# Patient Record
Sex: FEMALE | Race: WHITE | Employment: FULL TIME | URBAN - METROPOLITAN AREA
[De-identification: names, ages, dates, MRNs, and addresses within clinical notes are randomized per-mention and may not be internally consistent; named-entity substitution may affect disease eponyms.]

---

## 2017-02-08 ENCOUNTER — TRANSFERRED RECORDS (OUTPATIENT)
Dept: HEALTH INFORMATION MANAGEMENT | Facility: CLINIC | Age: 57
End: 2017-02-08

## 2017-04-04 ENCOUNTER — OFFICE VISIT (OUTPATIENT)
Dept: INTERNAL MEDICINE | Facility: CLINIC | Age: 57
End: 2017-04-04

## 2017-04-04 ENCOUNTER — TELEPHONE (OUTPATIENT)
Dept: GASTROENTEROLOGY | Facility: CLINIC | Age: 57
End: 2017-04-04

## 2017-04-04 VITALS
HEART RATE: 74 BPM | SYSTOLIC BLOOD PRESSURE: 101 MMHG | DIASTOLIC BLOOD PRESSURE: 68 MMHG | WEIGHT: 129.1 LBS | BODY MASS INDEX: 22.87 KG/M2

## 2017-04-04 DIAGNOSIS — E08.9 DIABETES MELLITUS DUE TO UNDERLYING CONDITION WITHOUT COMPLICATION, WITH LONG-TERM CURRENT USE OF INSULIN (H): ICD-10-CM

## 2017-04-04 DIAGNOSIS — Z80.0 FAMILY HISTORY OF COLON CANCER: ICD-10-CM

## 2017-04-04 DIAGNOSIS — Z79.4 DIABETES MELLITUS DUE TO UNDERLYING CONDITION WITHOUT COMPLICATION, WITH LONG-TERM CURRENT USE OF INSULIN (H): ICD-10-CM

## 2017-04-04 DIAGNOSIS — Z23 NEED FOR PROPHYLACTIC VACCINATION WITH TETANUS-DIPHTHERIA (TD): Primary | ICD-10-CM

## 2017-04-04 LAB
CREAT UR-MCNC: 86 MG/DL
HBA1C MFR BLD: 6.4 % (ref 4.3–6)
MICROALBUMIN UR-MCNC: 13 MG/L
MICROALBUMIN/CREAT UR: 15.15 MG/G CR (ref 0–25)

## 2017-04-04 ASSESSMENT — PAIN SCALES - GENERAL: PAINLEVEL: NO PAIN (0)

## 2017-04-04 NOTE — TELEPHONE ENCOUNTER
Message left for patient to call back to schedule colonoscopy as recommended by her PCP.    Arina Spears RN

## 2017-04-04 NOTE — NURSING NOTE
Chief Complaint   Patient presents with     Diabetes     pt here for diabetes check     Mary Anne Rosario CMA at 7:38 AM on 4/4/2017.

## 2017-04-04 NOTE — MR AVS SNAPSHOT
After Visit Summary   4/4/2017    Krish Renee    MRN: [id number]           Patient Information     Date Of Birth          1960        Visit Information        Provider Department      4/4/2017 7:40 AM Ariane Varela MD [facility]        Today's Diagnoses     Need for prophylactic vaccination with tetanus-diphtheria (TD)    -  1    Diabetes mellitus due to underlying condition without complication, with long-term current use of insulin (H)        Family history of colon cancer          Care Instructions    Primary Care Center Medication Refill Request Information:  * Please contact your pharmacy regarding ANY request for medication refills.  ** [facility] Prescription Fax = [phone]  * Please allow 3 business days for routine medication refills.  * Please allow 5 business days for controlled substance medication refills.     Primary Care Center Test Result notification information:  *You will be notified with in 7-10 days of your appointment day regarding the results of your test.  If you are on MyChart you will be notified as soon as the provider has reviewed the results and signed off on them.      Primary Care Center   [facility]  [address] [place] [address] (4th Floor )   [place], [place] [address] [phone]  [phone]  [facility] ([facility])  [address] [place] [address], [address]  [place] [address]  [phone].          Follow-ups after your visit        Additional Services     GI Procedure Referral       Last Lab Result: Creatinine (mg/dL)       Date                     Value                 09/13/2016               0.62             ----------  Body mass index is 22.87 kg/(m^2).     Needed:  No  Language:  English    Patient will be contacted to schedule procedure.     Please be aware that coverage of these services is subject to the terms and limitations of your health insurance plan.  Call member services at your health plan with any benefit or  coverage questions.  Any procedures must be performed at a [place] facility OR coordinated by your clinic's referral office.    Please bring the following with you to your appointment:    (1) Any X-Rays, CTs or MRIs which have been performed.  Contact the facility where they were done to arrange for  prior to your scheduled appointment.    (2) List of current medications   (3) This referral request   (4) Any documents/labs given to you for this referral                  Your next 10 appointments already scheduled     May 11, 2017  7:45 AM CDT   (Arrive by 7:30 AM)   MA SCREENING DIGITAL BILATERAL with [id number]   [facility] ([facility])    [address]  2nd Floor  [facility] [address] [phone]           Do not use any powder, lotion or deodorant under your arms or on your breast. If you do, we will ask you to remove it before your exam.  Wear comfortable, two-piece clothing.  If you have any allergies, tell your care team.  Bring any previous mammograms from other facilities or have them mailed to the breast center. This mammogram location, [facility], now offers 3D mammography. It doesn't replace a screening mammogram and can be done with a regular screening mammogram. It is optional and not all insurances will pay for it. 3D mammography is a special kind of mammogram that produces a three-dimensional image of the breast by using low dose-xrays. 3D allows the radiologist to see the breast tissue differently from 2D, which reduces the chance of repeat testing due to overlapping breast tissue. If you are interested in have a 3D mammogram, please check with your insurance before you arrive for your exam. 3D mammography is offered to all patients. On the day of your exam you will be asked to sign a form stating yes, you wish to have 3D imaging or, no, you decline.              Future tests that were ordered for you today      Open Future Orders        Priority Expected Expires Ordered    HEMOGLOBIN A1C -(Today) Routine 4/4/2017 4/4/2018 4/4/2017    Albumin Random Urine Quantitative - (Today) Routine 4/4/2017 4/4/2018 4/4/2017            Who to contact     Please call your clinic at [phone] to:    Ask questions about your health    Make or cancel appointments    Discuss your medicines    Learn about your test results    Speak to your doctor   If you have compliments or concerns about an experience at your clinic, or if you wish to file a complaint, please contact [facility] Patient Relations at [phone] or email us at [email or web address]         Additional Information About Your Visit        [employer] Information     [employer] gives you secure access to your electronic health record. If you see a primary care provider, you can also send messages to your care team and make appointments. If you have questions, please call your primary care clinic.  If you do not have a primary care provider, please call [phone] and they will assist you.      [employer] is an electronic gateway that provides easy, online access to your medical records. With [employer], you can request a clinic appointment, read your test results, renew a prescription or communicate with your care team.     To access your existing account, please contact your [facility] or call [phone] for assistance.        Care EveryWhere ID     This is your Care EveryWhere ID. This could be used by other organizations to access your [facility] medical records  [id number]        Your Vitals Were     Pulse Last Period BMI (Body Mass Index)             74 (Approximate) 22.87 kg/m2          Blood Pressure from Last 3 Encounters:   04/04/17 101/68   10/27/16 100/65   10/03/16 104/69    Weight from Last 3 Encounters:   04/04/17 58.6 kg (129 lb 1.6 oz)   10/27/16 58.1 kg (128 lb)   10/03/16 59 kg (130 lb)               We Performed the Following     GI Procedure Referral     TDAP ( [employer] AGES 10-64)        Primary Care Provider Office Phone # Fax #    Ariane Abdulaziz Varela [phone]        [facility] [address] [place] [address] [phone]  [facility] [address]        Thank you!     Thank you for choosing [facility]  for your care. Our goal is always to provide you with excellent care. Hearing back from our patients is one way we can continue to improve our services. Please take a few minutes to complete the written survey that you may receive in the mail after your visit with us. Thank you!             Your Updated Medication List - Protect others around you: Learn how to safely use, store and throw away your medicines at www.disposemymeds.org.          This list is accurate as of: 4/4/17  9:12 AM.  Always use your most recent med list.                   Brand Name Dispense Instructions for use    aspirin 81 MG EC tablet     90 tablet    Take 1 tablet (81 mg) by mouth daily       atorvastatin 40 MG tablet    LIPITOR    90 tablet    Take 1 tablet (40 mg) by mouth daily       blood glucose monitoring lancets     1 Box    Use to test blood sugars 4 times daily or as directed.       blood glucose monitoring test strip    FREESTYLE LITE    300 each    Use to test blood sugars 4 times daily or as directed.       FLUoxetine 20 MG capsule    PROzac    90 capsule    Take 60 mg by mouth daily       insulin glargine 100 UNIT/ML injection    LANTUS SOLOSTAR    9 mL    15 units at QAM or as directed       insulin pen needle 31G X 6 MM     100 each    Use 1 X daily or as directed.       metFORMIN 500 MG 24 hr tablet    GLUCOPHAGE-XR    90 tablet    500mg once a day for 7 days, the 1000mg once a day for 7 days, then 2000mg once a day

## 2017-04-04 NOTE — PROGRESS NOTES
SUBJECTIVE:    Ms. Renee is a 56 year old female with pmh of pancreatic neoplasm s/p distal pancreatectomy, HLD, and DM II. She presents today for DM follow up and medication check. Her blood sugars have been stable at home with fasting glucoses in the low-mid 80's. She did do a trial off her lantus last year which caused her fasting BS to be in the 110s. She has rare episodes of hypoglycemia when she is taking long walks (around the 2 hour maeve of the walk).  She denies any numbness or tingling in her hands or feet. She has an eye exam scheduled today. She would like to explore backing off of the metformin as she continues to have some GI symptoms - diarrhea about 1x/week or every other week. She states it is tolerable though. She also dropped her lantus from 15 units to 10 units and feels she has better control of her fasting blood sugars.     She states her depression is under control and has been having no issues with that.     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 Maternal Grandfather      DIABETES Father      Hypertension Father      CEREBROVASCULAR DISEASE Father      DIABETES Paternal Grandmother      Cancer - colorectal Maternal Grandmother      Allergies Sister      Depression Mother      Depression Father      Depression Sister      Depression Brother      Depression Brother      Depression Son      Alcohol/Drug Father       Alcohol/Drug Brother      CEREBROVASCULAR DISEASE Paternal Grandmother      Lipids Father      Circulatory Mother      blood clots     Arthritis Mother      CANCER Maternal Aunt      uterine     CANCER Brother      non hodgkins lymphoma, and hemolitic anemia     Social History   Substance Use Topics     Smoking status: Never Smoker     Smokeless tobacco: Never Used     Alcohol use No       Review Of Systems  Skin: negative  Eyes: negative  Ears/Nose/Throat: negative  Respiratory: No shortness of breath, dyspnea on exertion, cough, or hemoptysis  Cardiovascular: negative  Gastrointestinal: positive for diarrhea  Genitourinary: negative  Musculoskeletal: negative  Neurologic: negative  Psychiatric: positive for depression stable  Hematologic/Lymphatic/Immunologic: negative  Endocrine: positive for diabetes    OBJECTIVE:  /68 (BP Location: Right arm, Patient Position: Chair, Cuff Size: Adult Regular)  Pulse 74  Wt 58.6 kg (129 lb 1.6 oz)  LMP  (Approximate)  BMI 22.87 kg/m2  GENERAL APPEARANCE: Alert, no acute distress  HENT: EOMI, face symmetrical, no scleral icterus  RESP: lungs clear to auscultation   CV: normal rate, regular rhythm, no murmur or gallop  ABDOMEN: normoactive bowel sounds, non-tender, non-distended  MS: extremities normal, no peripheral edema  NEURO: Alert, oriented, speech and mentation normal  PSYCHE: mentation appears normal, affect and mood normal    ASSESSMENT/PLAN:    1- Diabetes: Very well controlled. Discussed why we use metformin with insulin and decreasing metformin could cause an increased need of insulin and potential weight gain with that. Her GI symptoms don't bother her enough to aggressively pursue other options, but discussed her ability to decrease Metformin to 1500mg/day if she feels like the GI symptoms are bothersome enough. Diarrhea could also be due to pancreatic insuffiencey - discussed pancreatic enzyme stool test, which patient is declining at this time.    - A1C    - Albumin Urine Quantitative     2- Health Maintenance: Due for TDAP and colonoscopy. Up to date on other immunizations/pap/mammo   - Colonoscopy referral to GI   - TDAP today      Scribe Disclosure:   I,  Elizabeth Chun, am serving as a scribe; to document services personally performed by Ariane Varela MD- -based on data collection and the provider's statements to me.     Provider Disclosure:  I agree with above History, Review of Systems, Physical exam and Plan.  I have reviewed the content of the documentation and have edited it as needed. I have personally performed the services documented here and the documentation accurately represents those services and the decisions I have made.      Electronically signed by:  Ariane Varela MD

## 2017-04-04 NOTE — PATIENT INSTRUCTIONS
Primary Care Center Medication Refill Request Information:  * Please contact your pharmacy regarding ANY request for medication refills.  ** [facility] Prescription Fax = [phone]  * Please allow 3 business days for routine medication refills.  * Please allow 5 business days for controlled substance medication refills.     Primary Care Center Test Result notification information:  *You will be notified with in 7-10 days of your appointment day regarding the results of your test.  If you are on MyChart you will be notified as soon as the provider has reviewed the results and signed off on them.      Primary Care Center   [facility]  [address] (4th Floor )   [place], [place] [address] [phone]  [phone]  [facility] ([facility])  [address] [place] [address], [address]  [place] [address] [phone].

## 2017-04-10 DIAGNOSIS — E11.9 DIABETES MELLITUS, TYPE 2 (H): ICD-10-CM

## 2017-04-10 RX ORDER — LANCETS 28 GAUGE
EACH MISCELLANEOUS
Qty: 4 BOX | Refills: 3 | Status: SHIPPED | OUTPATIENT
Start: 2017-04-10 | End: 2019-05-30

## 2017-05-11 ENCOUNTER — RADIANT APPOINTMENT (OUTPATIENT)
Dept: MAMMOGRAPHY | Facility: CLINIC | Age: 57
End: 2017-05-11

## 2017-05-11 DIAGNOSIS — Z12.31 VISIT FOR SCREENING MAMMOGRAM: ICD-10-CM

## 2017-06-22 DIAGNOSIS — E11.9 DIABETES MELLITUS (H): ICD-10-CM

## 2017-08-28 ENCOUNTER — MYC MEDICAL ADVICE (OUTPATIENT)
Dept: INTERNAL MEDICINE | Facility: CLINIC | Age: 57
End: 2017-08-28

## 2017-08-29 NOTE — TELEPHONE ENCOUNTER
Krish requesting her form for work be filled out and faxed. Nurse found form in Dr. Varela's box, signed. Nurse faxed to requested number. Placed in RN outbasket for scanning.

## 2017-09-07 ENCOUNTER — TELEPHONE (OUTPATIENT)
Dept: GASTROENTEROLOGY | Facility: OUTPATIENT CENTER | Age: 57
End: 2017-09-07

## 2017-09-07 DIAGNOSIS — Z80.0 FAMILY HX OF COLON CANCER REQUIRING SCREENING COLONOSCOPY: Primary | ICD-10-CM

## 2017-09-07 DIAGNOSIS — E11.9 DIABETES MELLITUS, TYPE 2 (H): Primary | ICD-10-CM

## 2017-09-07 RX ORDER — METFORMIN HCL 500 MG
TABLET, EXTENDED RELEASE 24 HR ORAL
Qty: 360 TABLET | Refills: 1 | Status: SHIPPED | OUTPATIENT
Start: 2017-09-07 | End: 2018-04-17

## 2017-09-07 NOTE — TELEPHONE ENCOUNTER
metformin  Last Written Prescription Date:  7/21/16  Last Fill Quantity: 90,   # refills: 3  Last Office Visit : 4/4/17  Future Office visit:  No future appt   A1C 6.4 (H) 04/04/2017     Lab Results   Component Value Date    MICROL 13 04/04/2017       Routing refill request to provider for review/approval because:  Directions updated to current dose

## 2017-09-07 NOTE — TELEPHONE ENCOUNTER
Patient taking any blood thinners ? Advised patient to stop aspirin 2 days prior to exam    Heart disease ? denies    Lung disease ? denies      Sleep apnea ? denies    Diabetic ? Type 2 Advised patient to consult her provider about insulin management    Kidney disease ? denies    Dialysis ? n/a    Electronic implanted medical devices ? denies    Are you taking any narcotic pain medication ? no  What is your daily dosage ?    PTSD ? n/a    Prep instructions reviewed with patient ? Patient declined review.  policy, conscious sedation plan reviewed. Advised patient to have someone stay with her post exam    Pharmacy : [facility]    Indication for procedure :  Associated Diagnoses      Family history of colon cancer [Z80.0            Referring provider :  Providers      Authorizing Provider Encounter Provider     Ariane Varela MD Melnik, Tanya Eugena, MD

## 2017-09-14 ENCOUNTER — TRANSFERRED RECORDS (OUTPATIENT)
Dept: HEALTH INFORMATION MANAGEMENT | Facility: CLINIC | Age: 57
End: 2017-09-14

## 2017-09-14 ENCOUNTER — DOCUMENTATION ONLY (OUTPATIENT)
Dept: GASTROENTEROLOGY | Facility: OUTPATIENT CENTER | Age: 57
End: 2017-09-14

## 2017-09-14 DIAGNOSIS — Z80.0 FAMILY HISTORY OF COLON CANCER: Primary | ICD-10-CM

## 2017-09-25 DIAGNOSIS — E11.9 TYPE 2 DIABETES MELLITUS WITHOUT COMPLICATION, UNSPECIFIED LONG TERM INSULIN USE STATUS: ICD-10-CM

## 2017-09-25 LAB — HBA1C MFR BLD: 6.1 % (ref 4.3–6)

## 2017-09-25 PROCEDURE — 83036 HEMOGLOBIN GLYCOSYLATED A1C: CPT | Performed by: INTERNAL MEDICINE

## 2017-09-25 PROCEDURE — 36415 COLL VENOUS BLD VENIPUNCTURE: CPT | Performed by: INTERNAL MEDICINE

## 2017-11-01 DIAGNOSIS — E11.9 DIABETES MELLITUS (H): ICD-10-CM

## 2017-11-01 RX ORDER — FLURBIPROFEN SODIUM 0.3 MG/ML
SOLUTION/ DROPS OPHTHALMIC
Refills: 1 | OUTPATIENT
Start: 2017-11-01

## 2017-12-05 ENCOUNTER — MYC MEDICAL ADVICE (OUTPATIENT)
Dept: INTERNAL MEDICINE | Facility: CLINIC | Age: 57
End: 2017-12-05

## 2017-12-05 DIAGNOSIS — Z80.0 FAMILY HISTORY OF COLON CANCER: Primary | ICD-10-CM

## 2017-12-13 DIAGNOSIS — E08.9 DIABETES MELLITUS DUE TO UNDERLYING CONDITION WITHOUT COMPLICATIONS (H): ICD-10-CM

## 2017-12-13 DIAGNOSIS — E11.9 TYPE 2 DIABETES MELLITUS WITH INSULIN DEFICIENCY (H): Primary | ICD-10-CM

## 2017-12-13 RX ORDER — INSULIN GLARGINE 100 [IU]/ML
INJECTION, SOLUTION SUBCUTANEOUS
Qty: 30 ML | Refills: 1 | Status: SHIPPED | OUTPATIENT
Start: 2017-12-13 | End: 2017-12-27

## 2017-12-13 NOTE — TELEPHONE ENCOUNTER
Last seen in clinic October 2016 with you and Dr Croft. Left msg on vm to please call to schedule an office visit

## 2017-12-27 ENCOUNTER — OFFICE VISIT (OUTPATIENT)
Dept: ENDOCRINOLOGY | Facility: CLINIC | Age: 57
End: 2017-12-27
Payer: COMMERCIAL

## 2017-12-27 VITALS
DIASTOLIC BLOOD PRESSURE: 74 MMHG | WEIGHT: 126.6 LBS | SYSTOLIC BLOOD PRESSURE: 120 MMHG | HEART RATE: 83 BPM | HEIGHT: 63 IN | BODY MASS INDEX: 22.43 KG/M2

## 2017-12-27 DIAGNOSIS — Z79.4 TYPE 2 DIABETES MELLITUS WITHOUT COMPLICATION, WITH LONG-TERM CURRENT USE OF INSULIN (H): Primary | ICD-10-CM

## 2017-12-27 DIAGNOSIS — E11.9 TYPE 2 DIABETES MELLITUS WITHOUT COMPLICATION, WITH LONG-TERM CURRENT USE OF INSULIN (H): Primary | ICD-10-CM

## 2017-12-27 DIAGNOSIS — E11.9 TYPE 2 DIABETES MELLITUS WITH INSULIN DEFICIENCY (H): ICD-10-CM

## 2017-12-27 LAB — HBA1C MFR BLD: 5.8 % (ref 4.3–6)

## 2017-12-27 ASSESSMENT — PAIN SCALES - GENERAL: PAINLEVEL: NO PAIN (0)

## 2017-12-27 ASSESSMENT — ENCOUNTER SYMPTOMS
DECREASED APPETITE: 0
WEIGHT GAIN: 0
CHILLS: 0
NIGHT SWEATS: 0
ALTERED TEMPERATURE REGULATION: 0
POLYDIPSIA: 0
HALLUCINATIONS: 0
FEVER: 0
FATIGUE: 1
WEIGHT LOSS: 0
POLYPHAGIA: 0
INCREASED ENERGY: 1

## 2017-12-27 NOTE — PROGRESS NOTES
Endocrinology Clinic Visit 12/27/2017    NAME:  Krish Renee  PCP:  Ariane Varela Abdulaziz  MRN:  [id number]  Reason for Consult:  Type 2 Diabetes  Requesting Provider:  Referred Self    Chief Complaint     Chief Complaint   Patient presents with     RECHECK     return diabetes        History of Present Illness     Krish Renee is a 57 year old female who is seen in clinic for diabetes management.     She is a previous patient of Dr. Angelito Croft, who has since graduated Endocrinology fellowship and left the [facility]. She is here to re-establish endocrine care.   She was diagnosed with diabetes in 2016 with a hemoglobin A1c of 10.1%.  She has a remote history of pancreatic surgery and initially it was thought her diabetes was related to pancreatic insufficiency, however when she first saw endocrinology C-peptide was checked which was not low, thus consistent with type 2 diabetes for which she has a strong family history.  She was started on metformin which she did not tolerate so she was switched to Metformin XR which has been well-tolerated.  She was also in a small dose of Lantus.  Last visit with endocrinology was October 2016.    Since last visit, she has reduced her Lantus to 10 units.   She tried without Lantus twice in the past year for few days at a time, and did not like how high her BG went (>100 fasting).   With Lantus, she is not experiencing any AM hypoglycemia. Sometimes in the day is she goes on long hikes her BG will drift down. But that is very rare.     A1c today was 5.8.     Associated Signs/Symptoms  Hyperglycemia: none.Neuropathy: none. Vascular Symtpoms: none. Angina/CHF: no chest pain. Ulcers: No. Amputations: No    Current treatment strategy: Lantus 10 units s/c QAM, Metformin XR 1000 mg po BID    Blood Glucose Monitoring: Meter reviewed as such:  Checks at variable times of the day, but mostly mornings. Usually  in AM rare in the 70s. Later in day more variable.   Hypoglycemia:  none    Diet:   Very regimented. Breakfast/lunch/dinner. No snacks. 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     Hyperlipidemia LDL goal <160     Ectopic breast tissue     Abdominal pain, right lower quadrant     Periocular dermatitis     Diabetes mellitus, type 2 (H)        Medications     Current Outpatient Prescriptions   Medication     insulin glargine (LANTUS SOLOSTAR) 100 UNIT/ML injection     insulin pen needle 31G X 6 MM     metFORMIN (GLUCOPHAGE-XR) 500 MG 24 hr tablet     blood glucose monitoring (FREESTYLE LITE) test strip     blood glucose monitoring (FREESTYLE) lancets     aspirin 81 MG EC tablet     atorvastatin (LIPITOR) 40 MG tablet     FLUoxetine (PROZAC) 20 MG capsule     [DISCONTINUED] LANTUS SOLOSTAR 100 UNIT/ML soln     No current facility-administered medications for this visit.         Allergies     Allergies   Allergen Reactions     No Known Drug Allergy        Medical / Surgical History     Past Medical History:   Diagnosis Date     depression      Leiomyoma of uterus, unspecified      NONSPECIFIC MEDICAL HISTORY Aug 2006    neurologic evaluation for CVA-like symptoms, no etiology determined     Past Surgical History:   Procedure Laterality Date     C NONSPECIFIC PROCEDURE  1994    C/S     C NONSPECIFIC PROCEDURE  Nov 2006    splenectomy/distal pancreatectomy at [facility] for benign tumor     C NONSPECIFIC PROCEDURE  1982    wisdom teeth removed     PANCREATECTOMY PARTIAL  2006       Social History     Social History     Social History     Marital status:      Spouse name: Eldon Renee     Number of children: 2     Years of education: 18     Occupational  History     [employer]     Social History Main Topics     Smoking status: Never Smoker     Smokeless tobacco: Never Used     Alcohol use No     Drug use: No     Sexual activity: Yes     Partners: Male     Birth control/ protection: Surgical      Comment: vastectomy     Other Topics Concern     Not on file     Social History Narrative       Family History     Family History   Problem Relation Age of Onset     C.A.D. Maternal Grandfather      DIABETES Father      Hypertension Father      CEREBROVASCULAR DISEASE Father      DIABETES Paternal Grandmother      Cancer - colorectal Maternal Grandmother      Allergies Sister      Depression Mother      Depression Father      Depression Sister      Depression Brother      Depression Brother      Depression Son      Alcohol/Drug Father      Alcohol/Drug Brother      CEREBROVASCULAR DISEASE Paternal Grandmother      Lipids Father      Circulatory Mother      blood clots     Arthritis Mother      CANCER Maternal Aunt      uterine     CANCER Brother      non hodgkins lymphoma, and hemolitic anemia       ROS     Constitutional: no fevers, chills, night sweats. No weight loss or fatigue. Good appetite  Eyes: no vision changes, no eye redness, no diplopia  Ears, Nose, mouth, throat: no hearing changes, no tinnitus, no rhinorrhea, no nasal congestion  Cardiovascular: no chest pain, no orthopnea or PND, no edema, no palpitations  Respiratory: no dyspnea, no cough, no sputum, no wheezing  Gastrointestinal: no nausea, no vomiting, no abdominal pain, no diarrhea, no constipation  Genitourinary: no dysuria, no frequency, no urgency, no nocturia  Musculoskeletal: no joint pains, no back pain, no cramps, no fractures  Skin: no rash, no itching, no dryness, no ulcers, no hair loss  Neurological: no headache, no weakness, no numbness, no dizziness, no tremors  Psychiatric: no anxiety, no sadness  Hematologic/lymphatic: no easy bruising, no bleeding, no palor    Physical Exam   BP  "120/74  Pulse 83  Ht 1.6 m (5' 3\")  Wt 57.4 kg (126 lb 9.6 oz)  LMP  (Approximate)  BMI 22.43 kg/m2     General: Comfortable, no obvious distress, normal body habitus  Eyes: Sclera anicteric, moist conjunctiva  HENT: Atraumatic, oropharynx clear, moist mucous membranes with no mucosal ulcerations  Neck: Trachea midline, supple. Thyroid: Thyroid is normal in size and texture  CV: Regular rhythm, normal rate. No murmurs auscultated  Resp: Clear to auscultation bilaterally, good effort  Abdomen:  Soft, non tender, non distended. Bowel sounds heard. No organomegaly.  Skin: No rashes, lesions, or subcutaneous nodules.   Psych: Alert and oriented x 3. Appropriate affect, good insight  Extremities: No peripheral edema  Foot exam:  Pulses:  Dorsalis pedis: present bilaterally  Posterior tibial: present bilaterally  Foot exam:  Vibration/Light touch: sensation present bilaterally  Skin of foot: intact bilaterally  Musculoskeletal: Appropriate muscle bulk and strength  Lymphatic: No cervical lymphadenopathy  Neuro: Moves all four extremities. No focal deficits on limited exam. Gait normal.       Labs/Imaging and Outside Records     Pertinent Labs were reviewed and updated in [email or web address].  Radiology Results were  reviewed and updated in EPIC.    Summary of recent findings:   Lab Results   Component Value Date    A1C 6.1 09/25/2017    A1C 6.4 04/04/2017    A1C 10.2 02/16/2016       TSH   Date Value Ref Range Status   09/13/2016 1.56 0.40 - 4.00 mU/L Final   06/23/2010 1.17 0.4 - 5.0 mU/L Final   02/19/2007 1.37 0.4 - 5.0 mU/L Final   05/05/2006 0.97 0.4 - 5.0 mU/L Final       Creatinine   Date Value Ref Range Status   09/13/2016 0.62 0.52 - 1.04 mg/dL Final       Recent Labs   Lab Test  09/13/16   0749  02/16/16   0946  04/11/12   0737   CHOL  101  263*  240*   HDL  43*  38*  42*   LDL  44  191*  165*   TRIG  68  169*  166*   CHOLHDLRATIO   --    --   5.7*     Impression / Plan     1. Diabetes Mellitus: Type 2  Current glycemic " control can be considered excellent.  It has improved since last visit.     I discussed dietary concepts today with the patient, including: general nutrition guidelines and consistent meals.     I also discussed exercise recommendations with the patient, advising for a goal of moderate physical activity 30 minutes 5 days a week. Specific examples were discussed such as brisk walking.    She is following the above recommendations very well.     As for glycemic control it is very good. But she is resisant to stopping Lantus. It might be that with her history of pancreatectomy, that she needs a little bit of insulin. We can try lower dose of metformin, since the only pill she takes in evening is metformin, and see what happens.     Plan:  - Reduce Metformin XR to 1000 mg po Qam. Stop PM dose  - Keep Lantus the same  - A1c in 3 months.     2. Diabetes Complications: With none.   - check urine microalbumin in 3 months    3. Blood Pressure Management: Blood pressure is controlled. Currently is not on pharmacotherapy for this.     4.Lipid Management: Per the new ACC/ROD/NHLBI guidelines, statins are recommended for individuals with diabetes aged 40-75 with LDL  without ASCVD, and for any individual with ASCVD. Currently the patient is on a statin.     5. Smoking Status: Patient Pt is smoke free..     Test and/or medications prescribed today:  Orders Placed This Encounter   Procedures     Hemoglobin A1c     Albumin Random Urine Quantitative with Creat Ratio       Follow up: 1 year. Lab in 3 months.       Anu Valdez MD  Endocrinology, Diabetes and Metabolism  [facility]

## 2017-12-27 NOTE — LETTER
12/27/2017       RE: Krish Renee  [address]  [place] [id number]     Dear Colleague,    Thank you for referring your patient, Krish Renee, to the [facility] ENDOCRINOLOGY at [facility]. Please see a copy of my visit note below.    Endocrinology Clinic Visit 12/27/2017    NAME:  Krish Renee  PCP:  Ariane Varela  MRN:  [id number]  Reason for Consult:  Type 2 Diabetes  Requesting Provider:  Referred Self    Chief Complaint     Chief Complaint   Patient presents with     RECHECK     return diabetes      History of Present Illness   Krish Renee is a 57 year old female who is seen in clinic for diabetes management.     She is a previous patient of Dr. Angelito Croft, who has since graduated Endocrinology fellowship and left the [facility]. She is here to re-establish endocrine care.   She was diagnosed with diabetes in 2016 with a hemoglobin A1c of 10.1%.  She has a remote history of pancreatic surgery and initially it was thought her diabetes was related to pancreatic insufficiency, however when she first saw endocrinology C-peptide was checked which was not low, thus consistent with type 2 diabetes for which she has a strong family history.  She was started on metformin which she did not tolerate so she was switched to Metformin XR which has been well-tolerated.  She was also in a small dose of Lantus.  Last visit with endocrinology was October 2016.    Since last visit, she has reduced her Lantus to 10 units.   She tried without Lantus twice in the past year for few days at a time, and did not like how high her BG went (>100 fasting).   With Lantus, she is not experiencing any AM hypoglycemia. Sometimes in the day is she goes on long hikes her BG will drift down. But that is very rare.     A1c today was 5.8.     Associated Signs/Symptoms  Hyperglycemia: none.Neuropathy: none. Vascular Symtpoms: none. Angina/CHF: no chest pain. Ulcers: No. Amputations:  No    Current treatment strategy: Lantus 10 units s/c QAM, Metformin XR 1000 mg po BID    Blood Glucose Monitoring: Meter reviewed as such:  Checks at variable times of the day, but mostly mornings. Usually  in AM rare in the 70s. Later in day more variable.   Hypoglycemia: none    Diet:   Very regimented. Breakfast/lunch/dinner. No snacks. 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     Hyperlipidemia LDL goal <160     Ectopic breast tissue     Abdominal pain, right lower quadrant     Periocular dermatitis     Diabetes mellitus, type 2 (H)      Medications     Current Outpatient Prescriptions   Medication     insulin glargine (LANTUS SOLOSTAR) 100 UNIT/ML injection     insulin pen needle 31G X 6 MM     metFORMIN (GLUCOPHAGE-XR) 500 MG 24 hr tablet     blood glucose monitoring (FREESTYLE LITE) test strip     blood glucose monitoring (FREESTYLE) lancets     aspirin 81 MG EC tablet     atorvastatin (LIPITOR) 40 MG tablet     FLUoxetine (PROZAC) 20 MG capsule     [DISCONTINUED] LANTUS SOLOSTAR 100 UNIT/ML soln     No current facility-administered medications for this visit.         Allergies     Allergies   Allergen Reactions     No Known Drug Allergy        Medical / Surgical History     Past Medical History:   Diagnosis Date     depression      Leiomyoma of uterus, unspecified      NONSPECIFIC MEDICAL HISTORY Aug 2006    neurologic evaluation for CVA-like symptoms, no etiology determined     Past Surgical History:   Procedure Laterality Date     C NONSPECIFIC PROCEDURE  1994    C/S     C NONSPECIFIC PROCEDURE  Nov 2006    splenectomy/distal pancreatectomy at [facility] for benign tumor      C NONSPECIFIC PROCEDURE  1982    wisdom teeth removed     PANCREATECTOMY PARTIAL  2006     Social History     Social History     Social History     Marital status:      Spouse name: Eldon Renee     Number of children: 2     Years of education: 18     Occupational History     [employer]     Social History Main Topics     Smoking status: Never Smoker     Smokeless tobacco: Never Used     Alcohol use No     Drug use: No     Sexual activity: Yes     Partners: Male     Birth control/ protection: Surgical      Comment: vastectomy     Other Topics Concern     Not on file     Social History Narrative     Family History     Family History   Problem Relation Age of Onset     C.A.D. Maternal Grandfather      DIABETES Father      Hypertension Father      CEREBROVASCULAR DISEASE Father      DIABETES Paternal Grandmother      Cancer - colorectal Maternal Grandmother      Allergies Sister      Depression Mother      Depression Father      Depression Sister      Depression Brother      Depression Brother      Depression Son      Alcohol/Drug Father      Alcohol/Drug Brother      CEREBROVASCULAR DISEASE Paternal Grandmother      Lipids Father      Circulatory Mother      blood clots     Arthritis Mother      CANCER Maternal Aunt      uterine     CANCER Brother      non hodgkins lymphoma, and hemolitic anemia       ROS   Constitutional: no fevers, chills, night sweats. No weight loss or fatigue. Good appetite  Eyes: no vision changes, no eye redness, no diplopia  Ears, Nose, mouth, throat: no hearing changes, no tinnitus, no rhinorrhea, no nasal congestion  Cardiovascular: no chest pain, no orthopnea or PND, no edema, no palpitations  Respiratory: no dyspnea, no cough, no sputum, no wheezing  Gastrointestinal: no nausea, no vomiting, no abdominal pain, no diarrhea, no constipation  Genitourinary: no dysuria, no frequency, no urgency, no nocturia  Musculoskeletal: no joint pains, no back pain, no cramps, no  "fractures  Skin: no rash, no itching, no dryness, no ulcers, no hair loss  Neurological: no headache, no weakness, no numbness, no dizziness, no tremors  Psychiatric: no anxiety, no sadness  Hematologic/lymphatic: no easy bruising, no bleeding, no palor    Physical Exam   /74  Pulse 83  Ht 1.6 m (5' 3\")  Wt 57.4 kg (126 lb 9.6 oz)  LMP  (Approximate)  BMI 22.43 kg/m2     General: Comfortable, no obvious distress, normal body habitus  Eyes: Sclera anicteric, moist conjunctiva  HENT: Atraumatic, oropharynx clear, moist mucous membranes with no mucosal ulcerations  Neck: Trachea midline, supple. Thyroid: Thyroid is normal in size and texture  CV: Regular rhythm, normal rate. No murmurs auscultated  Resp: Clear to auscultation bilaterally, good effort  Abdomen:  Soft, non tender, non distended. Bowel sounds heard. No organomegaly.  Skin: No rashes, lesions, or subcutaneous nodules.   Psych: Alert and oriented x 3. Appropriate affect, good insight  Extremities: No peripheral edema  Foot exam:  Pulses:  Dorsalis pedis: present bilaterally  Posterior tibial: present bilaterally  Foot exam:  Vibration/Light touch: sensation present bilaterally  Skin of foot: intact bilaterally  Musculoskeletal: Appropriate muscle bulk and strength  Lymphatic: No cervical lymphadenopathy  Neuro: Moves all four extremities. No focal deficits on limited exam. Gait normal.     Labs/Imaging and Outside Records   Pertinent Labs were reviewed and updated in [facility].  Radiology Results were  reviewed and updated in EPIC.    Summary of recent findings:   Lab Results   Component Value Date    A1C 6.1 09/25/2017    A1C 6.4 04/04/2017    A1C 10.2 02/16/2016       TSH   Date Value Ref Range Status   09/13/2016 1.56 0.40 - 4.00 mU/L Final   06/23/2010 1.17 0.4 - 5.0 mU/L Final   02/19/2007 1.37 0.4 - 5.0 mU/L Final   05/05/2006 0.97 0.4 - 5.0 mU/L Final       Creatinine   Date Value Ref Range Status   09/13/2016 0.62 0.52 - 1.04 mg/dL Final "       Recent Labs   Lab Test  09/13/16   0749  02/16/16   0946  04/11/12   0737   CHOL  101  263*  240*   HDL  43*  38*  42*   LDL  44  191*  165*   TRIG  68  169*  166*   CHOLHDLRATIO   --    --   5.7*     Impression / Plan   1. Diabetes Mellitus: Type 2  Current glycemic control can be considered excellent.  It has improved since last visit.     I discussed dietary concepts today with the patient, including: general nutrition guidelines and consistent meals.     I also discussed exercise recommendations with the patient, advising for a goal of moderate physical activity 30 minutes 5 days a week. Specific examples were discussed such as brisk walking.    She is following the above recommendations very well.     As for glycemic control it is very good. But she is resisant to stopping Lantus. It might be that with her history of pancreatectomy, that she needs a little bit of insulin. We can try lower dose of metformin, since the only pill she takes in evening is metformin, and see what happens.     Plan:  - Reduce Metformin XR to 1000 mg po Qam. Stop PM dose  - Keep Lantus the same  - A1c in 3 months.     2. Diabetes Complications: With none.   - check urine microalbumin in 3 months    3. Blood Pressure Management: Blood pressure is controlled. Currently is not on pharmacotherapy for this.     4.Lipid Management: Per the new ACC/ROD/NHLBI guidelines, statins are recommended for individuals with diabetes aged 40-75 with LDL  without ASCVD, and for any individual with ASCVD. Currently the patient is on a statin.     5. Smoking Status: Patient Pt is smoke free..     Test and/or medications prescribed today:  Orders Placed This Encounter   Procedures     Hemoglobin A1c     Albumin Random Urine Quantitative with Creat Ratio     Follow up: 1 year. Lab in 3 months.     Anu Valdez MD  Endocrinology, Diabetes and Metabolism  [facility]

## 2017-12-27 NOTE — MR AVS SNAPSHOT
After Visit Summary   12/27/2017    Krish Renee    MRN: [id number]           Patient Information     Date Of Birth          1960        Visit Information        Provider Department      12/27/2017 1:30 PM Anu Valdez MD [facility] Endocrinology        Today's Diagnoses     Type 2 diabetes mellitus without complication, with long-term current use of insulin (H)    -  1    Type 2 diabetes mellitus with insulin deficiency (H)           Follow-ups after your visit        Follow-up notes from your care team     Return in about 1 year (around 12/27/2018).      Your next 10 appointments already scheduled     Feb 02, 2018  2:15 PM CST   (Arrive by 2:00 PM)   NEW WITH ROOM with Aracelis Peck GC,  2 114 CONSULT RM   [facility] ([facility])    [address] [place] [id number] [phone]            Feb 02, 2018  3:30 PM CST   [facility] with [facility] LAB DRAW   [facility] ([facility])    [address] [place] [address] [phone]              Future tests that were ordered for you today     Open Future Orders        Priority Expected Expires Ordered    Albumin Random Urine Quantitative with Creat Ratio Routine 3/27/2018 12/27/2018 12/27/2017    Hemoglobin A1c Routine 3/27/2018 6/27/2018 12/27/2017            Who to contact     Please call your clinic at [phone] to:    Ask questions about your health    Make or cancel appointments    Discuss your medicines    Learn about your test results    Speak to your doctor   If you have compliments or concerns about an experience at your clinic, or if you wish to file a complaint, please contact [facility] Patient Relations at [phone] or email us at [email or web address]         Additional Information About Your Visit        MyChart Information     [employer] gives  "you secure access to your electronic health record. If you see a primary care provider, you can also send messages to your care team and make appointments. If you have questions, please call your primary care clinic.  If you do not have a primary care provider, please call [phone] and they will assist you.      [email or web address] is an electronic gateway that provides easy, online access to your medical records. With [email or web address], you can request a clinic appointment, read your test results, renew a prescription or communicate with your care team.     To access your existing account, please contact your [facility] or call [phone] for assistance.        Care EveryWhere ID     This is your Care EveryWhere ID. This could be used by other organizations to access your [facility] medical records  [id number]        Your Vitals Were     Pulse Height Last Period BMI (Body Mass Index)          83 1.6 m (5' 3\") (Approximate) 22.43 kg/m2         Blood Pressure from Last 3 Encounters:   12/27/17 120/74   04/04/17 101/68   10/27/16 100/65    Weight from Last 3 Encounters:   12/27/17 57.4 kg (126 lb 9.6 oz)   04/04/17 58.6 kg (129 lb 1.6 oz)   10/27/16 58.1 kg (128 lb)                 Today's Medication Changes          These changes are accurate as of: 12/27/17  2:05 PM.  If you have any questions, ask your nurse or doctor.               These medicines have changed or have updated prescriptions.        Dose/Directions    insulin glargine 100 UNIT/ML injection   Commonly known as:  LANTUS SOLOSTAR   This may have changed:  See the new instructions.   Used for:  Type 2 diabetes mellitus with insulin deficiency (H)        Dose:  10 Units   Inject 10 Units Subcutaneous every morning   Quantity:  15 mL   Refills:  3            Where to get your medicines      These medications were sent to [facility] [id number] [place], [place] - [address] [place], [place] [id number]     Phone:  " [phone]     insulin glargine 100 UNIT/ML injection                Primary Care Provider Office Phone # Fax #    Ariane Abdulaziz Varela [phone]       [facility] [address]  [facility] [address]        Equal Access to Services     DARNELLEstelle [facility] HospitalRHONA : Hadii aad ku hadasho Soomaali, waaxda luqadaha, qaybta kaalmada adeegyada, waxay vandanain elsin bellalatoya chungxin macario. So [facility] [phone].    ATENCIÓN: Si habla español, tiene a gardner disposición servicios gratuitos de asistencia lingüística. Jamel al [phone].    We comply with applicable federal civil rights laws and [place] laws. We do not discriminate on the basis of race, color, national origin, age, disability, sex, sexual orientation, or gender identity.            Thank you!     Thank you for choosing [facility] ENDOCRINOLOGY  for your care. Our goal is always to provide you with excellent care. Hearing back from our patients is one way we can continue to improve our services. Please take a few minutes to complete the written survey that you may receive in the mail after your visit with us. Thank you!             Your Updated Medication List - Protect others around you: Learn how to safely use, store and throw away your medicines at www.disposemymeds.org.          This list is accurate as of: 12/27/17  2:05 PM.  Always use your most recent med list.                   Brand Name Dispense Instructions for use Diagnosis    aspirin 81 MG EC tablet     90 tablet    Take 1 tablet (81 mg) by mouth daily    Type 2 diabetes mellitus without complication (H)       atorvastatin 40 MG tablet    LIPITOR    90 tablet    Take 1 tablet (40 mg) by mouth daily    Hypercholesteremia       blood glucose monitoring lancets     4 Box    Use to test blood sugars 4 times daily or as directed.    Diabetes mellitus, type 2 (H)       blood glucose monitoring test strip    FREESTYLE LITE    4 Box    Use to test blood sugars 4 times daily or as  directed.    Diabetes mellitus, type 2 (H)       FLUoxetine 20 MG capsule    PROzac    90 capsule    Take 60 mg by mouth daily    Irregular uterine bleeding, Pregnancy examination or test, pregnancy unconfirmed       insulin glargine 100 UNIT/ML injection    LANTUS SOLOSTAR    15 mL    Inject 10 Units Subcutaneous every morning    Type 2 diabetes mellitus with insulin deficiency (H)       insulin pen needle 31G X 6 MM     100 each    Use 1 X daily or as directed.    Diabetes mellitus (H)       metFORMIN 500 MG 24 hr tablet    GLUCOPHAGE-XR    360 tablet    2000mg once a day    Diabetes mellitus, type 2 (H)

## 2018-01-02 DIAGNOSIS — E11.9 TYPE 2 DIABETES MELLITUS WITH INSULIN DEFICIENCY (H): ICD-10-CM

## 2018-02-02 ENCOUNTER — OFFICE VISIT (OUTPATIENT)
Dept: ONCOLOGY | Facility: CLINIC | Age: 58
End: 2018-02-02
Attending: GENETIC COUNSELOR, MS
Payer: COMMERCIAL

## 2018-02-02 DIAGNOSIS — Z80.49 FAMILY HISTORY OF UTERINE CANCER: ICD-10-CM

## 2018-02-02 DIAGNOSIS — Z80.0 FAMILY HISTORY OF COLON CANCER: ICD-10-CM

## 2018-02-02 DIAGNOSIS — Z79.4 TYPE 2 DIABETES MELLITUS WITHOUT COMPLICATION, WITH LONG-TERM CURRENT USE OF INSULIN (H): ICD-10-CM

## 2018-02-02 DIAGNOSIS — D49.0 MUCINOUS NEOPLASM OF PANCREAS: ICD-10-CM

## 2018-02-02 DIAGNOSIS — E11.9 TYPE 2 DIABETES MELLITUS WITHOUT COMPLICATION, WITH LONG-TERM CURRENT USE OF INSULIN (H): ICD-10-CM

## 2018-02-02 DIAGNOSIS — D49.0 MUCINOUS NEOPLASM OF PANCREAS: Primary | ICD-10-CM

## 2018-02-02 LAB — MISCELLANEOUS TEST: NORMAL

## 2018-02-02 PROCEDURE — 96040 ZZH GENETIC COUNSELING, EACH 30 MINUTES: CPT | Mod: ZF | Performed by: GENETIC COUNSELOR, MS

## 2018-02-02 NOTE — LETTER
2/2/2018       RE: Krish Renee  [address]  [facility] [address]     Dear Colleague,    Thank you for referring your patient, Krish Renee, to the [facility]. Please see a copy of my visit note below.    2/2/2018    Referring Provider: Ariane Varela MD and Billy Snider MD    Presenting Information:   I met with Krish Renee today for genetic counseling at the Cancer Risk Management Program at the [facility] to discuss her personal history of a benign pancreatic tumor and family history of colon, uterine, and ovarian cancer. She is here today to review this history, cancer screening recommendations, and available genetic testing options.    Personal History:  Krish is a 57 year old female. 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      She had her first menstrual period at age 14, her first child at age 33, and went through menopause at age 51. Krish has her ovaries, fallopian tubes and uterus in place. She had one transvaginal ultrasound in June 2015 to address bleeding that identified uterine fibroids. She reports using oral contraceptive pills for two years and that she has never used hormone replacement therapy.      Her most recent OB-GYN exam and Pap smear in October 2016 were normal.  She has annual clinical breast exams and mammograms; her most recent mammogram in May 2017 was normal. She had one biopsy of her right breast in 2011 that identified a benign fibroadenoma. Her most recent colonoscopy in September 2017 was normal and follow-up was recommended in five years. She does not regularly do any other cancer screening at this time.  Krish reported no tobacco or alcohol use.    Family History: (Please see scanned pedigree for detailed family history information)    One of her brothers is 51 and was diagnosed with non-Hodgkin's lymphoma at age 13; treatment included chemotherapy  and radiation.    Krish's mother was recently diagnosed with colon cancer at age 85; treatment has included surgery. Krish also believes her mother has had approximately 50 colon polyps removed in her lifetime.     One maternal aunt is 70 and was diagnosed with uterine cancer at age 35; treatment included surgery.    One maternal aunt is 81 and had her leg amputated at age 60 due to a melanoma.    Krish's maternal grandmother was diagnosed with colon cancer at age 60 and passed away at age 68. Her mother was diagnosed with and passed away from colon cancer in her 50's.    One paternal first cousin is 53 and was diagnosed with ovarian cancer at age 42; treatment has included surgery and chemotherapy.    Her maternal ethnicity is English, Eritrean, and Wallisian. Her paternal ethnicity is Polish and Wallisian. There is no known Ashkenazi Episcopal ancestry on either side of her family. There is no reported consanguinity.    Discussion:    Krish's personal history of a pancreatic tumor and family history of colon polyps, colon cancer, uterine cancer, and ovarian cancer is suggestive of a hereditary cancer syndrome.    We reviewed the features of sporadic, familial, and hereditary cancers. In looking at Krish's family history, it is possible that a cancer susceptibility gene is present as she has several maternal relatives diagnosed with related cancers in three generations, as well as one paternal extended relative with an early-onset ovarian cancer. Her mother has also had multiple colon polyps.    We discussed the natural history and genetics of several hereditary cancer syndromes, including Fontaine syndrome. A detailed handout regarding these syndromes and the information we discussed was provided to Krish at the end of our appointment today and can be found in the after visit summary.  Topics included: inheritance pattern, cancer risks, cancer screening recommendations, and also risks, benefits and limitations of testing.    We  reviewed that one potential explanation for the family history is Fontaine syndrome, which is caused by mutations in the MLH1, MSH2, MSH6, PMS2, and EPCAM genes. Individuals with Fontaine syndrome are at increased risk for several different cancers, including colon, uterine, ovarian, and pancreatic cancer (of note, this is typically different from the tumor identified in Krish).    Based on her personal and family history, Krish meets current National Comprehensive Cancer Network (NCCN) criteria for genetic testing of the Fontaine syndrome genes.      We discussed that there are additional genes that could cause increased risk for the cancers in Krish's family. For example, the most common causes of hereditary ovarian cancer are mutations in the BRCA1 and BRCA2 genes. Also, mutations in the APC gene cause Familial Adenomatous Polyposis (FAP) and increased risk for multiple colon polyps and cancer. As many of these genes present with overlapping features in a family and accurate cancer risk cannot always be established based upon the pedigree analysis alone, it would be reasonable for Krish to consider panel genetic testing to analyze multiple genes at once.    We reviewed genetic testing options for hereditary colon and gynecologic cancers: colon/gynecologic custom panel (CustomNext-Cancer, 28 genes) and expanded high and moderate risk panel testing (CancerNext, 34 genes). Krish expressed interest in a smaller gene panel. She opted for the CustomNext-Cancer panel.     Genetic testing is available for 28 genes associated with hereditary colon and gynecologic cancers: CustomNext-Cancer (APC, OFELIA, BARD1, BRCA1, BRCA2, BRIP1, CDH1, CHEK2, DICER1, EPCAM, MLH1, MRE11A, MSH2, MSH6, MUTYH, NBN, NF1, PALB2, PMS2, POLD1, POLE, PTEN, RAD50, RAD51C, RAD51D, SMARCA4, STK11, and TP53).    We discussed that some of the genes in the CustomNext-Cancer panel are associated with specific hereditary cancer syndromes and published management  guidelines: Hereditary Breast and Ovarian Cancer syndrome (BRCA1, BRCA2), Fontaine syndrome (MLH1, MSH2, MSH6, PMS2, EPCAM), FAP (APC), Hereditary Diffuse Gastric Cancer (CDH1), Cowden syndrome (PTEN), Li Fraumeni syndrome (TP53), Peutz-Jeghers syndrome (STK11), MUTYH Associated Polyposis (MUTYH), and Neurofibromatosis type 1 (NF1).      Published screening and/or surgery guidelines are available to help manage risks associated with mutations in the BRIP1, RAD51C, RAD51D, OFELIA, CHEK2, PALB2, NF1, POLD1, POLE, and NBN genes.    The remaining genes (BARD1, DICER1, MRE11A, RAD50, and SMARCA4) are associated with increased cancer risk and may allow us to make medical recommendations when mutations are identified.      Krish was provided with a detailed brochure from [employer] explaining the CustomNext-Cancer testing.    Consent was obtained and genetic testing for this CustomNext-Cancer panel was sent to [facility]. Turn around time: approximately 4 weeks.    Medical Management: For Krish, we reviewed that the information from genetic testing may determine:    additional cancer screening for which Krish may qualify (i.e. annual mammogram and breast MRI, colonoscopies every 1-5 years, more frequent dermatologic exams, etc.),    options for risk reducing surgeries Krish could consider (i.e. bilateral mastectomy, surgery to remove her ovaries and/or uterus, etc.),      and targeted chemotherapies if she were to develop certain cancers in the future (i.e. immunotherapy for individuals with Fontaine syndrome, PARP inhibitors, etc.).     These recommendations and possible targeted chemotherapies will be discussed in detail once genetic testing is completed.     Plan:  1) Today Krish elected to proceed with genetic testing using the 28 gene CustomNext-Cancer panel offered by [facility].  2) This information should be available in 4 weeks.  3) Krish will return to clinic to discuss the results.    Face to face time:  45 minutes    Aracelis Peck MS, [facility]  Licensed Genetic Counselor  Office: [phone]  Pager: [phone]

## 2018-02-02 NOTE — MR AVS SNAPSHOT
After Visit Summary   2/2/2018    Krish Renee    MRN: [id number]           Patient Information     Date Of Birth          1960        Visit Information        Provider Department      2/2/2018 2:15 PM Aracelis Peck GC;  2 [address] CONSULT [facility]        Today's Diagnoses     Mucinous neoplasm of pancreas    -  1    Family history of colon cancer        Family history of uterine cancer          Care Instructions          Assessing Cancer Risk  Only about 5-10% of cancers are thought to be due to an inherited cancer susceptibility gene.    These families often have:    Several people with the same or related types of cancer    Cancers diagnosed at a young age (before age 50)    Individuals with more than one primary cancer    Multiple generations of the family affected with cancer    Some people may be candidates for genetic testing of more than one gene.  For these families, genetic testing using a cancer panel may be offered.  These panels can test many genes at once known to increase the risk for gynecologic (and other) cancers:  BRCA1, BRCA2, BRIP1 MLH1, MSH2, MSH6, PMS2, EPCAM, PTEN, PALB2, RAD51C, RAD51D, and TP53. The purpose of this handout is to serve as a brief summary of the gynecologic cancer risk genes that have published clinical management guidelines for individuals who are found to carry a mutation.    ______________________________________________________________________________  Hereditary Breast and Ovarian Cancer Syndrome   (BRCA1 and BRCA2)  A single mutation in one of the copies of BRCA1 or BRCA2 increases the risk for breast and ovarian cancer, among others.  The risk for pancreatic cancer and melanoma may also be slightly increased in some families.  The chart below shows the chance that someone with a BRCA mutation would develop cancer in his or her lifetime1,2,3,4.        A person s ethnic background is also important to consider, as  individuals of Ashkenazi Catholic ancestry have a higher chance of having a BRCA gene mutation.  There are three BRCA mutations that occur more frequently in this population.    Fontaine Syndrome   (MLH1, MSH2, MSH6, PMS2, and EPCAM)  Currently five genes are known to cause Fontaine Syndrome: MLH1, MSH2, MSH6, PMS2, and EPCAM.  A single mutation in one of the Fontaine Syndrome genes increases the risk for colon, endometrial, ovarian, and stomach cancers.  Other cancers that occur less commonly in Fontaine Syndrome include urinary tract, skin, and brain cancers.  The chart below shows the chance that a person with Fontaine syndrome would develop cancer in his or her lifetime7.      *Cancer risk varies depending on Fontaine syndrome gene found    Cowden Syndrome   (PTEN)  Cowden syndrome is a hereditary condition that increases the risk for breast, thyroid, endometrial, and kidney cancer.  Cowden syndrome is caused by a mutation in the PTEN gene.  A single mutation in one of the copies of PTEN causes Cowden syndrome and increases cancer risk.  The chart below shows the chance that someone with a PTEN mutation would develop cancer in their lifetime5,6.  Other benign features seen in some individuals with Cowden syndrome include benign skin lesions (facial papules, keratoses, lipomas), learning disability, autism, thyroid nodules, colon polyps, and larger head size.      *One recent study found breast cancer risk to be increased to 85%  Li-Fraumeni Syndrome   (TP53)  Li-Fraumeni Syndrome (LFS) is a cancer predisposition syndrome caused by a mutation in the TP53 gene. A single mutation in one of the copies of TP53 increases the risk for multiple cancers. Individuals with LFS are at an increased risk for developing cancer at a young age. The general lifetime risk for development of cancer is 50% by age 30 and 90% by age 60.     Core Cancers: Sarcomas, Breast, Brain, Lung, Leukemias/Lymphomas, Adrenocortical carcinomas  Other Cancers:  Gastrointestinal, Thyroid, Skin, Genitourinary    Additional Genes  PALB2  Mutations in PALB2 have been shown to increase the risk of breast cancer up to 33-58% in some families; where individuals fall within this risk range is dependent upon family history. PALB2 mutations have also been associated with increased risk for pancreatic cancer, although this risk has not been quantified yet.  Individuals who inherit two PALB2 mutations--one from their mother and one from their father--have a condition called Fanconi Anemia.  This rare autosomal recessive condition is associated with short stature, developmental delay, bone marrow failure, and increased risk for childhood cancers.    BRIP1, RAD51C and RAD51D  Mutations in BRIP1, RAD51C, and RAD51D have been shown to increase the risk of ovarian cancer as well as female breast cancer.    ______________________________________________________________________________    Inheritance  All of the cancer syndromes reviewed above are inherited in an autosomal dominant pattern.  This means that if a parent has a mutation, each of his or her children will have a 50% chance of inheriting that same mutation.  Therefore, each child--male or female--would have a 50% chance of being at increased risk for developing cancer.      Image obtained from [facility], 2013     Mutations in some genes can occur de sanjeev, which means that a person s mutation occurred for the first time in them and was not inherited from a parent.  Now that they have the mutation, however, it can be passed on to future generations.    Genetic Testing  Genetic testing involves a blood test and will look for any harmful mutations that are associated with increased cancer risk.  If possible, it is recommended that the person(s) who has had cancer be tested before other family members.  That person will give us the most useful information about whether or not a specific gene is associated with the cancer  in the family.    Results  There are three possible results of genetic testing:    Positive--a harmful mutation was identified in one or more of the genes    Negative--no mutation was identified in any of the 9 genes on this panel    Variant of unknown significance--a variation in one of the genes was identified, but it is unclear how this impacts cancer risk in the family    Advantages and Disadvantages   There are advantages and disadvantages to genetic testing.  Advantages    May clarify your cancer risk    Can help you make medical decisions    May explain the cancers in your family    May give useful information to your family members (if you share your results)    Disadvantages    Possible negative emotional impact of learning about inherited cancer risk    Uncertainty in interpreting a negative test result in some situations    Possible genetic discrimination concerns (see below)    Genetic Information Nondiscrimination Act (LEONELA)  LEONELA is a federal law that protects individuals from health insurance or employment discrimination based on a genetic test result alone.  Although rare, there are currently no legal discrimination protections in terms of life insurance, long term care, or disability insurances.  Visit the National Human [employer] [place] website to learn more.    Reducing Cancer Risk  Each of the genes listed within this handout have nationally recognized cancer screening guidelines that would be recommended for individuals who test positive.  In addition to increased cancer screening, surgeries may be offered or recommended to reduce cancer risk.  Recommendations are based upon an individual s genetic test result as well as their personal and family history of cancer.    Questions to Think About Regarding Genetic Testing:    What effect will the test result have on me and my relationship with my family members if I have an inherited gene mutation?  If I don t have a gene  mutation?    Should I share my test results, and how will my family react to this news, which may also affect them?    Are my children ready to learn new information that may one day affect their own health?        Hereditary Cancer Resources    FORCE: Facing Our Risk of Cancer Empowered facingourrisk.org   Bright Pink bebrightpink.org   Li-Fraumeni Syndrome Association lfsassociation.org   PTEN World PTENworld.com   Collaborative Group of the Americas on Inherited Colorectal Cancer (CGA) cgaicc.com http://www.facingourrisk.org/   Cancer Care cancercare.org   American Cancer Society (ACS) cancer.org   National Cancer [place] (NCI) cancer.gov       Cancer Risk Management Program [phone] ([phone])  ? Arina Arabella, MS, [facility]  [phone]  ? Atiya Alf, MS, [facility]  [phone]  ? Aracelis Peck, MS, [facility]  [phone]  ? Calista Tomas, MS, [facility]  [phone]   ? [place], MS, [facility]  [phone]    References  1. Micah A, Mini PDP, Narrenea S, Amaris ALEJANDRO, Cristino JE, Junior JL, Myrandaman N, Kasia H, Reilly O, Mariaa A, Lazarus B, Fanny P, Jon S, Jo Ann DM, Ross N, Yovani E, Killian H, Roberts E, Lubinski J, Gronwald J, Gifty B, Franca H, Thorlacius S, Eerola H, Aman H, Celina K, Matthew OP. Average risks of breast and ovarian cancer associated with BRCA1 or BRCA2 mutations detected in case series unselected for family history: a combined analysis of 222 studies. Am J Hum Mckenna. 2003;72:1117-30.  2. Trista CABRERA, Martha ERWIN, Aquiles G.  BRCA1 and BRCA2 Hereditary Breast and Ovarian Cancer. Gene Reviews online. 2013.  3. Eliseo YC, France S, Ernestina G, Florian S. Breast cancer risk among male BRCA1 and BRCA2 mutation carriers. J Natl Cancer Inst. 2007;99:1811-4.  4. Andres ORTIZ, Nohemy I, Lalo J, Alfredo E, Lesia ER, Wilfredo F. Risk of breast cancer in male BRCA2 carriers. J Med Mckenna. 2010;47:710-1.  5. Dillan FRAZIER, Collins J, Gustabo J, Karsten ERICKSON, Jamal GHOTRA, Juany C. Lifetime cancer risks in  individuals with germline PTEN mutations. Clin Cancer Res. 2012;18:400-7.  6. Aj MELGOZA. Cowden Syndrome: A Critical Review of the Clinical Literature. J Mckenna . 2009:18:13-27.  7. National Comprehensive Cancer Network. Clinical practice guidelines in oncology, colorectal cancer screening. Available online (registration required). 2015.  8. Micah CEVALLOS et al. Breast-Cancer Risk in Families with Mutations in PALB2. NEJM. 2014; 371(6):497-506.          Follow-ups after your visit        Follow-up notes from your care team     Return in about 4 weeks (around 3/2/2018).      Your next 10 appointments already scheduled     Feb 02, 2018  3:30 PM [facility]   [employer] Lab Draw with  [employer] LAB DRAW   [facility] Lab Draw ([facility])    [address] [place] [address]  [address] [place] [id number] [phone]              Future tests that were ordered for you today     Open Future Orders        Priority Expected Expires Ordered    Custom[facility]-Cancer genetic testing, [facility] Test: Laboratory Miscellaneous Order Routine  2/2/2019 2/2/2018            Who to contact     If you have questions or need follow up information about today's clinic visit or your schedule please contact [facility] directly at [phone].  Normal or non-critical lab and imaging results will be communicated to you by MyChart, letter or phone within 4 business days after the clinic has received the results. If you do not hear from us within 7 days, please contact the clinic through [employer] or phone. If you have a critical or abnormal lab result, we will notify you by phone as soon as possible.  Submit refill requests through [employer] or call your pharmacy and they will forward the refill request to us. Please allow 3 business days for your refill to be completed.          Additional Information About Your Visit        [employer] Information     [employer] gives you secure access to your electronic health  record. If you see a primary care provider, you can also send messages to your care team and make appointments. If you have questions, please call your primary care clinic.  If you do not have a primary care provider, please call [phone] and they will assist you.        Care EveryWhere ID     This is your Care EveryWhere ID. This could be used by other organizations to access your [place] medical records  [id number]        Your Vitals Were     Last Period                   07/17/2013            Blood Pressure from Last 3 Encounters:   12/27/17 120/74   04/04/17 101/68   10/27/16 100/65    Weight from Last 3 Encounters:   12/27/17 57.4 kg (126 lb 9.6 oz)   04/04/17 58.6 kg (129 lb 1.6 oz)   10/27/16 58.1 kg (128 lb)               Primary Care Provider Office Phone # Fax #    Ariane Abdulaziz Varela [phone]       [facility] [address] [place], [place] [address]E [facility] [address]        Equal Access to Services     [facility]: Hadii aad ku hadasho Soomaali, waaxda luqadaha, qaybta kaalmada adeegyada, laly medrano . So [facility] [phone].    ATENCIÓN: Si habla español, tiene a gardner disposición servicios gratuitos de asistencia lingüística. [facility] [phone].    We comply with applicable federal civil rights laws and [place] laws. We do not discriminate on the basis of race, color, national origin, age, disability, sex, sexual orientation, or gender identity.            Thank you!     Thank you for choosing [facility]  for your care. Our goal is always to provide you with excellent care. Hearing back from our patients is one way we can continue to improve our services. Please take a few minutes to complete the written survey that you may receive in the mail after your visit with us. Thank you!             Your Updated Medication List - Protect others around you: Learn how to safely use, store and throw away your medicines at  www.disposemymeds.org.          This list is accurate as of 2/2/18  3:10 PM.  Always use your most recent med list.                   Brand Name Dispense Instructions for use Diagnosis    aspirin 81 MG EC tablet     90 tablet    Take 1 tablet (81 mg) by mouth daily    Type 2 diabetes mellitus without complication (H)       atorvastatin 40 MG tablet    LIPITOR    90 tablet    Take 1 tablet (40 mg) by mouth daily    Hypercholesteremia       blood glucose monitoring lancets     4 Box    Use to test blood sugars 4 times daily or as directed.    Diabetes mellitus, type 2 (H)       blood glucose monitoring test strip    FREESTYLE LITE    4 Box    Use to test blood sugars 4 times daily or as directed.    Diabetes mellitus, type 2 (H)       FLUoxetine 20 MG capsule    PROzac    90 capsule    Take 60 mg by mouth daily    Irregular uterine bleeding, Pregnancy examination or test, pregnancy unconfirmed       insulin glargine 100 UNIT/ML injection    LANTUS SOLOSTAR    15 mL    Inject 10 Units Subcutaneous every morning    Type 2 diabetes mellitus with insulin deficiency (H)       insulin pen needle 31G X 6 MM     100 each    Use 1 X daily or as directed.    Diabetes mellitus (H)       metFORMIN 500 MG 24 hr tablet    GLUCOPHAGE-XR    360 tablet    2000mg once a day    Diabetes mellitus, type 2 (H)

## 2018-02-02 NOTE — LETTER
Cancer Risk Management  Program Locations    [facility]  [facility]  Mailing Address  Cancer Risk Management Program  [facility]  [address]  [place] [address]    New patient appointments  [phone]  February 5, 2018    Krish Renee  [address]  [place] [facility] [address]      Dear Krish,    It was a pleasure meeting with you at the [facility] on February 2, 2018. Here is a copy of the progress note from your recent genetic counseling visit to the Cancer Risk Management Program. If you have any additional questions, please feel free to call.    2/2/2018    Referring Provider: Ariane Varela MD and Billy Snider MD    Presenting Information:   I met with Krish Renee today for genetic counseling at the Cancer Risk Management Program at the [facility] to discuss her personal history of a benign pancreatic tumor and family history of colon, uterine, and ovarian cancer. She is here today to review this history, cancer screening recommendations, and available genetic testing options.    Personal History:  Krish is a 57 year old female. 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      She had her first menstrual period at age 14, her first child at age 33, and went through menopause at age 51. Krish has her ovaries, fallopian tubes and uterus in place. She had one transvaginal ultrasound in June 2015 to address bleeding that identified uterine fibroids. She reports using oral contraceptive pills for two years and that she has never used hormone replacement therapy.      Her most recent OB-GYN exam and Pap smear in October 2016 were normal.  She has annual clinical breast exams and mammograms; her most recent  mammogram in May 2017 was normal. She had one biopsy of her right breast in 2011 that identified a benign fibroadenoma. Her most recent colonoscopy in September 2017 was normal and follow-up was recommended in five years. She does not regularly do any other cancer screening at this time.  Krish reported no tobacco or alcohol use.    Family History: (Please see scanned pedigree for detailed family history information)    One of her brothers is 51 and was diagnosed with non-Hodgkin's lymphoma at age 13; treatment included chemotherapy and radiation.    Krish's mother was recently diagnosed with colon cancer at age 85; treatment has included surgery. Krish also believes her mother has had approximately 50 colon polyps removed in her lifetime.     One maternal aunt is 70 and was diagnosed with uterine cancer at age 35; treatment included surgery.    One maternal aunt is 81 and had her leg amputated at age 60 due to a melanoma.    Krish's maternal grandmother was diagnosed with colon cancer at age 60 and passed away at age 68. Her mother was diagnosed with and passed away from colon cancer in her 50's.    One paternal first cousin is 53 and was diagnosed with ovarian cancer at age 42; treatment has included surgery and chemotherapy.    Her maternal ethnicity is English, [place], and [place]. Her paternal ethnicity is Polish and [place]. There is no known Ashkenazi Quaker ancestry on either side of her family. There is no reported consanguinity.    Discussion:    Krish's personal history of a pancreatic tumor and family history of colon polyps, colon cancer, uterine cancer, and ovarian cancer is suggestive of a hereditary cancer syndrome.    We reviewed the features of sporadic, familial, and hereditary cancers. In looking at Krish's family history, it is possible that a cancer susceptibility gene is present as she has several maternal relatives diagnosed with related cancers in three generations, as well as one paternal extended  relative with an early-onset ovarian cancer. Her mother has also had multiple colon polyps.    We discussed the natural history and genetics of several hereditary cancer syndromes, including Fontaine syndrome. A detailed handout regarding these syndromes and the information we discussed was provided to Krish at the end of our appointment today and can be found in the after visit summary.  Topics included: inheritance pattern, cancer risks, cancer screening recommendations, and also risks, benefits and limitations of testing.    We reviewed that one potential explanation for the family history is Fontaine syndrome, which is caused by mutations in the MLH1, MSH2, MSH6, PMS2, and EPCAM genes. Individuals with Fontaine syndrome are at increased risk for several different cancers, including colon, uterine, ovarian, and pancreatic cancer (of note, this is typically different from the tumor identified in Krish).    Based on her personal and family history, Krish meets current National Comprehensive Cancer Network (NCCN) criteria for genetic testing of the Fontaine syndrome genes.      We discussed that there are additional genes that could cause increased risk for the cancers in Krish's family. For example, the most common causes of hereditary ovarian cancer are mutations in the BRCA1 and BRCA2 genes. Also, mutations in the APC gene cause Familial Adenomatous Polyposis (FAP) and increased risk for multiple colon polyps and cancer. As many of these genes present with overlapping features in a family and accurate cancer risk cannot always be established based upon the pedigree analysis alone, it would be reasonable for Krish to consider panel genetic testing to analyze multiple genes at once.    We reviewed genetic testing options for hereditary colon and gynecologic cancers: colon/gynecologic custom panel (CustomNext-Cancer, 28 genes) and expanded high and moderate risk panel testing (CancerNext, 34 genes). Krish expressed interest in a  smaller gene panel. She opted for the CustomNext-Cancer panel.     Genetic testing is available for 28 genes associated with hereditary colon and gynecologic cancers: CustomNext-Cancer (APC, OFELIA, BARD1, BRCA1, BRCA2, BRIP1, CDH1, CHEK2, DICER1, EPCAM, MLH1, MRE11A, MSH2, MSH6, MUTYH, NBN, NF1, PALB2, PMS2, POLD1, POLE, PTEN, RAD50, RAD51C, RAD51D, SMARCA4, STK11, and TP53).    We discussed that some of the genes in the CustomNext-Cancer panel are associated with specific hereditary cancer syndromes and published management guidelines: Hereditary Breast and Ovarian Cancer syndrome (BRCA1, BRCA2), Fontaine syndrome (MLH1, MSH2, MSH6, PMS2, EPCAM), FAP (APC), Hereditary Diffuse Gastric Cancer (CDH1), Cowden syndrome (PTEN), Li Fraumeni syndrome (TP53), Peutz-Jeghers syndrome (STK11), MUTYH Associated Polyposis (MUTYH), and Neurofibromatosis type 1 (NF1).      Published screening and/or surgery guidelines are available to help manage risks associated with mutations in the BRIP1, RAD51C, RAD51D, OFELIA, CHEK2, PALB2, NF1, POLD1, POLE, and NBN genes.    The remaining genes (BARD1, DICER1, MRE11A, RAD50, and SMARCA4) are associated with increased cancer risk and may allow us to make medical recommendations when mutations are identified.      Krish was provided with a detailed brochure from [facility] explaining the CustomNext-Cancer testing.    Consent was obtained and genetic testing for this CustomNext-Cancer panel was sent to [facility]. Turn around time: approximately 4 weeks.    Medical Management: For Krish, we reviewed that the information from genetic testing may determine:    additional cancer screening for which Krish may qualify (i.e. annual mammogram and breast MRI, colonoscopies every 1-5 years, more frequent dermatologic exams, etc.),    options for risk reducing surgeries Krish could consider (i.e. bilateral mastectomy, surgery to remove her ovaries and/or uterus, etc.),      and targeted  chemotherapies if she were to develop certain cancers in the future (i.e. immunotherapy for individuals with Fontaine syndrome, PARP inhibitors, etc.).     These recommendations and possible targeted chemotherapies will be discussed in detail once genetic testing is completed.     Plan:  1) Today Krish elected to proceed with genetic testing using the 28 gene CustomNext-Cancer panel offered by [facility] [employer] [facility] [employer].  2) This information should be available in 4 weeks.  3) Krish will return to clinic to discuss the results.    Face to face time: 45 minutes    Aracelis Peck MS, [facility]  Licensed Genetic Counselor  Office: [phone]  Pager: [phone]

## 2018-02-02 NOTE — PROGRESS NOTES
2/2/2018    Referring Provider: Ariane Varela MD and Billy Snider MD    Presenting Information:   I met with Krish Renee today for genetic counseling at the Cancer Risk Management Program at the [facility] to discuss her personal history of a benign pancreatic tumor and family history of colon, uterine, and ovarian cancer. She is here today to review this history, cancer screening recommendations, and available genetic testing options.    Personal History:  Krish is a 57 year old female. 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      She had her first menstrual period at age 14, her first child at age 33, and went through menopause at age 51. Krish has her ovaries, fallopian tubes and uterus in place. She had one transvaginal ultrasound in June 2015 to address bleeding that identified uterine fibroids. She reports using oral contraceptive pills for two years and that she has never used hormone replacement therapy.      Her most recent OB-GYN exam and Pap smear in October 2016 were normal.  She has annual clinical breast exams and mammograms; her most recent mammogram in May 2017 was normal. She had one biopsy of her right breast in 2011 that identified a benign fibroadenoma. Her most recent colonoscopy in September 2017 was normal and follow-up was recommended in five years. She does not regularly do any other cancer screening at this time.  Krish reported no tobacco or alcohol use.    Family History: (Please see scanned pedigree for detailed family history information)    One of her brothers is 51 and was diagnosed with non-Hodgkin's lymphoma at age 13; treatment included chemotherapy and radiation.    Krish's mother was recently diagnosed with colon cancer at age 85; treatment has included surgery. Krish also believes her mother has had approximately 50 colon polyps removed in her lifetime.     One maternal aunt is 70  and was diagnosed with uterine cancer at age 35; treatment included surgery.    One maternal aunt is 81 and had her leg amputated at age 60 due to a melanoma.    Krish's maternal grandmother was diagnosed with colon cancer at age 60 and passed away at age 68. Her mother was diagnosed with and passed away from colon cancer in her 50's.    One paternal first cousin is 53 and was diagnosed with ovarian cancer at age 42; treatment has included surgery and chemotherapy.    Her maternal ethnicity is English, Pashto, and Hebrew. Her paternal ethnicity is Polish and Hebrew. There is no known Ashkenazi Holiness ancestry on either side of her family. There is no reported consanguinity.    Discussion:    Krish's personal history of a pancreatic tumor and family history of colon polyps, colon cancer, uterine cancer, and ovarian cancer is suggestive of a hereditary cancer syndrome.    We reviewed the features of sporadic, familial, and hereditary cancers. In looking at Krish's family history, it is possible that a cancer susceptibility gene is present as she has several maternal relatives diagnosed with related cancers in three generations, as well as one paternal extended relative with an early-onset ovarian cancer. Her mother has also had multiple colon polyps.    We discussed the natural history and genetics of several hereditary cancer syndromes, including Fontaine syndrome. A detailed handout regarding these syndromes and the information we discussed was provided to Krish at the end of our appointment today and can be found in the after visit summary.  Topics included: inheritance pattern, cancer risks, cancer screening recommendations, and also risks, benefits and limitations of testing.    We reviewed that one potential explanation for the family history is Fontaine syndrome, which is caused by mutations in the MLH1, MSH2, MSH6, PMS2, and EPCAM genes. Individuals with Fontaine syndrome are at increased risk for several different cancers,  including colon, uterine, ovarian, and pancreatic cancer (of note, this is typically different from the tumor identified in Krish).    Based on her personal and family history, Krish meets current National Comprehensive Cancer Network (NCCN) criteria for genetic testing of the Fontaine syndrome genes.      We discussed that there are additional genes that could cause increased risk for the cancers in Krish's family. For example, the most common causes of hereditary ovarian cancer are mutations in the BRCA1 and BRCA2 genes. Also, mutations in the APC gene cause Familial Adenomatous Polyposis (FAP) and increased risk for multiple colon polyps and cancer. As many of these genes present with overlapping features in a family and accurate cancer risk cannot always be established based upon the pedigree analysis alone, it would be reasonable for Krish to consider panel genetic testing to analyze multiple genes at once.    We reviewed genetic testing options for hereditary colon and gynecologic cancers: colon/gynecologic custom panel (CustomNext-Cancer, 28 genes) and expanded high and moderate risk panel testing (CancerNext, 34 genes). Krish expressed interest in a smaller gene panel. She opted for the CustomNext-Cancer panel.     Genetic testing is available for 28 genes associated with hereditary colon and gynecologic cancers: CustomNext-Cancer (APC, OFELIA, BARD1, BRCA1, BRCA2, BRIP1, CDH1, CHEK2, DICER1, EPCAM, MLH1, MRE11A, MSH2, MSH6, MUTYH, NBN, NF1, PALB2, PMS2, POLD1, POLE, PTEN, RAD50, RAD51C, RAD51D, SMARCA4, STK11, and TP53).    We discussed that some of the genes in the CustomNext-Cancer panel are associated with specific hereditary cancer syndromes and published management guidelines: Hereditary Breast and Ovarian Cancer syndrome (BRCA1, BRCA2), Fontaine syndrome (MLH1, MSH2, MSH6, PMS2, EPCAM), FAP (APC), Hereditary Diffuse Gastric Cancer (CDH1), Cowden syndrome (PTEN), Li Fraumeni syndrome (TP53), Peutz-Jeghers syndrome  (STK11), MUTYH Associated Polyposis (MUTYH), and Neurofibromatosis type 1 (NF1).      Published screening and/or surgery guidelines are available to help manage risks associated with mutations in the BRIP1, RAD51C, RAD51D, OFELIA, CHEK2, PALB2, NF1, POLD1, POLE, and NBN genes.    The remaining genes (BARD1, DICER1, MRE11A, RAD50, and SMARCA4) are associated with increased cancer risk and may allow us to make medical recommendations when mutations are identified.      Krish was provided with a detailed brochure from [facility] explaining the CustomNext-Cancer testing.    Consent was obtained and genetic testing for this CustomNext-Cancer panel was sent to [facility]. Turn around time: approximately 4 weeks.    Medical Management: For Krish, we reviewed that the information from genetic testing may determine:    additional cancer screening for which Krish may qualify (i.e. annual mammogram and breast MRI, colonoscopies every 1-5 years, more frequent dermatologic exams, etc.),    options for risk reducing surgeries Krish could consider (i.e. bilateral mastectomy, surgery to remove her ovaries and/or uterus, etc.),      and targeted chemotherapies if she were to develop certain cancers in the future (i.e. immunotherapy for individuals with Fontaine syndrome, PARP inhibitors, etc.).     These recommendations and possible targeted chemotherapies will be discussed in detail once genetic testing is completed.     Plan:  1) Today Krish elected to proceed with genetic testing using the 28 gene CustomNext-Cancer panel offered by [facility].  2) This information should be available in 4 weeks.  3) Krish will return to clinic to discuss the results.    Face to face time: 45 minutes    Aracelis Peck MS, [facility]  Licensed Genetic Counselor  Office: [phone]  Pager: [phone]

## 2018-02-02 NOTE — PATIENT INSTRUCTIONS
Assessing Cancer Risk  Only about 5-10% of cancers are thought to be due to an inherited cancer susceptibility gene.    These families often have:    Several people with the same or related types of cancer    Cancers diagnosed at a young age (before age 50)    Individuals with more than one primary cancer    Multiple generations of the family affected with cancer    Some people may be candidates for genetic testing of more than one gene.  For these families, genetic testing using a cancer panel may be offered.  These panels can test many genes at once known to increase the risk for gynecologic (and other) cancers:  BRCA1, BRCA2, BRIP1 MLH1, MSH2, MSH6, PMS2, EPCAM, PTEN, PALB2, RAD51C, RAD51D, and TP53. The purpose of this handout is to serve as a brief summary of the gynecologic cancer risk genes that have published clinical management guidelines for individuals who are found to carry a mutation.    ______________________________________________________________________________  Hereditary Breast and Ovarian Cancer Syndrome   (BRCA1 and BRCA2)  A single mutation in one of the copies of BRCA1 or BRCA2 increases the risk for breast and ovarian cancer, among others.  The risk for pancreatic cancer and melanoma may also be slightly increased in some families.  The chart below shows the chance that someone with a BRCA mutation would develop cancer in his or her lifetime1,2,3,4.        A person s ethnic background is also important to consider, as individuals of Ashkenazi Roman Catholic ancestry have a higher chance of having a BRCA gene mutation.  There are three BRCA mutations that occur more frequently in this population.    Fontaine Syndrome   (MLH1, MSH2, MSH6, PMS2, and EPCAM)  Currently five genes are known to cause Fontaine Syndrome: MLH1, MSH2, MSH6, PMS2, and EPCAM.  A single mutation in one of the Fontaine Syndrome genes increases the risk for colon, endometrial, ovarian, and stomach cancers.  Other cancers that occur  less commonly in Fontaine Syndrome include urinary tract, skin, and brain cancers.  The chart below shows the chance that a person with Fontaine syndrome would develop cancer in his or her lifetime7.      *Cancer risk varies depending on Fontaine syndrome gene found    Cowden Syndrome   (PTEN)  Cowden syndrome is a hereditary condition that increases the risk for breast, thyroid, endometrial, and kidney cancer.  Cowden syndrome is caused by a mutation in the PTEN gene.  A single mutation in one of the copies of PTEN causes Cowden syndrome and increases cancer risk.  The chart below shows the chance that someone with a PTEN mutation would develop cancer in their lifetime5,6.  Other benign features seen in some individuals with Cowden syndrome include benign skin lesions (facial papules, keratoses, lipomas), learning disability, autism, thyroid nodules, colon polyps, and larger head size.      *One recent study found breast cancer risk to be increased to 85%  Li-Fraumeni Syndrome   (TP53)  Li-Fraumeni Syndrome (LFS) is a cancer predisposition syndrome caused by a mutation in the TP53 gene. A single mutation in one of the copies of TP53 increases the risk for multiple cancers. Individuals with LFS are at an increased risk for developing cancer at a young age. The general lifetime risk for development of cancer is 50% by age 30 and 90% by age 60.     Core Cancers: Sarcomas, Breast, Brain, Lung, Leukemias/Lymphomas, Adrenocortical carcinomas  Other Cancers: Gastrointestinal, Thyroid, Skin, Genitourinary    Additional Genes  PALB2  Mutations in PALB2 have been shown to increase the risk of breast cancer up to 33-58% in some families; where individuals fall within this risk range is dependent upon family history. PALB2 mutations have also been associated with increased risk for pancreatic cancer, although this risk has not been quantified yet.  Individuals who inherit two PALB2 mutations--one from their mother and one from their  father--have a condition called Fanconi Anemia.  This rare autosomal recessive condition is associated with short stature, developmental delay, bone marrow failure, and increased risk for childhood cancers.    BRIP1, RAD51C and RAD51D  Mutations in BRIP1, RAD51C, and RAD51D have been shown to increase the risk of ovarian cancer as well as female breast cancer.    ______________________________________________________________________________    Inheritance  All of the cancer syndromes reviewed above are inherited in an autosomal dominant pattern.  This means that if a parent has a mutation, each of his or her children will have a 50% chance of inheriting that same mutation.  Therefore, each child--male or female--would have a 50% chance of being at increased risk for developing cancer.      Image obtained from Genetics Home Reference, 2013     Mutations in some genes can occur de sanjeev, which means that a person s mutation occurred for the first time in them and was not inherited from a parent.  Now that they have the mutation, however, it can be passed on to future generations.    Genetic Testing  Genetic testing involves a blood test and will look for any harmful mutations that are associated with increased cancer risk.  If possible, it is recommended that the person(s) who has had cancer be tested before other family members.  That person will give us the most useful information about whether or not a specific gene is associated with the cancer in the family.    Results  There are three possible results of genetic testing:    Positive--a harmful mutation was identified in one or more of the genes    Negative--no mutation was identified in any of the 9 genes on this panel    Variant of unknown significance--a variation in one of the genes was identified, but it is unclear how this impacts cancer risk in the family    Advantages and Disadvantages   There are advantages and disadvantages to genetic  testing.  Advantages    May clarify your cancer risk    Can help you make medical decisions    May explain the cancers in your family    May give useful information to your family members (if you share your results)    Disadvantages    Possible negative emotional impact of learning about inherited cancer risk    Uncertainty in interpreting a negative test result in some situations    Possible genetic discrimination concerns (see below)    Genetic Information Nondiscrimination Act (LEONELA)  LEONELA is a federal law that protects individuals from health insurance or employment discrimination based on a genetic test result alone.  Although rare, there are currently no legal discrimination protections in terms of life insurance, long term care, or disability insurances.  Visit the [employer] [place] website to learn more.    Reducing Cancer Risk  Each of the genes listed within this handout have nationally recognized cancer screening guidelines that would be recommended for individuals who test positive.  In addition to increased cancer screening, surgeries may be offered or recommended to reduce cancer risk.  Recommendations are based upon an individual s genetic test result as well as their personal and family history of cancer.    Questions to Think About Regarding Genetic Testing:    What effect will the test result have on me and my relationship with my family members if I have an inherited gene mutation?  If I don t have a gene mutation?    Should I share my test results, and how will my family react to this news, which may also affect them?    Are my children ready to learn new information that may one day affect their own health?        Hereditary Cancer Resources    FORCE: Facing Our Risk of Cancer Empowered facingourrisk.org   Bright Pink bebrightpink.org   Li-Fraumeni Syndrome Association lfsassociation.org   PTEN World PTENworld.com   Collaborative Group of the Americas on Inherited  Colorectal Cancer (CGA) [facility]gaM[facility]ows [facility]iatric Center.com http://[email or web address] [email or web address].[email or web address]/   Cancer Care cancercare.org   American Cancer Society (ACS) cancer.org   National Cancer [place] (NCI) cancer.gov       Cancer Risk Management Program [phone]-CANCER ([phone])  ? Arina Bell, MS, [facility] [place]  [phone]  ? Atiya Milner, MS, [facility] [place]  [phone]  ? Aracelis Peck, MS, [facility] [place]  [phone]  ? Calista Tomas, MS, [facility] [place]  [phone]   ? Los David, MS, [facility] [place]  [phone]    References  1. Micah A, Mini PDP, Dayo S, Amaris ALEJANDRO, Cristino JE, Junior JL, Amy N, Kasia H, Reilly O, Mariaa A, Lazarus B, Fanny P, Manshyla S, Jo Ann DM, Ross N, Yovani E, Killian H, Armando E, Dara J, Raymon J, Gifty B, Tuldyllanus H, Thorlacius S, Eerola H, Nevpranavlinna H, Celina K, Matthew OP. Average risks of breast and ovarian cancer associated with BRCA1 or BRCA2 mutations detected in case series unselected for family history: a combined analysis of 222 studies. Am J Hum Mckenna. 2003;72:1117-30.  2. Trista N, Martha M, Kwan G.  BRCA1 and BRCA2 Hereditary Breast and Ovarian Cancer. Gene Reviews online. 2013.  3. Eliseo YC, France S, Ernestina G, Florian S. Breast cancer risk among male BRCA1 and BRCA2 mutation carriers. J Natl Cancer Inst. 2007;99:1811-4.  4. Andres DG, Nohemy I, Lalo J, Alfredo E, Lesia ER, Wilfredo F. Risk of breast cancer in male BRCA2 carriers. J Med Mckenna. 2010;47:710-1.  5. Dillan MH, Collins J, Gustabo J, Karsten LA, Jamal MS, Eng C. Lifetime cancer risks in individuals with germline PTEN mutations. Clin Cancer Res. 2012;18:400-7.  6. Aj MELGOZA. Cowden Syndrome: A Critical Review of the Clinical Literature. J Mckenna . 2009:18:13-27.  7. National Comprehensive Cancer Network. Clinical practice guidelines in oncology, colorectal cancer screening. Available online (registration required). 2015.  8. Micah CEVALLOS et al. Breast-Cancer Risk in Families with Mutations in PALB2. NEJM. 2014; 371(6):497-506.

## 2018-03-08 LAB — LAB SCANNED RESULT: NORMAL

## 2018-03-23 ENCOUNTER — OFFICE VISIT (OUTPATIENT)
Dept: ONCOLOGY | Facility: CLINIC | Age: 58
End: 2018-03-23
Attending: GENETIC COUNSELOR, MS
Payer: COMMERCIAL

## 2018-03-23 DIAGNOSIS — Z80.0 FAMILY HISTORY OF COLON CANCER: ICD-10-CM

## 2018-03-23 DIAGNOSIS — Z80.49 FAMILY HISTORY OF UTERINE CANCER: ICD-10-CM

## 2018-03-23 DIAGNOSIS — D49.0 MUCINOUS NEOPLASM OF PANCREAS: Primary | ICD-10-CM

## 2018-03-23 PROCEDURE — 40000072 ZZH STATISTIC GENETIC COUNSELING, < 16 MIN: Mod: ZF | Performed by: GENETIC COUNSELOR, MS

## 2018-03-23 NOTE — MR AVS SNAPSHOT
After Visit Summary   3/23/2018    Krish Renee    MRN: [id number]           Patient Information     Date Of Birth          1960        Visit Information        Provider Department      3/23/2018 3:30 PM Aracelis Peck GC;  2 116 CONSULT [facility]        Care Instructions            Negative Genetic Test Result    Genetic Testing  You had a blood test that looked at the genetic information in one or more genes associated with increased cancer risk.  The testing looked for any harmful changes that would stop this particular gene from working like it should. If an individual does not have any harmful changes or variants of unknown significance found from their blood test, their genetic test result is reported as negative.       Results  The genetic test did not identify any pathogenic (harmful) changes in the genes that were tested. There are several possible explanations for a negative test result. Without knowing the gene mutation in your family, the cause of the cancer in you or your relatives is still unknown. Your genetic counselor can help interpret the result for you and your relatives. In this case, there are several reasons that may explain the negative test result:    There may be a gene mutation in the family that you did not inherit.     You may have a gene mutation in a different gene that was not included in the test, or has not yet been discovered.     The cancers in you or your family may be due to a combination of genetic factors and environment (multifactorial/familial).    The cancers in you or your family may be sporadic/random cancers.    There is very small chance that a mutation was not found by current testing methods.  As testing technology evolves over time, it may still be possible to identify a mutation in a gene that was not found on this test.    It is important to note which genes were included in your test. A list of these genes  can be found on your test result.    Screening Recommendations  Due to this negative test result, cancer screening recommendations should be based on your personal and family history. This may include increased cancer screening for you and/or your family members. Your genetic counselor and health care provider can help make appropriate recommendations.      Please call us if you have any questions or concerns.     Cancer Risk Management Program  [phone] ([phone])  ? Arina Arabella, [place], [facility]  [phone]  ? Atiya Alf, MS, [facility]  [phone]  ? Aracelis Peck, MS, [facility]  [phone]  ? Calista Tomas, MS, [facility]  [phone]  ? Los David, MS, [facility]  [phone]          Follow-ups after your visit        Your next 10 appointments already scheduled     Mar 23, 2018  3:30 PM CDT   (Arrive by 3:15 PM)   RETURN WITH ROOM with Aracelis Peck GC,  2 116 CONSULT [facility])    [address] [place] [address]  [address] [place] [id number] [phone]              Who to contact     If you have questions or need follow up information about today's clinic visit or your schedule please contact [facility] directly at [phone].  Normal or non-critical lab and imaging results will be communicated to you by Zayantehart, letter or phone within 4 business days after the clinic has received the results. If you do not hear from us within 7 days, please contact the clinic through Zayantehart or phone. If you have a critical or abnormal lab result, we will notify you by phone as soon as possible.  Submit refill requests through [employer] or call your pharmacy and they will forward the refill request to us. Please allow 3 business days for your refill to be completed.          Additional Information About Your Visit        [employer] Information     [employer] gives you secure access to your electronic health record. If you see a primary care provider,  you can also send messages to your care team and make appointments. If you have questions, please call your primary care clinic.  If you do not have a primary care provider, please call [phone] and they will assist you.        Care EveryWhere ID     This is your Care EveryWhere ID. This could be used by other organizations to access your [facility] medical records  [id number]        Your Vitals Were     Last Period                   07/17/2013            Blood Pressure from Last 3 Encounters:   12/27/17 120/74   04/04/17 101/68   10/27/16 100/65    Weight from Last 3 Encounters:   12/27/17 57.4 kg (126 lb 9.6 oz)   04/04/17 58.6 kg (129 lb 1.6 oz)   10/27/16 58.1 kg (128 lb)              Today, you had the following     No orders found for display       Primary Care Provider Office Phone # Fax #    Ariane Abdulaziz Varela [phone]       [facility] [address] [facility] [address]        Equal Access to Services     DARYL [facility] UnitRHONA : Hadii aad ku hadasho Soomaali, waaxda luqadaha, qaybta kaalmada adeeglida, laly medrano . So [facility] [phone].    ATENCIÓN: Si habla español, tiene a gardner disposición servicios gratuitos de asistencia lingüística. [facility] [phone].    We comply with applicable federal civil rights laws and [place] laws. We do not discriminate on the basis of race, color, national origin, age, disability, sex, sexual orientation, or gender identity.            Thank you!     Thank you for choosing [facility]  for your care. Our goal is always to provide you with excellent care. Hearing back from our patients is one way we can continue to improve our services. Please take a few minutes to complete the written survey that you may receive in the mail after your visit with us. Thank you!             Your Updated Medication List - Protect others around you: Learn how to safely use, store and throw away your medicines at  www.disposemymeds.org.          This list is accurate as of 3/23/18  3:27 PM.  Always use your most recent med list.                   Brand Name Dispense Instructions for use Diagnosis    aspirin 81 MG EC tablet     90 tablet    Take 1 tablet (81 mg) by mouth daily    Type 2 diabetes mellitus without complication (H)       atorvastatin 40 MG tablet    LIPITOR    90 tablet    Take 1 tablet (40 mg) by mouth daily    Hypercholesteremia       blood glucose monitoring lancets     4 Box    Use to test blood sugars 4 times daily or as directed.    Diabetes mellitus, type 2 (H)       blood glucose monitoring test strip    FREESTYLE LITE    4 Box    Use to test blood sugars 4 times daily or as directed.    Diabetes mellitus, type 2 (H)       FLUoxetine 20 MG capsule    PROzac    90 capsule    Take 60 mg by mouth daily    Irregular uterine bleeding, Pregnancy examination or test, pregnancy unconfirmed       insulin glargine 100 UNIT/ML injection    LANTUS SOLOSTAR    15 mL    Inject 10 Units Subcutaneous every morning    Type 2 diabetes mellitus with insulin deficiency (H)       insulin pen needle 31G X 6 MM     100 each    Use 1 X daily or as directed.    Diabetes mellitus (H)       metFORMIN 500 MG 24 hr tablet    GLUCOPHAGE-XR    360 tablet    2000mg once a day    Diabetes mellitus, type 2 (H)

## 2018-03-23 NOTE — PATIENT INSTRUCTIONS
Negative Genetic Test Result    Genetic Testing  You had a blood test that looked at the genetic information in one or more genes associated with increased cancer risk.  The testing looked for any harmful changes that would stop this particular gene from working like it should. If an individual does not have any harmful changes or variants of unknown significance found from their blood test, their genetic test result is reported as negative.       Results  The genetic test did not identify any pathogenic (harmful) changes in the genes that were tested. There are several possible explanations for a negative test result. Without knowing the gene mutation in your family, the cause of the cancer in you or your relatives is still unknown. Your genetic counselor can help interpret the result for you and your relatives. In this case, there are several reasons that may explain the negative test result:    There may be a gene mutation in the family that you did not inherit.     You may have a gene mutation in a different gene that was not included in the test, or has not yet been discovered.     The cancers in you or your family may be due to a combination of genetic factors and environment (multifactorial/familial).    The cancers in you or your family may be sporadic/random cancers.    There is very small chance that a mutation was not found by current testing methods.  As testing technology evolves over time, it may still be possible to identify a mutation in a gene that was not found on this test.    It is important to note which genes were included in your test. A list of these genes can be found on your test result.    Screening Recommendations  Due to this negative test result, cancer screening recommendations should be based on your personal and family history. This may include increased cancer screening for you and/or your family members. Your genetic counselor and health care provider can help make  appropriate recommendations.      Please call us if you have any questions or concerns.     Cancer Risk Management Program  [phone]-CANCER ([phone])  ? Arina Bell, MS, [facility]  [phone]  ? Atiya Milner, MS, [facility]  [phone]  ? Aracelis Peck, MS, [facility]  [phone]  ? Calista Tomas, MS, [facility]  [phone]  ? Los David, MS, [facility]  [phone]

## 2018-03-23 NOTE — LETTER
Cancer Risk Management  Program Locations    [facility]  [facility]  Mailing Address  Cancer Risk Management Program  [facility]  [address]  [place] [address]    New patient appointments  [phone]  March 29, 2018    Krish Renee  [address]  [place] [facility] [address]      Dear Krish,    It was a pleasure meeting with you again at the [facility] on March 23, 2018. Here is a copy of the progress note from your recent genetic counseling visit to the Cancer Risk Management Program. If you have any additional questions, please feel free to call.    3/23/2018    Referring Provider: Ariane Varela MD and Billy Snider MD    Presenting Information:  Krish Renee returned to the Cancer Risk Management Program at the [facility] to discuss her genetic testing results. Her blood was drawn on 2/2/2018. Fontaine Syndrome Analyses with the CustomNext-Cancer panel was ordered from [employer]. This testing was done because of her personal history of a pancreatic mucinous cystic neoplasm and family history of colon, ovarian, and uterine cancer.    Genetic Testing Result: NEGATIVE  Krish is negative for mutations in the APC, OFELIA, BARD1, BRCA1, BRCA2, BRIP1, CDH1, CHEK2, DICER1, EPCAM, MLH1, MRE11A, MSH2, MSH6, MUTYH, NBN, NF1, PALB2, PMS2, POLD1, POLE, PTEN, RAD50, RAD51C, RAD51D, SMARCA4, STK11, and TP53  genes. No mutations were found in any of the 28 genes analyzed. This test involved sequencing and deletion/duplication analysis of all genes with the exception of EPCAM (deletions/duplications only).    Testing did not detect an identifiable mutation associated with Hereditary Breast and Ovarian Cancer syndrome (BRCA1, BRCA2), Fontaine syndrome (MLH1, MSH2, MSH6, PMS2, EPCAM), Familial Adenomatous  "Polyposis (APC), Hereditary Diffuse Gastric Cancer (CDH1), Cowden syndrome (PTEN), Li Fraumeni syndrome (TP53), Peutz-Jeghers syndrome (STK11), MUTYH Associated Polyposis (MUTYH), or Neurofibromatosis type 1 (NF1).     A copy of the test report can be found in the Laboratory tab, dated 2/2/2018, and named \"[facility]". The report is scanned in as a linked document.     Interpretation:  We discussed several different interpretations of this negative test result.    1. One explanation may be that there is a different unidentifiable gene or combination of genes and environment that are associated with the cancers/tumors in Krish and her relatives. If that is the case, additional genetic testing may be available in the future that can identify a genetic cause for Krish's personal and family history. As such, she is encouraged to contact me annually to review any new genetic testing options that may be appropriate for her.   2. It is possible that another relative, such as her mother and/or paternal first cousin with ovarian cancer, does have a mutation in one of these 28 genes and Krish did not inherit it. If that is the case, Krish's mucinous cystic neoplasm may be a sporadic tumor caused by random cellular changes.  3. There is also a small possibility that there is a mutation in one of these genes and we could not find it with our current testing methods.       Screening:  Based on this negative test result, it is important for Krish and her relatives to refer back to the family history for appropriate cancer screening.      Krish should continue to follow all pancreatic tumor screening recommendations as made by her medical providers.    Per current National Comprehensive Cancer Network (NCCN) guidelines, individuals with a family history of  adenomatous colon polyps in a first degree relative should consider colonoscopy and polypectomy every 3-5 years, starting at the same age as the youngest diagnosis of " polyposis in the family (if uncomplicated by cancer) or by age 40, whichever comes first. Given her mother's report history of approximately 50 colon polyps, this recommendation would apply to Krish and her siblings.    Other population cancer screening options, such as those recommended by the American Cancer Society and NCCN, are also appropriate for Krish and her family. These screening recommendations may change if there are changes to Krish's personal and/or family history. Final screening recommendations should be made by each individual's managing physician.    Inheritance:  We reviewed the autosomal dominant inheritance of mutations in these 28 genes. We discussed that Krish did not pass on an identifiable mutation in these genes to her children based on this test result. Mutations in these genes do not skip generations.      Additional Testing Considerations:  Although Krish's genetic testing result was negative, other relatives may still carry a gene mutation associated with hereditary colon and/or gynecologic cancer. Genetic counseling is recommended for Krish's mother, maternal aunt with uterine cancer, and paternal first cousin with ovarian cancer to discuss their genetic testing options. If these relatives do pursue genetic testing, Krish is encouraged to contact me to discuss the impact of their test results on Krish.    Summary:  We do not have an explanation for Krish's personal history of a pancreatic mucinous cystic neoplasm and/or family history of cancer. Because of that, it is important that she continue with cancer screening based on her personal and family history as discussed above.    Genetic testing is rapidly advancing, and new cancer susceptibility genes will most likely be identified in the future. Therefore, I encouraged Krish to contact me annually or if there are changes in her personal or family history. This may change how we assess her cancer risk, screening, and the testing we would  offer.    Plan:  1.  I provided Krish with a copy of her test results today, and a handout summarizing negative genetic test results (see after visit summary).  2. She plans to follow-up with her medical providers, including Dr. Varela and Dr. Snider.  3. She should contact me annually, or sooner if her family history changes.    If Krish has any further questions, I encouraged her to contact me at [phone].    Time spent face to face: 15 minutes    Aracelis Peck MS, [facility]  Licensed Genetic Counselor  Office: [phone]  Pager: [phone]

## 2018-03-23 NOTE — LETTER
"3/23/2018       RE: Krish Renee  [address]  [facility] [address]     Dear Colleague,    Thank you for referring your patient, Krish Renee, to the [facility]. Please see a copy of my visit note below.    3/23/2018    Referring Provider: Ariane Varela MD and Billy Snider MD    Presenting Information:  Krish Renee returned to the Cancer Risk Management Program at the [facility] to discuss her genetic testing results. Her blood was drawn on 2/2/2018. Fontaine Syndrome Analyses with the CustomNext-Cancer panel was ordered from [employer]. This testing was done because of her personal history of a pancreatic mucinous cystic neoplasm and family history of colon, ovarian, and uterine cancer.    Genetic Testing Result: PEDRO Rutherford is negative for mutations in the APC, OFELIA, BARD1, BRCA1, BRCA2, BRIP1, CDH1, CHEK2, DICER1, EPCAM, MLH1, MRE11A, MSH2, MSH6, MUTYH, NBN, NF1, PALB2, PMS2, POLD1, POLE, PTEN, RAD50, RAD51C, RAD51D, SMARCA4, STK11, and TP53  genes. No mutations were found in any of the 28 genes analyzed. This test involved sequencing and deletion/duplication analysis of all genes with the exception of EPCAM (deletions/duplications only).    Testing did not detect an identifiable mutation associated with Hereditary Breast and Ovarian Cancer syndrome (BRCA1, BRCA2), Fontaine syndrome (MLH1, MSH2, MSH6, PMS2, EPCAM), Familial Adenomatous Polyposis (APC), Hereditary Diffuse Gastric Cancer (CDH1), Cowden syndrome (PTEN), Li Fraumeni syndrome (TP53), Peutz-Jeghers syndrome (STK11), MUTYH Associated Polyposis (MUTYH), or Neurofibromatosis type 1 (NF1).     A copy of the test report can be found in the Laboratory tab, dated 2/2/2018, and named \"[facility]\". The report is scanned in as a linked document.     Interpretation:  We discussed several different interpretations of this negative test result.    1. One explanation may be that there is a different unidentifiable " gene or combination of genes and environment that are associated with the cancers/tumors in Krish and her relatives. If that is the case, additional genetic testing may be available in the future that can identify a genetic cause for Krish's personal and family history. As such, she is encouraged to contact me annually to review any new genetic testing options that may be appropriate for her.   2. It is possible that another relative, such as her mother and/or paternal first cousin with ovarian cancer, does have a mutation in one of these 28 genes and Krish did not inherit it. If that is the case, Krish's mucinous cystic neoplasm may be a sporadic tumor caused by random cellular changes.  3. There is also a small possibility that there is a mutation in one of these genes and we could not find it with our current testing methods.       Screening:  Based on this negative test result, it is important for Krish and her relatives to refer back to the family history for appropriate cancer screening.      Krish should continue to follow all pancreatic tumor screening recommendations as made by her medical providers.    Per current National Comprehensive Cancer Network (NCCN) guidelines, individuals with a family history of  adenomatous colon polyps in a first degree relative should consider colonoscopy and polypectomy every 3-5 years, starting at the same age as the youngest diagnosis of polyposis in the family (if uncomplicated by cancer) or by age 40, whichever comes first. Given her mother's report history of approximately 50 colon polyps, this recommendation would apply to Krish and her siblings.    Other population cancer screening options, such as those recommended by the American Cancer Society and NCCN, are also appropriate for Krish and her family. These screening recommendations may change if there are changes to Krish's personal and/or family history. Final screening recommendations should be made by each  individual's managing physician.    Inheritance:  We reviewed the autosomal dominant inheritance of mutations in these 28 genes. We discussed that Krish did not pass on an identifiable mutation in these genes to her children based on this test result. Mutations in these genes do not skip generations.      Additional Testing Considerations:  Although Krish's genetic testing result was negative, other relatives may still carry a gene mutation associated with hereditary colon and/or gynecologic cancer. Genetic counseling is recommended for Krish's mother, maternal aunt with uterine cancer, and paternal first cousin with ovarian cancer to discuss their genetic testing options. If these relatives do pursue genetic testing, Krish is encouraged to contact me to discuss the impact of their test results on Krish.    Summary:  We do not have an explanation for Krish's personal history of a pancreatic mucinous cystic neoplasm and/or family history of cancer. Because of that, it is important that she continue with cancer screening based on her personal and family history as discussed above.    Genetic testing is rapidly advancing, and new cancer susceptibility genes will most likely be identified in the future. Therefore, I encouraged Krish to contact me annually or if there are changes in her personal or family history. This may change how we assess her cancer risk, screening, and the testing we would offer.    Plan:  1.  I provided Krish with a copy of her test results today, and a handout summarizing negative genetic test results (see after visit summary).  2. She plans to follow-up with her medical providers, including Dr. Varela and Dr. Snider.  3. She should contact me annually, or sooner if her family history changes.    If Krish has any further questions, I encouraged her to contact me at [phone].    Time spent face to face: 15 minutes    Aracelis Peck MS, [facility]  Licensed Genetic Counselor  Office: [phone]  Pager:  [phone]

## 2018-03-23 NOTE — PROGRESS NOTES
"3/23/2018    Referring Provider: Ariane Varela MD and Billy Snider MD    Presenting Information:  Krish Renee returned to the Cancer Risk Management Program at the [facility] to discuss her genetic testing results. Her blood was drawn on 2/2/2018. Fontaine Syndrome Analyses with the CustomNext-Cancer panel was ordered from [employer]. This testing was done because of her personal history of a pancreatic mucinous cystic neoplasm and family history of colon, ovarian, and uterine cancer.    Genetic Testing Result: NEGATIVE  Krish is negative for mutations in the APC, OFELIA, BARD1, BRCA1, BRCA2, BRIP1, CDH1, CHEK2, DICER1, EPCAM, MLH1, MRE11A, MSH2, MSH6, MUTYH, NBN, NF1, PALB2, PMS2, POLD1, POLE, PTEN, RAD50, RAD51C, RAD51D, SMARCA4, STK11, and TP53  genes. No mutations were found in any of the 28 genes analyzed. This test involved sequencing and deletion/duplication analysis of all genes with the exception of EPCAM (deletions/duplications only).    Testing did not detect an identifiable mutation associated with Hereditary Breast and Ovarian Cancer syndrome (BRCA1, BRCA2), Fontaine syndrome (MLH1, MSH2, MSH6, PMS2, EPCAM), Familial Adenomatous Polyposis (APC), Hereditary Diffuse Gastric Cancer (CDH1), Cowden syndrome (PTEN), Li Fraumeni syndrome (TP53), Peutz-Jeghers syndrome (STK11), MUTYH Associated Polyposis (MUTYH), or Neurofibromatosis type 1 (NF1).     A copy of the test report can be found in the Laboratory tab, dated 2/2/2018, and named \"SEND OUT[facility] TEST\". The report is scanned in as a linked document.     Interpretation:  We discussed several different interpretations of this negative test result.    1. One explanation may be that there is a different unidentifiable gene or combination of genes and environment that are associated with the cancers/tumors in Krish and her relatives. If that is the case, additional genetic testing may be available in the future that can identify a genetic cause for " Krish's personal and family history. As such, she is encouraged to contact me annually to review any new genetic testing options that may be appropriate for her.   2. It is possible that another relative, such as her mother and/or paternal first cousin with ovarian cancer, does have a mutation in one of these 28 genes and Krish did not inherit it. If that is the case, Krish's mucinous cystic neoplasm may be a sporadic tumor caused by random cellular changes.  3. There is also a small possibility that there is a mutation in one of these genes and we could not find it with our current testing methods.       Screening:  Based on this negative test result, it is important for Krish and her relatives to refer back to the family history for appropriate cancer screening.      Krish should continue to follow all pancreatic tumor screening recommendations as made by her medical providers.    Per current National Comprehensive Cancer Network (NCCN) guidelines, individuals with a family history of  adenomatous colon polyps in a first degree relative should consider colonoscopy and polypectomy every 3-5 years, starting at the same age as the youngest diagnosis of polyposis in the family (if uncomplicated by cancer) or by age 40, whichever comes first. Given her mother's report history of approximately 50 colon polyps, this recommendation would apply to Krish and her siblings.    Other population cancer screening options, such as those recommended by the American Cancer Society and NCCN, are also appropriate for Krish and her family. These screening recommendations may change if there are changes to Krish's personal and/or family history. Final screening recommendations should be made by each individual's managing physician.    Inheritance:  We reviewed the autosomal dominant inheritance of mutations in these 28 genes. We discussed that Krish did not pass on an identifiable mutation in these genes to her children based on this test  result. Mutations in these genes do not skip generations.      Additional Testing Considerations:  Although Krish's genetic testing result was negative, other relatives may still carry a gene mutation associated with hereditary colon and/or gynecologic cancer. Genetic counseling is recommended for Krish's mother, maternal aunt with uterine cancer, and paternal first cousin with ovarian cancer to discuss their genetic testing options. If these relatives do pursue genetic testing, Krish is encouraged to contact me to discuss the impact of their test results on Krish.    Summary:  We do not have an explanation for Krish's personal history of a pancreatic mucinous cystic neoplasm and/or family history of cancer. Because of that, it is important that she continue with cancer screening based on her personal and family history as discussed above.    Genetic testing is rapidly advancing, and new cancer susceptibility genes will most likely be identified in the future. Therefore, I encouraged Krish to contact me annually or if there are changes in her personal or family history. This may change how we assess her cancer risk, screening, and the testing we would offer.    Plan:  1.  I provided Krish with a copy of her test results today, and a handout summarizing negative genetic test results (see after visit summary).  2. She plans to follow-up with her medical providers, including Dr. Varela and Dr. Snider.  3. She should contact me annually, or sooner if her family history changes.    If Krish has any further questions, I encouraged her to contact me at [phone].    Time spent face to face: 15 minutes    Aracelis Peck MS, [facility]  Licensed Genetic Counselor  Office: [phone]  Pager: [phone]

## 2018-04-06 ENCOUNTER — MYC MEDICAL ADVICE (OUTPATIENT)
Dept: OTHER | Age: 58
End: 2018-04-06

## 2018-04-17 DIAGNOSIS — E11.9 DIABETES MELLITUS, TYPE 2 (H): ICD-10-CM

## 2018-04-18 ENCOUNTER — TELEPHONE (OUTPATIENT)
Dept: INTERNAL MEDICINE | Facility: CLINIC | Age: 58
End: 2018-04-18

## 2018-04-18 RX ORDER — METFORMIN HCL 500 MG
2000 TABLET, EXTENDED RELEASE 24 HR ORAL DAILY
Qty: 120 TABLET | Refills: 0 | Status: SHIPPED | OUTPATIENT
Start: 2018-04-18 | End: 2018-05-15

## 2018-04-18 NOTE — TELEPHONE ENCOUNTER
Last Clinic Visit: 4/4/17   NV: NONE  OVER DUE MD APPT.  30 DAY RF  Scheduling has been notified to contact the pt for MD APT. / NEW PROVIDER

## 2018-04-18 NOTE — TELEPHONE ENCOUNTER
----- Message from Caitlyn Red RN sent at 4/18/2018 12:27 PM CDT -----  Regarding: PCP APPT.  Please call to schedule WITH NEW PROVIDER   Patient is overdue for annual clinic visit - unless otherwise indicated patient needs to be scheduled with 1st available.  Thanks   MRT   I do not need any follow up on the scheduling of this appointment unless the patient will no longer be receiving care in our clinic.

## 2018-05-01 DIAGNOSIS — E78.00 HYPERCHOLESTEREMIA: ICD-10-CM

## 2018-05-01 NOTE — TELEPHONE ENCOUNTER
lipitor  Last Written Prescription Date:  5/11/16  Last Fill Quantity: 90,   # refills: 2  Last Office Visit : 4/4/17  Future Office visit:  5/15/18    Routing refill request to nurse for review/approval because:  Former pt of Dr. Varela  Failed lab protocol  Pt does have pending appt

## 2018-05-02 RX ORDER — ATORVASTATIN CALCIUM 40 MG/1
40 TABLET, FILM COATED ORAL DAILY
Qty: 30 TABLET | Refills: 0 | Status: SHIPPED | OUTPATIENT
Start: 2018-05-02 | End: 2018-05-15

## 2018-05-15 ENCOUNTER — OFFICE VISIT (OUTPATIENT)
Dept: INTERNAL MEDICINE | Facility: CLINIC | Age: 58
End: 2018-05-15
Payer: COMMERCIAL

## 2018-05-15 VITALS
OXYGEN SATURATION: 96 % | HEIGHT: 64 IN | HEART RATE: 77 BPM | TEMPERATURE: 97.7 F | WEIGHT: 129.9 LBS | BODY MASS INDEX: 22.18 KG/M2 | DIASTOLIC BLOOD PRESSURE: 70 MMHG | SYSTOLIC BLOOD PRESSURE: 102 MMHG

## 2018-05-15 DIAGNOSIS — R06.81 APNEA: ICD-10-CM

## 2018-05-15 DIAGNOSIS — E11.8 TYPE 2 DIABETES MELLITUS WITH COMPLICATION, UNSPECIFIED LONG TERM INSULIN USE STATUS: ICD-10-CM

## 2018-05-15 DIAGNOSIS — E11.9 TYPE 2 DIABETES MELLITUS WITH INSULIN DEFICIENCY (H): Primary | ICD-10-CM

## 2018-05-15 DIAGNOSIS — E11.9 TYPE 2 DIABETES MELLITUS WITH INSULIN DEFICIENCY (H): ICD-10-CM

## 2018-05-15 DIAGNOSIS — E78.00 HYPERCHOLESTEREMIA: ICD-10-CM

## 2018-05-15 DIAGNOSIS — F32.A DEPRESSION, UNSPECIFIED DEPRESSION TYPE: ICD-10-CM

## 2018-05-15 LAB
CHOLEST SERPL-MCNC: 165 MG/DL
CREAT SERPL-MCNC: 0.6 MG/DL (ref 0.52–1.04)
CREAT UR-MCNC: 55 MG/DL
GFR SERPL CREATININE-BSD FRML MDRD: >90 ML/MIN/1.7M2
HBA1C MFR BLD: 6.6 % (ref 0–5.6)
HDLC SERPL-MCNC: 43 MG/DL
LDLC SERPL CALC-MCNC: 93 MG/DL
MICROALBUMIN UR-MCNC: 8 MG/L
MICROALBUMIN/CREAT UR: 14.99 MG/G CR (ref 0–25)
NONHDLC SERPL-MCNC: 122 MG/DL
TRIGL SERPL-MCNC: 146 MG/DL

## 2018-05-15 RX ORDER — ATORVASTATIN CALCIUM 40 MG/1
40 TABLET, FILM COATED ORAL DAILY
Qty: 30 TABLET | Refills: 3 | Status: SHIPPED | OUTPATIENT
Start: 2018-05-15 | End: 2018-08-20

## 2018-05-15 RX ORDER — METFORMIN HCL 500 MG
2000 TABLET, EXTENDED RELEASE 24 HR ORAL DAILY
Qty: 120 TABLET | Refills: 0 | Status: SHIPPED | OUTPATIENT
Start: 2018-05-15 | End: 2018-08-16

## 2018-05-15 RX ORDER — METFORMIN HCL 500 MG
2000 TABLET, EXTENDED RELEASE 24 HR ORAL DAILY
Qty: 120 TABLET | Refills: 0 | Status: SHIPPED | OUTPATIENT
Start: 2018-05-15 | End: 2018-05-15

## 2018-05-15 RX ORDER — ATORVASTATIN CALCIUM 40 MG/1
40 TABLET, FILM COATED ORAL DAILY
Qty: 30 TABLET | Refills: 0 | Status: SHIPPED | OUTPATIENT
Start: 2018-05-15 | End: 2018-05-15

## 2018-05-15 ASSESSMENT — PAIN SCALES - GENERAL: PAINLEVEL: NO PAIN (0)

## 2018-05-15 ASSESSMENT — ENCOUNTER SYMPTOMS
INCREASED ENERGY: 0
FATIGUE: 0
WEIGHT LOSS: 0
NIGHT SWEATS: 0
CHILLS: 0
ALTERED TEMPERATURE REGULATION: 1
POLYPHAGIA: 0
POLYDIPSIA: 0
FEVER: 0
DECREASED APPETITE: 0
HALLUCINATIONS: 0
WEIGHT GAIN: 0

## 2018-05-15 NOTE — PATIENT INSTRUCTIONS
[facility]: [phone]     [facility] Medication Refill Request Information:  * Please contact your pharmacy regarding ANY request for medication refills.  ** [facility] Prescription Fax = [phone]  * Please allow 3 business days for routine medication refills.  * Please allow 5 business days for controlled substance medication refills.     [facility] Test Result notification information:  *You will be notified with in 7-10 days of your appointment day regarding the results of your test.  If you are on MyChart you will be notified as soon as the provider has reviewed the results and signed off on them.             [facility]         Internal Medicine Resident                   Continuity Clinic    Who We Are    Resident Continuity Clinic is a part of the [facility].  Resident physicians see patients independently and establish a relationship with them over the course of their three-year residency program.  As with the Primary Care Clinic, our Resident Continuity Clinic models a group practice.  If your doctor is not available, you will be able to see another resident physician.  At the end of a resident s training, patients will be transitioned to a new resident physician for ongoing care.     We treat patients with a wide array of medical needs from routine physicals, to acute illnesses, to diabetes and blood pressure management, to complex medical illness.  What is a Resident Physician?    Resident physicians hold medical degrees and are doctors. They are training to become specialists in Internal Medicine. They work under the supervision of board-certified faculty physicians.  Expectations for Your Care    We strive to provide accessible, quality care at all times.    In order to provide this care, it is best to see your primary care resident doctor consistently rather switching between providers.  In the event you do see another physician, you should  schedule a follow-up visit with your usual primary care doctor.    If you are transitioning your care from another clinic, it is helpful to have your records available for your doctor to review.    We do not prescribe controlled substances, such as ADD medications or narcotic pain medications, on your first visit.  We will review your health records and concerns prior to devising a treatment plan with you in order to provide the best care.      Clinic Services     Extended clinic hours; patient  to help navigate your visit;  parking; laboratory and imaging services with evening and weekend hours    Multiple medical and surgical specialties in one building    Complementary services, including Nutrition, Integrative Medicine, Pharmacy consultations, Mental and Behavioral Health, Sports Medicine and Physical Therapy    Thank You    We would like to thank you for choosing the [facility] Resident Continuity Clinic for your primary care. You are making a priceless contribution to the training of the next generation of health care practitioners.     Contact us at [phone] for appointments or questions.    Resident Clinic Hours are Tuesdays and Thursdays, 7:30am-5:00pm    Residents  Angelique Casas MD   (Female )   Angeles Bocanegra MD   (Female)   Nino Montes MD  (Male)   Brown Burton MD  (Male)   Komal Tracy MD   (Female)   Stephen Eddy MD  (Male)    Baron Burkett MD  (Male)   Ihsan Potter MD  (Male)   Brown Ruggiero MD (Male)   Aroldo Abraham MD  (Male)   Annemarie Mobley MD (Female)    Diamond Weiner MD (Female)   Noa Page MD  (Male)   Mame Beck MD(Female)   Krystal Crocker MD  (Female)    Supervising Physicians   MD Richelle Hoang MD Briar Duffy, MD James Langland, MD Mary Logeais, MD Tanya Melnik, MD Charles Moldow, MD Heather Thompson Buum, MD Kathleen Watson, MD

## 2018-05-15 NOTE — PROGRESS NOTES
"  PRIMARY CARE CENTER         HPI:       Krish Renee is a 57 year old female with PMH of distal pancreatectomy and total splenectomy in 2006 and DM II here to establish care and to discuss diabetes management.    Patient checks her every morning and will check again in the evening if she is concerned it is high. Her fasting is between   and post prandial runs typically < 180, sometimes over 200. If it is high in the evening will go for a walk.  She believes overall she manages her diabetes well however last month has not been doing as well and has been stress eating.  She follows with endocrinology and last saw them on 12/27/2017 at that time reduced metformin  dose to 1000 mg qday but now up to 1000 mg BID again because she felt her blood sugars were too high.  She also takes 10 U lantus every morning. She has tried to titrate down lantus in conjunction with her doctors in the past however did not like that her fasting level was higher than 110. She walks 2-3 times a day for physical activity and tries to get at least a total of 1 hour a day, sometimes she will get up to 3 hours a day. Also will climb stairs at work.  She thinks her weight has been stable.     She reports some recent stressors with her mother transitioning to living in a nursing home. She reports \"There are some days that I cannot manage my job and her together.\" She feels exhausted.  She takes fluoxetine but ran out of her prescription a few days ago and is awaiting a new prescription from [facility].  She also has intermittent anxiety.  She has no thoughts of harming herself, she more feels sometimes lack of energy but is still able to keep up with her work and her daily responsibilities.     She also reports history of poor sleep.  She has not slept 8 hours straight for a few years. She wakes herself up with her snoring. Her family tells her it is loud and  'crescendos'. She appears to choke and wake up, she thinks this occurs 2-3 " times a night, however her sister thinks it occurs every hour. Her  can't tell because it does not wake him up.  She does not have trouble falling asleep but usually at between 1:30-3 am she is up for the rest of the night.  She has tried laying in the dark and reading however she cannot go back to sleep.  She typically will lie down for a long time and then will get up and read or look at phone, reading the news. She endorses some daytime somnolence but does not think its severe. On the days that she is up at 1:30 am she feels pretty sleepy and has 1 cup of coffee in the morning to sta awake and sometimes an occasional diet coke in the afternoons.  She does try to avoid caffeine in afternoon and evening.  Sleep apnea runs in her family (her sister) and mother snored as well.         ROS:     Constitutional, neuro, ENT, endocrine, pulmonary, cardiac, gastrointestinal, genitourinary, musculoskeletal, integument and psychiatric systems are negative, except as otherwise noted.    Problem, Medication and Allergy Lists were reviewed and are current.  Patient is a new patient to this clinic and so  I reviewed/updated the Past Medical History, the Family History and the Social History.     Past Medical History:   Diagnosis Date     depression      Leiomyoma of uterus, unspecified      NONSPECIFIC MEDICAL HISTORY Aug 2006    neurologic evaluation for CVA-like symptoms, no etiology determined     Type 2 diabetes mellitus (H)      Allergies   Allergen Reactions     No Known Drug Allergy      Past Surgical History:   Procedure Laterality Date     C NONSPECIFIC PROCEDURE  1994    C/S     C NONSPECIFIC PROCEDURE  Nov 2006    splenectomy/distal pancreatectomy at [facility] for benign tumor     C NONSPECIFIC PROCEDURE  1982    wisdom teeth removed     PANCREATECTOMY PARTIAL  2006     Family History   Problem Relation Age of Onset     Depression Mother      Circulatory Mother      blood clots     Arthritis Mother      Crohn Disease  "Mother      s/p ileostomy     Colon Cancer Mother      stage II     DIABETES Father      Hypertension Father      CEREBROVASCULAR DISEASE Father      Depression Father      Alcohol/Drug Father      Lipids Father      Cancer - colorectal Maternal Grandmother      C.A.D. Maternal Grandfather      DIABETES Paternal Grandmother      CEREBROVASCULAR DISEASE Paternal Grandmother      Depression Brother      Alcoholism Brother      Substance Abuse Brother      Allergies Sister      Depression Sister      Depression Son      Anxiety Disorder Son      CANCER Maternal Aunt      uterine     CANCER Brother      non hodgkins lymphoma, and hemolitic anemia     Anemia Brother      hemolytic anemia     No Known Problems Daughter        Social History     Social History     Marital status:      Spouse name: Eldon Renee     Number of children: 2     Years of education: 18     Occupational History      [employer]     Social History Main Topics     Smoking status: Never Smoker     Smokeless tobacco: Never Used     Alcohol use No     Drug use: No     Sexual activity: Yes     Partners: Male     Birth control/ protection: Male Surgical      Comment: vastectomy     Other Topics Concern     Not on file     Social History Narrative     Current Outpatient Prescriptions   Medication     aspirin 81 MG EC tablet     atorvastatin (LIPITOR) 40 MG tablet     blood glucose monitoring (FREESTYLE LITE) test strip     blood glucose monitoring (FREESTYLE) lancets     FLUoxetine (PROZAC) 20 MG capsule     insulin glargine (LANTUS SOLOSTAR) 100 UNIT/ML injection     insulin pen needle 31G X 6 MM     metFORMIN (GLUCOPHAGE-XR) 500 MG 24 hr tablet     No current facility-administered medications for this visit.           Physical Exam:   /70  Pulse 77  Temp 97.7  F (36.5  C)  Ht 1.628 m (5' 4.09\")  Wt 58.9 kg (129 lb 14.4 oz)  LMP 07/17/2013  SpO2 96%  BMI 22.23 kg/m2  Body mass index is 22.23 kg/(m^2).  Vitals were reviewed      "  GENERAL APPEARANCE: healthy, alert and no distress     EYES: EOMI,  PERRL     HENT: ear canals and TM's normal and nose and mouth without ulcers or lesions     NECK: no adenopathy, no asymmetry, masses, or scars and thyroid normal to palpation     RESP: lungs clear to auscultation - no rales, rhonchi or wheezes     CV: regular rates and rhythm, normal S1 S2, no S3 or S4 and no murmur, click or rub     ABDOMEN:  soft, nontender, no HSM or masses and bowel sounds normal     MS: extremities normal- no gross deformities noted, no evidence of inflammation in joints, FROM in all extremities.     SKIN: no suspicious lesions or rashes     NEURO: Normal strength and tone, sensory exam grossly normal, mentation intact and speech normal     PSYCH: mentation appears normal. and affect normal/bright     LYMPHATICS: No cervical adenopathy        Results:     12/12/17 Hgb A1c 5.8     Assessment and Plan     Krish was seen today for establish care.    Diagnoses and all orders for this visit:    # Type 2 diabetes mellitus with insulin deficiency (H)  Patient with well controlled diabetes with fasting and post-prandial blood sugars within goal and most recent Hgb A1c of 5.8.  Discussed with patient if hemoglobin A1c stable or lowers would consider trial of coming down on lantus in the future.    -     Creatinine; Future  -     Albumin Random Urine Quantitative with Creat Ratio  -     Lipid Profile NON-FASTING; Future  -     insulin glargine (LANTUS SOLOSTAR) 100 UNIT/ML pen; Inject 10 Units Subcutaneous every morning        -     metFORMIN (GLUCOPHAGE-XR) 500 MG 24 hr tablet; Take 4 tablets (2,000 mg) by mouth daily     # Hypercholesteremia  -     atorvastatin (LIPITOR) 40 MG tablet; Take 1 tablet (40 mg) by mouth daily    # Apnea  # Sleep disorder  Patient has 3 STOP-BANG risk factors (snoring, somnolence, and observed apnea) making her at intermediate risk for sleep apnea.  In addition reporting difficulty staying asleep. Patient  would refer benefit from further evaluation in sleep clinic and possible polysomnography.   -     SLEEP EVALUATION & MANAGEMENT REFERRAL - ADULT     # Depression, unspecified depression type  Patient reports history of depression has been on fluoxetine for many years. Currently has increased life stressors but has been able to engage in usual daily activities without issue and denies thoughts of self-harm. [facility] so will fill 7 day prescription so she has medication until her full prescription arrives.   -     FLUoxetine (PROZAC) 20 MG capsule; Take 3 capsules (60 mg) by mouth daily    Options for treatment and follow-up care were reviewed with the patient. Krish Renee engaged in the decision making process and verbalized understanding of the options discussed and agreed with the final plan.    Komal Tracy MD PhD  PGY-1, Internal Medicine  [phone]   May 15, 2018    Pt was seen and plan of care discussed with Dr. Palmer.   Pt was seen and examined with Dr. Tracy.  I agree with her documentation as noted above.    My additional comments: None    Chuck Palmer

## 2018-05-15 NOTE — MR AVS SNAPSHOT
After Visit Summary   5/15/2018    Krish Renee    MRN: [id number]           Patient Information     Date Of Birth          1960        Visit Information        Provider Department      5/15/2018 2:05 PM Carline Tracy MD [facility]        Today's Diagnoses     Apnea    -  1    Hypercholesteremia        Irregular uterine bleeding        Pregnancy examination or test, pregnancy unconfirmed        Type 2 diabetes mellitus with insulin deficiency (H)        Type 2 diabetes mellitus with complication, unspecified long term insulin use status (H)        Depression, unspecified depression type          Care Instructions    Primary Care Center: [phone]     Primary Care Center Medication Refill Request Information:  * Please contact your pharmacy regarding ANY request for medication refills.  ** [facility] Prescription Fax = [phone]  * Please allow 3 business days for routine medication refills.  * Please allow 5 business days for controlled substance medication refills.     Primary Care Center Test Result notification information:  *You will be notified with in 7-10 days of your appointment day regarding the results of your test.  If you are on MyChart you will be notified as soon as the provider has reviewed the results and signed off on them.             [facility]         Internal Medicine Resident                   Continuity Clinic    Who We Are    Resident Continuity Clinic is a part of the [facility].  Resident physicians see patients independently and establish a relationship with them over the course of their three-year residency program.  As with the Primary Care Clinic, our Resident Continuity Clinic models a group practice.  If your doctor is not available, you will be able to see another resident physician.  At the end of a resident s training, patients will be transitioned to a new resident physician for ongoing care.     We  treat patients with a wide array of medical needs from routine physicals, to acute illnesses, to diabetes and blood pressure management, to complex medical illness.  What is a Resident Physician?    Resident physicians hold medical degrees and are doctors. They are training to become specialists in Internal Medicine. They work under the supervision of board-certified faculty physicians.  Expectations for Your Care    We strive to provide accessible, quality care at all times.    In order to provide this care, it is best to see your primary care resident doctor consistently rather switching between providers.  In the event you do see another physician, you should schedule a follow-up visit with your usual primary care doctor.    If you are transitioning your care from another clinic, it is helpful to have your records available for your doctor to review.    We do not prescribe controlled substances, such as ADD medications or narcotic pain medications, on your first visit.  We will review your health records and concerns prior to devising a treatment plan with you in order to provide the best care.      Clinic Services     Extended clinic hours; patient  to help navigate your visit;  parking; laboratory and imaging services with evening and weekend hours    Multiple medical and surgical specialties in one building    Complementary services, including Nutrition, Integrative Medicine, Pharmacy consultations, Mental and Behavioral Health, Sports Medicine and Physical Therapy    Thank You    We would like to thank you for choosing the [facility] Internal Medicine Resident Continuity Clinic for your primary care. You are making a priceless contribution to the training of the next generation of health care practitioners.     Contact us at [phone] for appointments or questions.    Resident Clinic Hours are Tuesdays and Thursdays, 7:30am-5:00pm    Residents  Angelique Casas MD   (Female  )   Angeles Bocanegra MD   (Female)   Nino Montes MD  (Male)   Brown Burton MD  (Male)   Komal Tracy MD   (Female)   Stephen Eddy MD  (Male)    Baron Burkett MD  (Male)   Ihsan Potter MD  (Male)   Brown Ruggiero MD (Male)   Aroldo Abraham MD  (Male)   Annemarie Mobley MD (Female)    Diamond Weiner MD (Female)   Noa Page MD  (Male)   Mame Beck MD(Female)   Krystal Crocker MD  (Female)    Supervising Physicians   MD Richelle Hoang, MD John Paul Caicedo, MD Arina Monet, MD Ariane Varela, MD Gayla Barnes MD Kathleen Watson, MD                  Follow-ups after your visit        Additional Services     SLEEP EVALUATION & MANAGEMENT REFERRAL - [facility] / [facility]  [phone] (Age 2 and up)       Please be aware that coverage of these services is subject to the terms and limitations of your health insurance plan.  Call member services at your health plan with any benefit or coverage questions.      Please bring the following to your appointment:    >>   List of current medications   >>   This referral request   >>   Any documents/labs given to you for this referral                      Future tests that were ordered for you today     Open Future Orders        Priority Expected Expires Ordered    Hemoglobin A1c Routine 5/15/2018 5/29/2018 5/15/2018    Creatinine Routine 5/15/2018 5/15/2019 5/15/2018    Lipid Profile NON-FASTING Routine 5/15/2018 5/29/2018 5/15/2018            Who to contact     Please call your clinic at [phone] to:    Ask questions about your health    Make or cancel appointments    Discuss your medicines    Learn about your test results    Speak to your doctor            Additional Information About Your Visit        MyChart Information     [employer] gives you secure access to your electronic health record. If you see a primary care  "provider, you can also send messages to your care team and make appointments. If you have questions, please call your primary care clinic.  If you do not have a primary care provider, please call [phone] and they will assist you.      [employer] is an electronic gateway that provides easy, online access to your medical records. With [employer], you can request a clinic appointment, read your test results, renew a prescription or communicate with your care team.     To access your existing account, please contact your [facility] or call [phone] for assistance.        Care EveryWhere ID     This is your Care EveryWhere ID. This could be used by other organizations to access your [facility] medical records  [id number]        Your Vitals Were     Pulse Temperature Height Last Period Pulse Oximetry BMI (Body Mass Index)    77 97.7  F (36.5  C) 1.628 m (5' 4.09\") 07/17/2013 96% 22.23 kg/m2       Blood Pressure from Last 3 Encounters:   05/15/18 102/70   12/27/17 120/74   04/04/17 101/68    Weight from Last 3 Encounters:   05/15/18 58.9 kg (129 lb 14.4 oz)   12/27/17 57.4 kg (126 lb 9.6 oz)   04/04/17 58.6 kg (129 lb 1.6 oz)              We Performed the Following     Albumin Random Urine Quantitative with Creat Ratio     SLEEP EVALUATION & MANAGEMENT REFERRAL - [facility] / [facility]  [phone] (Age 2 and up)          Today's Medication Changes          These changes are accurate as of 5/15/18  3:29 PM.  If you have any questions, ask your nurse or doctor.               Start taking these medicines.        Dose/Directions    atorvastatin 40 MG tablet   Commonly known as:  LIPITOR   Used for:  Hypercholesteremia   Started by:  Carline Tracy MD        Dose:  40 mg   Take 1 tablet (40 mg) by mouth daily   Quantity:  30 tablet   Refills:  3       FLUoxetine 20 MG capsule   Commonly known as:  PROzac   Used for:  Depression, unspecified " depression type   Started by:  Carline Tracy MD        Dose:  60 mg   Take 3 capsules (60 mg) by mouth daily   Quantity:  21 capsule   Refills:  0       insulin glargine 100 UNIT/ML injection   Commonly known as:  LANTUS SOLOSTAR   Used for:  Type 2 diabetes mellitus with insulin deficiency (H)   Started by:  Carline Tracy MD        Dose:  10 Units   Inject 10 Units Subcutaneous every morning   Quantity:  15 mL   Refills:  3       metFORMIN 500 MG 24 hr tablet   Commonly known as:  GLUCOPHAGE-XR   Used for:  Type 2 diabetes mellitus with complication, unspecified long term insulin use status (H)   Started by:  Carline Tracy MD        Dose:  2000 mg   Take 4 tablets (2,000 mg) by mouth daily *MUST BE SEEN FOR FURTHER REFILLS   Quantity:  120 tablet   Refills:  0            Where to get your medicines      These medications were sent to [facility] [id number] [place] [facility] - [place], [place] - [address] [place] [address]     Phone:  [phone]     FLUoxetine 20 MG capsule         These medications were sent to [facility] - [place], [place] - [address] [place] [address]  [address] [place] [address]     Phone:  [phone]     atorvastatin 40 MG tablet    insulin glargine 100 UNIT/ML injection    metFORMIN 500 MG 24 hr tablet                Primary Care Provider Office Phone # Fax #    Ariane Abdulaziz Varela [phone]       [facility] [address] [facility] [address]        Equal Access to Services     MARYA ELKINS AH: Hadruss basilioo Sotanner, waaxda luqadaha, qaybta kaalmada tram, laly macario. So [facility] [phone].    ATENCIÓN: Si habla español, tiene a gardner disposición servicios gratuitos de asistencia lingüística. Llame al [phone].    We comply with applicable federal civil rights laws and [place] laws. We do not discriminate on the basis of race, color, national origin,  age, disability, sex, sexual orientation, or gender identity.            Thank you!     Thank you for choosing [facility]  for your care. Our goal is always to provide you with excellent care. Hearing back from our patients is one way we can continue to improve our services. Please take a few minutes to complete the written survey that you may receive in the mail after your visit with us. Thank you!             Your Updated Medication List - Protect others around you: Learn how to safely use, store and throw away your medicines at www.disposemymeds.org.          This list is accurate as of 5/15/18  3:29 PM.  Always use your most recent med list.                   Brand Name Dispense Instructions for use Diagnosis    aspirin 81 MG EC tablet     90 tablet    Take 1 tablet (81 mg) by mouth daily    Type 2 diabetes mellitus without complication (H)       atorvastatin 40 MG tablet    LIPITOR    30 tablet    Take 1 tablet (40 mg) by mouth daily    Hypercholesteremia       blood glucose monitoring lancets     4 Box    Use to test blood sugars 4 times daily or as directed.    Diabetes mellitus, type 2 (H)       blood glucose monitoring test strip    FREESTYLE LITE    4 Box    Use to test blood sugars 4 times daily or as directed.    Diabetes mellitus, type 2 (H)       FLUoxetine 20 MG capsule    PROzac    21 capsule    Take 3 capsules (60 mg) by mouth daily    Depression, unspecified depression type       insulin glargine 100 UNIT/ML injection    LANTUS SOLOSTAR    15 mL    Inject 10 Units Subcutaneous every morning    Type 2 diabetes mellitus with insulin deficiency (H)       insulin pen needle 31G X 6 MM     100 each    Use 1 X daily or as directed.    Diabetes mellitus (H)       metFORMIN 500 MG 24 hr tablet    GLUCOPHAGE-XR    120 tablet    Take 4 tablets (2,000 mg) by mouth daily *MUST BE SEEN FOR FURTHER REFILLS    Type 2 diabetes mellitus with complication, unspecified long term insulin use status  (H)

## 2018-05-15 NOTE — NURSING NOTE
Chief Complaint   Patient presents with     Establish Care     diabetes check; medication refill.       SMA Jr at 2:21 PM on 5/15/2018

## 2018-05-16 ENCOUNTER — TELEPHONE (OUTPATIENT)
Dept: INTERNAL MEDICINE | Facility: CLINIC | Age: 58
End: 2018-05-16

## 2018-05-16 DIAGNOSIS — F32.A DEPRESSION, UNSPECIFIED DEPRESSION TYPE: ICD-10-CM

## 2018-05-18 NOTE — TELEPHONE ENCOUNTER
Spoke with pt, states that she has been taking generic fluoxetine, not brand name Prozac. PA not needed.  I updated med list.

## 2018-05-18 NOTE — TELEPHONE ENCOUNTER
Prior Authorization Not Needed per Insurance    Medication: (PROZAC) 20 MG capsule-PA Not Needed  Insurance Company:    Expected CoPay:      Pharmacy Filling the [id number] [place], [place] - [address] [place] [address]  Pharmacy Notified: No  Patient Notified: No    Patient is taking generic. No PA needed for brand.

## 2018-05-21 NOTE — NURSING NOTE
"  1.  Reason for the visit:  Snoring  2.  Referring provider and clinic name:  PCP  3.  Previous Sleep Doctor or Pulmonlogist (clinic name)?  N/A  4.  Records, Procedures, Imaging, and Labs (see below)  N/A    All NOTES from previous office visits that pertain to why they are being seen in the Sleep Center    Previous Sleep Studies, Chest CT, Echos and reports that pertain to why they are seeing Sleep Center    All Sleep records that have been done in the last 2 years that pertain to why they are seeing Sleep Center        Are they being seen for continuation of care for Cpap/Bipap/Avap/Trilogy/Dental Device? N/A    If yes to above Who and Where was Device issued/currently getting supplies from\" N/A       Are you currently on \"Supplemental Oxygen\" during the day or night?                                                                                                                                                         Please remind pt to bring Cpap machine and ask to arrive 15 minutes early to appointment due traffic and congestion                                                     5. Pt Sleep Center Packet received Yes via email on 05/18/18  Yes: \"please make sure that you bring this to your appointment completed, either the doctor will not see you until this completed or you may be asked to reschedule your appointment.\"   No: mail or email to the pt and explain, \"please make sure that you bring this to your appointment completed, either the doctor will not see you until this completed or you may be asked to reschedule your appointment.\"     ~If pt coming early to fill packet out, ask that they come 30 minutes prior to their appointment~     6. Has the pt's medication list been updated and preferred pharmacy added? No changes ([facility])    7. Has the allergy list been reviewed?Yes no change    \"Thank you for choosing [facility] [place] and we look forward to seeing you at your upcoming appointment\" "   Yolanda [facility] [place] [facility]

## 2018-06-06 ENCOUNTER — OFFICE VISIT (OUTPATIENT)
Dept: SLEEP MEDICINE | Facility: CLINIC | Age: 58
End: 2018-06-06
Attending: INTERNAL MEDICINE
Payer: COMMERCIAL

## 2018-06-06 VITALS
BODY MASS INDEX: 22.02 KG/M2 | OXYGEN SATURATION: 93 % | HEIGHT: 64 IN | SYSTOLIC BLOOD PRESSURE: 97 MMHG | HEART RATE: 78 BPM | DIASTOLIC BLOOD PRESSURE: 67 MMHG | WEIGHT: 129 LBS | RESPIRATION RATE: 16 BRPM

## 2018-06-06 DIAGNOSIS — G47.00 INSOMNIA, UNSPECIFIED TYPE: ICD-10-CM

## 2018-06-06 DIAGNOSIS — G47.10 HYPERSOMNOLENCE: ICD-10-CM

## 2018-06-06 DIAGNOSIS — R11.0 NAUSEA: ICD-10-CM

## 2018-06-06 DIAGNOSIS — R23.2 FLUSHING: ICD-10-CM

## 2018-06-06 DIAGNOSIS — R06.83 SNORING: Primary | ICD-10-CM

## 2018-06-06 DIAGNOSIS — G47.30 OBSERVED SLEEP APNEA: ICD-10-CM

## 2018-06-06 PROCEDURE — 99244 OFF/OP CNSLTJ NEW/EST MOD 40: CPT | Performed by: INTERNAL MEDICINE

## 2018-06-06 NOTE — PATIENT INSTRUCTIONS
"MY TREATMENT INFORMATION FOR SLEEP APNEA-  Krish Renee    DOCTOR : Vibha Campos  SLEEP CENTER :      MY CONTACT NUMBER:     Am I having a sleep study at a sleep center?  Make sure you have an appointment for the study before you leave!    Am I having a home sleep study?  Watch this video:  [email or web address]?[email or web address]  Please verify your insurance coverage with your insurance carrier    Frequently asked questions:  1. What is Obstructive Sleep Apnea (ELEONORA)? ELEONORA is the most common type of sleep apnea. Apnea means, \"without breath.\"  Apnea is most often caused by narrowing or collapse of the upper airway as muscles relax during sleep.   Almost everyone has occasional apneas. Most people with sleep apnea have had brief interruptions at night frequently for many years.  The severity of sleep apnea is related to how frequent and severe the events are.   2. What are the consequences of ELEONORA? Symptoms include: feeling sleepy during the day, snoring loudly, gasping or stopping of breathing, trouble sleeping, and occasionally morning headaches or heartburn at night.  Sleepiness can be serious and even increase the risk of falling asleep while driving. Other health consequences may include development of high blood pressure and other cardiovascular disease in persons who are susceptible. Untreated ELEONORA  can contribute to heart disease, stroke and diabetes.   3. What are the treatment options? In most situations, sleep apnea is a lifelong disease that must be managed with daily therapy. Medications are not effective for sleep apnea and surgery is generally not considered until other therapies have been tried. Your treatment is your choice . Continuous Positive Airway (CPAP) works right away and is the therapy that is effective in nearly everyone. An oral device to hold your jaw forward is usually the next most reliable option. Other options include postioning " devices (to keep you off your back), weight loss, and surgery including a tongue pacing device. There is more detail about some of these options below.    Important tips for using CPAP and similar devices   Know your equipment:  CPAP is continuous positive airway pressure that prevents obstructive sleep apnea by keeping the throat from collapsing while you are sleeping. In most cases, the device is  smart  and can slowly self-adjusts if your throat collapses and keeps a record every day of how well you are treated-this information is available to you and your care team.  BPAP is bilevel positive airway pressure that keeps your throat open and also assists each breath with a pressure boost to maintain adequate breathing.  Special kinds of BPAP are used in patients who have inadequate breathing from lung or heart disease. In most cases, the device is  smart  and can slowly self-adjusts to assist breathing. Like CPAP, the device keeps a record of how well you are treated.  Your mask is your connection to the device. You get to choose what feels most comfortable and the staff will help to make sure if fits. Here: are some examples of the different masks that are available:       Key points to remember on your journey with sleep apnea:  1. Sleep study.  PAP devices often need to be adjusted during a sleep study to show that they are effective and adjusted right.  2. Good tips to remember: Try wearing just the mask during a quiet time during the day so your body adapts to wearing it. A humidifier is recommended for comfort in most cases to prevent drying of your nose and throat. Allergy medication from your provider may help you if you are having nasal congestion.  3. Getting settled-in. It takes more than one night for most of us to get used to wearing a mask. Try wearing just the mask during a quiet time during the day so your body adapts to wearing it. A humidifier is recommended for comfort in most cases. Our team  will work with you carefully on the first day and will be in contact within 4 days and again at 2 and 4 weeks for advice and remote device adjustments. Your therapy is evaluated by the device each day.   4. Use it every night. The more you are able to sleep naturally for 7-8 hours, the more likely you will have good sleep and to prevent health risks or symptoms from sleep apnea. Even if you use it 4 hours it helps. Occasionally all of us are unable to use a medical therapy, in sleep apnea, it is not dangerous to miss one night.   5. Communicate. Call our skilled team on the number provided on the first day if your visit for problems that make it difficult to wear the device. Over 2 out of 3 patients can learn to wear the device long-term with help from our team. Remember to call our team or your sleep providers if you are unable to wear the device as we may have other solutions for those who cannot adapt to mask CPAP therapy. It is recommended that you sleep your sleep provider within the first 3 months and yearly after that if you are not having problems.   Take care of your equipment. Make sure you clean your mask and tubing using directions every day and that your filter and mask are replaced as recommended or if they are not working.     BESIDES CPAP, WHAT OTHER THERAPIES ARE THERE?    Positioning Device  Positioning devices are generally used when sleep apnea is mild and only occurs on your back.This example shows a pillow that straps around the waist. It may be appropriate for those whose sleep study shows milder sleep apnea that occurs primarily when lying flat on one's back. Preliminary studies have shown benefit but effectiveness at home may need to be verified by a home sleep test. These devices are generally not covered by medical insurance.  Examples of devices that maintain sleeping on the back to prevent snoring and mild sleep apnea.    Belt type body positioner  Http://TimeLab/    Electronic  reminder  Http://nightshifttherapy.com/  Http://www.cliniq.ly.com.au/    Oral Appliance  What is oral appliance therapy?  An oral appliance device fits on your teeth at night like a retainer used after having braces. The device is made by a specialized dentist and requires several visits over 1-2 months before a manufactured device is made to fit your teeth and is adjusted to prevent your sleep apnea. Once an oral device is working properly, snoring should be improved. A home sleep test may be recommended at that time if to determine whether the sleep apnea is adequately treated.       Some things to remember:  -Oral devices are often, but not always, covered by your medical insurance. Be sure to check with your insurance provider.   -If you are referred for oral therapy, you will be given a list of specialized dentists to consider or you may choose to visit the Web site of the American Academy of Dental Sleep Medicine  -Oral devices are less likely to work if you have severe sleep apnea or are extremely overweight.     More detailed information  An oral appliance is a small acrylic device that fits over the upper and lower teeth  (similar to a retainer or a mouth guard). This device slightly moves jaw forward, which moves the base of the tongue forward, opens the airway, improves breathing for effective treat snoring and obstructive sleep apnea in perhaps 7 out of 10 people .  The best working devices are custom-made by a dental device  after a mold is made of the teeth 1, 2, 3.  When is an oral appliance indicated?  Oral appliance therapy is recommended as a first-line treatment for patients with primary snoring, mild sleep apnea, and for patients with moderate sleep apnea who prefer appliance therapy to use of CPAP4, 5. Severity of sleep apnea is determined by sleep testing and is based on the number of respiratory events per hour of sleep.   How successful is oral appliance therapy?  The success rate  of oral appliance therapy in patients with mild sleep apnea is 75-80% while in patients with moderate sleep apnea it is 50-70%. The chance of success in patients with severe sleep apnea is 40-50%. The research also shows that oral appliances have a beneficial effect on the cardiovascular health of ELEONORA patients at the same magnitude as CPAP therapy7.  Oral appliances should be a second-line treatment in cases of severe sleep apnea, but if not completely successful then a combination therapy utilizing CPAP plus oral appliance therapy may be effective. Oral appliances tend to be effective in a broad range of patients although studies show that the patients who have the highest success are females, younger patients, those with milder disease, and less severe obesity. 3, 6.   Finding a dentist that practices dental sleep medicine  Specific training is available through the American Academy of Dental Sleep Medicine for dentists interested in working in the field of sleep. To find a dentist who is educated in the field of sleep and the use of oral appliances, near you, visit the Web site of the American Academy of Dental Sleep Medicine.    References  1. Fredy et al. Objectively measured vs self-reported compliance during oral appliance therapy for sleep-disordered breathing. Chest 2013; 144(5): 3484-9962.  2. Keith, et al. Objective measurement of compliance during oral appliance therapy for sleep-disordered breathing. Thorax 2013; 68(1): 91-96.  3. Bobby et al. Mandibular advancement devices in 620 men and women with ELEONORA and snoring: tolerability and predictors of treatment success. Chest 2004; 125: 4742-5814.  4. Oliver, et al. Oral appliances for snoring and ELEONORA: a review. Sleep 2006; 29: 244-262.  5. Edwin et al. Oral appliance treatment for ELEONORA: an update. J Clin Sleep Med 2014; 10(2): 215-227.  6. Dave et al. Predictors of OSAH treatment outcome. J Dent Res 2007; 86: 3037-1660.       Weight Loss:    Weight loss is a long-term strategy that may improve sleep apnea in some patients.    Weight management is a personal decision and the decision should be based on your interest and the potential benefits.  If you are interested in exploring weight loss strategies, the following discussion covers the impact on weight loss on sleep apnea and the approaches that may be successful.    Being overweight does not necessarily mean you will have health consequences.  Those who have BMI over 35 or over 27 with existing medical conditions carries greater risk.   Weight loss decreases severity of sleep apnea in most people with obesity. For those with mild obesity who have developed snoring with weight gain, even 15-30 pound weight loss can improve and occasionally eliminate sleep apnea.  Structured and life-long dietary and health habits are necessary to lose weight and keep healthier weight levels.     Though there may be significant health benefits from weight loss, long-term weight loss is very difficult to achieve- studies show success with dietary management in less than 10% of people. In addition, substantial weight loss may require years of dietary control and may be difficult if patients have severe obesity. In these cases, surgical management may be considered.  Finally, older individuals who have tolerated obesity without health complications may be less likely to benefit from weight loss strategies.        Your BMI is Body mass index is 22.14 kg/(m^2).  Weight management is a personal decision.  If you are interested in exploring weight loss strategies, the following discussion covers the approaches that may be successful. Body mass index (BMI) is one way to tell whether you are at a healthy weight, overweight, or obese. It measures your weight in relation to your height.  A BMI of 18.5 to 24.9 is in the healthy range. A person with a BMI of 25 to 29.9 is considered overweight, and someone with a BMI  of 30 or greater is considered obese. More than two-thirds of American adults are considered overweight or obese.  Being overweight or obese increases the risk for further weight gain. Excess weight may lead to heart disease and diabetes.  Creating and following plans for healthy eating and physical activity may help you improve your health.  Weight control is part of healthy lifestyle and includes exercise, emotional health, and healthy eating habits. Careful eating habits lifelong are the mainstay of weight control. Though there are significant health benefits from weight loss, long-term weight loss with diet alone may be very difficult to achieve- studies show long-term success with dietary management in less than 10% of people. Attaining a healthy weight may be especially difficult to achieve in those with severe obesity. In some cases, medications, devices and surgical management might be considered.  What can you do?  If you are overweight or obese and are interested in methods for weight loss, you should discuss this with your provider.     Consider reducing daily calorie intake by 500 calories.     Keep a food journal.     Avoiding skipping meals, consider cutting portions instead.    Diet combined with exercise helps maintain muscle while optimizing fat loss. Strength training is particularly important for building and maintaining muscle mass. Exercise helps reduce stress, increase energy, and improves fitness. Increasing exercise without diet control, however, may not burn enough calories to loose weight.       Start walking three days a week 10-20 minutes at a time    Work towards walking thirty minutes five days a week     Eventually, increase the speed of your walking for 1-2 minutes at time    In addition, we recommend that you review healthy lifestyles and methods for weight loss available through the National Institutes of Health patient information sites:   http://win.niddk.nih.gov/publications/index.htm    And look into health and wellness programs that may be available through your health insurance provider, employer, local community center, or lana club.    Weight management plan: Keep walking   Dental appliance resources recommended by [facility]        [facility]   [facility]   Alden Colón DDS, MS     [facility]  [address] [place] [address] [address]  [place] [address]   Appointments: [phone] Ext: [phone]  Fax: [phone]   [email or web address]   Dental and Oral Surgery Clinic   Cory Wilson DDS   [address]   [place] [address]   Appointments: [phone]   [email or web address]   [email or web address]       Snoring and Sleep Apnea   [facility]   Jerry Albright DDS   [address]   [address]   [place] [address]   Appointments: [phone]   Fax: [phone]   [employer]       [facility]  Steven Trent DDS- takes medicare  [address], [place] [address]  [address], [address], [place] [address]  [phone]; [phone] (fax)  [employer]    [facility]  Jose A Joiner DDS  [facility]  [address]  [phone]  [place] [address]  [facility]   [facility] [email or web address]].[email or web address]   Cory Wilson DDS   [employer]   [address],   [address]   [place] [address]   Appointments: [phone]   Fax: [phone]     [place] [facility]   Albaro Spears DDS, MS   [DME Medicare]  [facility]   [address]   [address]   [place] [address]   Appointments: [phone]   Fax: [phone]   Dr. Spears accepts Medicaid patients.     [place] [facility]  Ene Hernández BDS, MS  [address]  [address]   [place] [address]  Appointments: [phone]  Fax: [phone]    [facility]  Keon Adams DEBRA  [DME Medicare]  [address]  [address]  [place] [address]  Appointments [phone]  Fax: [phone]    +[facility]   [facility].com     Hawa Woods DDS, PhD, [facility]   [facility]   [address],   [address]   [place] [address]   Appointments: [phone]   Fax: [phone]     [facility]   [address]   [address]   [place] [address]   Appointments: [phone]   Fax: [phone]     [facility]  Stephanie Wiley DDS  [facility]  [address] [place] [address] [phone] (Office)   [phone] (Fax    If you wish to choose your own dental sleep dentist, you may identify a provider close to you: [email or web address]      Surgery:    Surgery for obstructive sleep apnea is considered generally only when other therapies fail to work. Surgery may be discussed with you if you are having a difficult time tolerating CPAP and or when there is an abnormal structure that requires surgical correction.  Nose and throat surgeries often enlarge the airway to prevent collapse.  Most of these surgeries create pain for 1-2 weeks and up to half of the most common surgeries are not effective throughout life.  You should carefully discuss the benefits and drawbacks to surgery with your sleep provider and surgeon to determine if it is the best solution for you.   More information  Surgery for ELEONORA is directed at areas that are responsible for narrowing or complete obstruction of the airway during sleep.  There are a wide range of procedures available to enlarge and/or stabilize the airway to prevent blockage of breathing in the three major areas where it can occur: the palate, tongue, and nasal regions.  Successful surgical treatment  depends on the accurate identification of the factors responsible for obstructive sleep apnea in each person.  A personalized approach is required because there is no single treatment that works well for everyone.  Because of anatomic variation, consultation with an examination by a sleep surgeon is a critical first step in determining what surgical options are best for each patient.  In some cases, examination during sedation may be recommended in order to guide the selection of procedures.  Patients will be counseled about risks and benefits as well as the typical recovery course after surgery. Surgery is typically not a cure for a person s ELEONORA.  However, surgery will often significantly improve one s ELEONORA severity (termed  success rate ).  Even in the absence of a cure, surgery will decrease the cardiovascular risk associated with OSA7; improve overall quality of life8 (sleepiness, functionality, sleep quality, etc).      Palate Procedures:  Patients with ELEONORA often have narrowing of their airway in the region of their tonsils and uvula.  The goals of palate procedures are to widen the airway in this region as well as to help the tissues resist collapse.  Modern palate procedure techniques focus on tissue conservation and soft tissue rearrangement, rather than tissue removal.  Often the uvula is preserved in this procedure. Residual sleep apnea is common in patient after pharyngoplasty with an average reduction in sleep apnea events of 33%2.      Tongue Procedures:  ExamWhile patients are awake, the muscles that surround the throat are active and keep this region open for breathing. These muscles relax during sleep, allowing the tongue and other structures to collapse and block breathing.  There are several different tongue procedures available.  Selection of a tongue base procedure depends on characteristics seen on physical exam.  Generally, procedures are aimed at removing bulky tissues in this area or  preventing the back of the tongue from falling back during sleep.  Success rates for tongue surgery range from 50-62%3.    Hypoglossal Nerve Stimulation:  Hypoglossal nerve stimulation has recently received approval from the [place] Food and Drug Administration for the treatment of obstructive sleep apnea.  This is based on research showing that the system was safe and effective in treating sleep apnea6.  Results showed that the median AHI score decreased 68%, from 29.3 to 9.0. This therapy uses an implant system that senses breathing patterns and delivers mild stimulation to airway muscles, which keeps the airway open during sleep.  The system consists of three fully implanted components: a small generator (similar in size to a pacemaker), a breathing sensor, and a stimulation lead.  Using a small handheld remote, a patient turns the therapy on before bed and off upon awakening.    Candidates for this device must be greater than 22 years of age, have moderate to severe ELEONORA (AHI between 20-65), BMI less than 32, have tried CPAP/oral appliance without success, and have appropriate upper airway anatomy (determined by a sleep endoscopy performed by Dr. Quintero).    Hypoglossal Nerve Stimulation Pathway:    The sleep surgeon s office will work with the patient through the insurance prior-authorization process (including communications and appeals).    Nasal Procedures:  Nasal obstruction can interfere with nasal breathing during the day and night.  Studies have shown that relief of nasal obstruction can improve the ability of some patients to tolerate positive airway pressure therapy for obstructive sleep apnea1.  Treatment options include medications such as nasal saline, topical corticosteroid and antihistamine sprays, and oral medications such as antihistamines or decongestants. Non-surgical treatments can include external nasal dilators for selected patients. If these are not successful by themselves, surgery can  improve the nasal airway either alone or in combination with these other options.      Combination Procedures:  Combination of surgical procedures and other treatments may be recommended, particularly if patients have more than one area of narrowing or persistent positional disease.  The success rate of combination surgery ranges from 66-80%2,3.    References  1. Wilson SHEA. The Role of the Nose in Snoring and Obstructive Sleep Apnoea: An Update.  Eur Arch Otorhinolaryngol. 2011; 268: 1365-73.  2.  Cindy SM; Mariaa JA; Zenia JR; Pallanch JF; Teddy MB; Charanjit SG; Bandar HERNÁNDEZ. Surgical modifications of the upper airway for obstructive sleep apnea in adults: a systematic review and meta-analysis. SLEEP 2010;33(10):9411-3662. Arjun DREW. Hypopharyngeal surgery in obstructive sleep apnea: an evidence-based medicine review.  Arch Otolaryngol Head Neck Surg. 2006 Feb;132(2):206-13.  3. Eric YH1, Omega Y, Frankie ANDREA. The efficacy of anatomically based multilevel surgery for obstructive sleep apnea. Otolaryngol Head Neck Surg. 2003 Oct;129(4):327-35.  4. Arjun DREW, Goldberg A. Hypopharyngeal Surgery in Obstructive Sleep Apnea: An Evidence-Based Medicine Review. Arch Otolaryngol Head Neck Surg. 2006 Feb;132(2):206-13.  5. Nito HUNTLEY et al. Upper-Airway Stimulation for Obstructive Sleep Apnea.  N Engl J Med. 2014 Jan 9;370(2):139-49.  6. Michelle Y et al. Increased Incidence of Cardiovascular Disease in Middle-aged Men with Obstructive Sleep Apnea. Am J Respir Crit Care Med; 2002 166: 159-165  7. Robbins EM et al. Studying Life Effects and Effectiveness of Palatopharyngoplasty (SLEEP) study: Subjective Outcomes of Isolated Uvulopalatopharyngoplasty. Otolaryngol Head Neck Surg. 2011; 144: 623-631.

## 2018-06-06 NOTE — PROGRESS NOTES
Chief complaint: Consultation requested by Chuck Palmer MD for evaluation of snoring and observed apneas    History of Present Illness: 57-year-old female with years of snoring and difficulty maintaining sleep.  She reports typically going to bed around 830-10  PM and having no difficulties falling asleep.  She usually falls asleep within 5 minutes.  She does wake up at night sometimes around 2 to 2:30 in the morning.  Often she will have difficulty falling back asleep.  She will rest in bed or go to the bathroom.  She may eventually fall back asleep and then is usually out of bed by 5:55 in the morning.  She rarely takes naps but on occasion she may take a very long nap.  She has woken herself up with sort of a choking sensation.  Mostly however she has been told about crescendo snoring that abruptly terminates with audible choking.  She is sleepy during the day.  She falls asleep often while reading.  She is able to do her work-related duties however she can be a little sleepy in meetings.  She drinks only 1 caffeinated beverage a day.  She denies symptoms of restlessness no sleepwalking or sleep talking.  She does wake up about once a month screaming from a nightmare but is able to fall back asleep very quickly.    Patient denies difficulty breathing during the day.  She has no problems breathing through her nose.  She does wake up with a dry mouth in the morning.  She usually sleeps on her back it is as it is uncomfortable to sleep on her sides due to shoulder pain.  She does have a mouthguard which she occasionally uses for bruxism. Patient is otherwise up-to-date on her own dental care.  She believes her dentist may be able to create a device for sleep apnea. There is a family history of sleep apnea, her sister.  Sister is not using her CPAP.      She does admit that some of her daytime fatigue may be associated with recurrent episodes of nausea followed by flushing.  This happens 10-20 times a day and but  can be quite distressing.  She has discussed this already with a couple of providers including gynecology, its thought may be related to menopause.  Pancreatic tumor had been benign and was not carcinoid.    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        Social History     Social History     Marital status:      Spouse name: Eldon Renee     Number of children: 2     Years of education: 18     Occupational History      [employer]     Social History Main Topics     Smoking status: Never Smoker     Smokeless tobacco: Never Used     Alcohol use No     Drug use: No     Sexual activity: Yes     Partners: Male     Birth control/ protection: Male Surgical      Comment: vastectomy     Other Topics Concern     Not on file     Social History Narrative       Family History   Problem Relation Age of Onset     Depression Mother      Circulatory Mother      blood clots     Arthritis Mother      Crohn Disease Mother      s/p ileostomy     Colon Cancer Mother      stage II     DIABETES Father      Hypertension Father      CEREBROVASCULAR DISEASE Father       "Depression Father      Alcohol/Drug Father      Lipids Father      Cancer - colorectal Maternal Grandmother      C.A.D. Maternal Grandfather      DIABETES Paternal Grandmother      CEREBROVASCULAR DISEASE Paternal Grandmother      Depression Brother      Alcoholism Brother      Substance Abuse Brother      Allergies Sister      Depression Sister      Sleep Apnea Sister      Depression Son      Anxiety Disorder Son      CANCER Maternal Aunt      uterine     CANCER Brother      non hodgkins lymphoma, and hemolitic anemia     Anemia Brother      hemolytic anemia     No Known Problems Daughter        Review of Systems: Positve for depression/anxiety on medication, new mole need appt to eval,  and per HPI, otherwise comprehensive review of systems is negative.    EXAM: Pleasant, alert, no distress  BP 97/67  Pulse 78  Resp 16  Ht 1.626 m (5' 4\")  Wt 58.5 kg (129 lb)  LMP 07/17/2013  SpO2 93%  BMI 22.14 kg/m2  HEENT: Normocephalic/atraumatic, pupils are equal, reactive to light, no scleral icterus  Oropharynx: Mallampati III, tonsillar stage 1  Neck supple no lymphadenopathy, neck circumference 32cm  Chest: Clear to auscultation bilaterally, no rales or wheezes  Cardiac: Regular rate and rhythm, S1-S2 normal  Abdomen: Soft and nontender, bowel sounds present  Extremities: Warm, well perfused, no cyanosis clubbing or edema  Psychiatric: Mood and affect appear normal  Neurologic: No gross focal deficits    STOP BANG 4 (3-4 intermediate risk ELEONORA)    ASSESSMENT: 57-year-old female with snoring witnessed apneas and daytime hypersomnolence despite adequate sleep opportunity.  Sleep maintenance insomnia may be psychophysiologic versus at least triggered by untreated apnea.  Low likelihood. but a possibility. that fluoxetine could be contributing to flushing.    PLAN: We discussed the pathophysiology of obstructive sleep apnea, however it may relate to insomnia, testing options including in lab testing and home sleep " testing, and treatment options including CPAP, oral appliances.  She is not a candidate for position restriction due to shoulder pain.  Also not a candidate for weight loss therapy as her body mass index is normal.  We did review hypoglossal nerve stimulator as well.  Please see patient instructions for further details of counseling provided.  Patient is interested in proceeding with home sleep apnea testing.  I will be contacting patient with results via [facility] when they become available.  Recommend she consider discussing going down by 1 capsule on the fluoxetine to see if it helps with her flushing symptoms.    Vibha Campos M.D.  Pulmonary/Critical Care/Sleep Medicine    The above note was dictated using voice recognition software and may include typographical errors. Please contact the author for any clarifications.

## 2018-06-06 NOTE — MR AVS SNAPSHOT
"              After Visit Summary   6/6/2018    Krish Renee    MRN: [id number]           Patient Information     Date Of Birth          1960        Visit Information        Provider Department      6/6/2018 8:00 AM Vibha Campos MD [facility], [place], Sleep Study        Today's Diagnoses     Snoring    -  1    Observed sleep apnea        Hypersomnolence          Care Instructions    MY TREATMENT INFORMATION FOR SLEEP APNEA-  Krish Fernandezvijay    DOCTOR : Vibha Campos  SLEEP CENTER :      MY CONTACT NUMBER:     Am I having a sleep study at a sleep center?  Make sure you have an appointment for the study before you leave!    Am I having a home sleep study?  Watch this video:  [email or web address]  Please verify your insurance coverage with your insurance carrier    Frequently asked questions:  1. What is Obstructive Sleep Apnea (ELEONORA)? ELEONORA is the most common type of sleep apnea. Apnea means, \"without breath.\"  Apnea is most often caused by narrowing or collapse of the upper airway as muscles relax during sleep.   Almost everyone has occasional apneas. Most people with sleep apnea have had brief interruptions at night frequently for many years.  The severity of sleep apnea is related to how frequent and severe the events are.   2. What are the consequences of ELEONORA? Symptoms include: feeling sleepy during the day, snoring loudly, gasping or stopping of breathing, trouble sleeping, and occasionally morning headaches or heartburn at night.  Sleepiness can be serious and even increase the risk of falling asleep while driving. Other health consequences may include development of high blood pressure and other cardiovascular disease in persons who are susceptible. Untreated ELEONORA  can contribute to heart disease, stroke and diabetes.   3. What are the treatment options? In most situations, sleep apnea is a lifelong disease that must be managed with " daily therapy. Medications are not effective for sleep apnea and surgery is generally not considered until other therapies have been tried. Your treatment is your choice . Continuous Positive Airway (CPAP) works right away and is the therapy that is effective in nearly everyone. An oral device to hold your jaw forward is usually the next most reliable option. Other options include postioning devices (to keep you off your back), weight loss, and surgery including a tongue pacing device. There is more detail about some of these options below.    Important tips for using CPAP and similar devices   Know your equipment:  CPAP is continuous positive airway pressure that prevents obstructive sleep apnea by keeping the throat from collapsing while you are sleeping. In most cases, the device is  smart  and can slowly self-adjusts if your throat collapses and keeps a record every day of how well you are treated-this information is available to you and your care team.  BPAP is bilevel positive airway pressure that keeps your throat open and also assists each breath with a pressure boost to maintain adequate breathing.  Special kinds of BPAP are used in patients who have inadequate breathing from lung or heart disease. In most cases, the device is  smart  and can slowly self-adjusts to assist breathing. Like CPAP, the device keeps a record of how well you are treated.  Your mask is your connection to the device. You get to choose what feels most comfortable and the staff will help to make sure if fits. Here: are some examples of the different masks that are available:       Key points to remember on your journey with sleep apnea:  1. Sleep study.  PAP devices often need to be adjusted during a sleep study to show that they are effective and adjusted right.  2. Good tips to remember: Try wearing just the mask during a quiet time during the day so your body adapts to wearing it. A humidifier is recommended for comfort in most  cases to prevent drying of your nose and throat. Allergy medication from your provider may help you if you are having nasal congestion.  3. Getting settled-in. It takes more than one night for most of us to get used to wearing a mask. Try wearing just the mask during a quiet time during the day so your body adapts to wearing it. A humidifier is recommended for comfort in most cases. Our team will work with you carefully on the first day and will be in contact within 4 days and again at 2 and 4 weeks for advice and remote device adjustments. Your therapy is evaluated by the device each day.   4. Use it every night. The more you are able to sleep naturally for 7-8 hours, the more likely you will have good sleep and to prevent health risks or symptoms from sleep apnea. Even if you use it 4 hours it helps. Occasionally all of us are unable to use a medical therapy, in sleep apnea, it is not dangerous to miss one night.   5. Communicate. Call our skilled team on the number provided on the first day if your visit for problems that make it difficult to wear the device. Over 2 out of 3 patients can learn to wear the device long-term with help from our team. Remember to call our team or your sleep providers if you are unable to wear the device as we may have other solutions for those who cannot adapt to mask CPAP therapy. It is recommended that you sleep your sleep provider within the first 3 months and yearly after that if you are not having problems.   Take care of your equipment. Make sure you clean your mask and tubing using directions every day and that your filter and mask are replaced as recommended or if they are not working.     BESIDES CPAP, WHAT OTHER THERAPIES ARE THERE?    Positioning Device  Positioning devices are generally used when sleep apnea is mild and only occurs on your back.This example shows a pillow that straps around the waist. It may be appropriate for those whose sleep study shows milder sleep  apnea that occurs primarily when lying flat on one's back. Preliminary studies have shown benefit but effectiveness at home may need to be verified by a home sleep test. These devices are generally not covered by medical insurance.  Examples of devices that maintain sleeping on the back to prevent snoring and mild sleep apnea.    Belt type body positioner  Http://[employer]/    Electronic reminder  Http://nightshifttherapy.com/  Http://[email or web address]/    Oral Appliance  What is oral appliance therapy?  An oral appliance device fits on your teeth at night like a retainer used after having braces. The device is made by a specialized dentist and requires several visits over 1-2 months before a manufactured device is made to fit your teeth and is adjusted to prevent your sleep apnea. Once an oral device is working properly, snoring should be improved. A home sleep test may be recommended at that time if to determine whether the sleep apnea is adequately treated.       Some things to remember:  -Oral devices are often, but not always, covered by your medical insurance. Be sure to check with your insurance provider.   -If you are referred for oral therapy, you will be given a list of specialized dentists to consider or you may choose to visit the Web site of the American Academy of Dental Sleep Medicine  -Oral devices are less likely to work if you have severe sleep apnea or are extremely overweight.     More detailed information  An oral appliance is a small acrylic device that fits over the upper and lower teeth  (similar to a retainer or a mouth guard). This device slightly moves jaw forward, which moves the base of the tongue forward, opens the airway, improves breathing for effective treat snoring and obstructive sleep apnea in perhaps 7 out of 10 people .  The best working devices are custom-made by a dental device  after a mold is made of the teeth 1, 2, 3.  When is an oral appliance  indicated?  Oral appliance therapy is recommended as a first-line treatment for patients with primary snoring, mild sleep apnea, and for patients with moderate sleep apnea who prefer appliance therapy to use of CPAP4, 5. Severity of sleep apnea is determined by sleep testing and is based on the number of respiratory events per hour of sleep.   How successful is oral appliance therapy?  The success rate of oral appliance therapy in patients with mild sleep apnea is 75-80% while in patients with moderate sleep apnea it is 50-70%. The chance of success in patients with severe sleep apnea is 40-50%. The research also shows that oral appliances have a beneficial effect on the cardiovascular health of ELEONORA patients at the same magnitude as CPAP therapy7.  Oral appliances should be a second-line treatment in cases of severe sleep apnea, but if not completely successful then a combination therapy utilizing CPAP plus oral appliance therapy may be effective. Oral appliances tend to be effective in a broad range of patients although studies show that the patients who have the highest success are females, younger patients, those with milder disease, and less severe obesity. 3, 6.   Finding a dentist that practices dental sleep medicine  Specific training is available through the American Academy of Dental Sleep Medicine for dentists interested in working in the field of sleep. To find a dentist who is educated in the field of sleep and the use of oral appliances, near you, visit the Web site of the American Academy of Dental Sleep Medicine.    References  1. Fredy et al. Objectively measured vs self-reported compliance during oral appliance therapy for sleep-disordered breathing. Chest 2013; 144(5): 6370-2634.  2. Keith, et al. Objective measurement of compliance during oral appliance therapy for sleep-disordered breathing. Thorax 2013; 68(1): 91-96.  3. Bobby et al. Mandibular advancement devices in 620 men and  women with ELEONORA and snoring: tolerability and predictors of treatment success. Chest 2004; 125: 1189-7701.  4. Olvier et al. Oral appliances for snoring and ELEONORA: a review. Sleep 2006; 29: 244-262.  5. Edwin et al. Oral appliance treatment for ELEONORA: an update. J Clin Sleep Med 2014; 10(2): 215-227.  6. Dave et al. Predictors of OSAH treatment outcome. J Dent Res 2007; 86: 3125-8433.      Weight Loss:    Weight loss is a long-term strategy that may improve sleep apnea in some patients.    Weight management is a personal decision and the decision should be based on your interest and the potential benefits.  If you are interested in exploring weight loss strategies, the following discussion covers the impact on weight loss on sleep apnea and the approaches that may be successful.    Being overweight does not necessarily mean you will have health consequences.  Those who have BMI over 35 or over 27 with existing medical conditions carries greater risk.   Weight loss decreases severity of sleep apnea in most people with obesity. For those with mild obesity who have developed snoring with weight gain, even 15-30 pound weight loss can improve and occasionally eliminate sleep apnea.  Structured and life-long dietary and health habits are necessary to lose weight and keep healthier weight levels.     Though there may be significant health benefits from weight loss, long-term weight loss is very difficult to achieve- studies show success with dietary management in less than 10% of people. In addition, substantial weight loss may require years of dietary control and may be difficult if patients have severe obesity. In these cases, surgical management may be considered.  Finally, older individuals who have tolerated obesity without health complications may be less likely to benefit from weight loss strategies.        Your BMI is Body mass index is 22.14 kg/(m^2).  Weight management is a personal decision.  If you are  interested in exploring weight loss strategies, the following discussion covers the approaches that may be successful. Body mass index (BMI) is one way to tell whether you are at a healthy weight, overweight, or obese. It measures your weight in relation to your height.  A BMI of 18.5 to 24.9 is in the healthy range. A person with a BMI of 25 to 29.9 is considered overweight, and someone with a BMI of 30 or greater is considered obese. More than two-thirds of American adults are considered overweight or obese.  Being overweight or obese increases the risk for further weight gain. Excess weight may lead to heart disease and diabetes.  Creating and following plans for healthy eating and physical activity may help you improve your health.  Weight control is part of healthy lifestyle and includes exercise, emotional health, and healthy eating habits. Careful eating habits lifelong are the mainstay of weight control. Though there are significant health benefits from weight loss, long-term weight loss with diet alone may be very difficult to achieve- studies show long-term success with dietary management in less than 10% of people. Attaining a healthy weight may be especially difficult to achieve in those with severe obesity. In some cases, medications, devices and surgical management might be considered.  What can you do?  If you are overweight or obese and are interested in methods for weight loss, you should discuss this with your provider.     Consider reducing daily calorie intake by 500 calories.     Keep a food journal.     Avoiding skipping meals, consider cutting portions instead.    Diet combined with exercise helps maintain muscle while optimizing fat loss. Strength training is particularly important for building and maintaining muscle mass. Exercise helps reduce stress, increase energy, and improves fitness. Increasing exercise without diet control, however, may not burn enough calories to loose weight.        Start walking three days a week 10-20 minutes at a time    Work towards walking thirty minutes five days a week     Eventually, increase the speed of your walking for 1-2 minutes at time    In addition, we recommend that you review healthy lifestyles and methods for weight loss available through the National Institutes of Health patient information sites:  http://win.niddk.nih.gov/publications/index.htm    And look into health and wellness programs that may be available through your health insurance provider, employer, local community center, or lana club.    Weight management plan: Keep walking   Dental appliance resources recommended by [facility]        [facility]   Sleep Medicine [facility]   Alden Colón DDS, MS     [address]  [place] [address]   Appointments: [phone] Ext: [phone]  Fax: [phone]   [email or web address]   Dental and Oral Surgery Clinic   Cory Wilson DDS   [address]   [place] [address]   Appointments: [phone]   [email or web address]   [email or web address]       Snoring and Sleep Apnea   Dental Treatment Center   Jerry Albright DDS   [address]   [address]   [place] [address]   Appointments: [phone]   Fax: [phone]   [facility] [employer] [facility]  Steven Trent DDS- takes medicare  [address], [place] [address]  [address], [place] [address] [phone]; [phone] (fax)  [employer]    [facility]  Jose A Joiner DDS  [facility]  [address]  [phone]  [place] [address]  [facility]   [facility]   Cory Wilson DDS   [employer]   [address],   [address]   [place] [address]   Appointments:  [phone]   Fax: [phone]     [place] Office   Albaro Spears DDS, MS   [DME Medicare]  [facility]   [address]   [address]   [place] [address]   Appointments: [phone]   Fax: [phone]   Dr. Spears accepts Medicaid patients.     [place] Office  Ene Hernández BDS, MS  [address].  [address]  [place] [address]  Appointments: [phone]  Fax: [phone]    [facility]  Keon Adams DEBRA  [DME Medicare]  [address]  [place] [address]  Appointments [phone]  Fax: [phone]    +[facility]   [facility]     Hawa Woods DDS, PhD, [facility]   [facility]   [address],   [address]   [place] [address]   Appointments: [phone]   Fax: [phone]     [place] Office   [facility]   [address]   [address]   [place] [address]   Appointments: [phone]   Fax: [phone]     [facility]  Stephanie Wiley DDS  [facility] [address] [address]  [phone] (Office)   [phone] (Fax    If you wish to choose your own dental sleep dentist, you may identify a provider close to you: [email or web address]      Surgery:    Surgery for obstructive sleep apnea is considered generally only when other therapies fail to work. Surgery may be discussed with you if you are having a difficult time tolerating CPAP and or when there is an abnormal structure that requires surgical correction.  Nose and throat surgeries often enlarge the airway to prevent collapse.  Most of these surgeries create pain for 1-2 weeks and up to half of the most common surgeries are not effective throughout life.  You should carefully discuss the benefits and drawbacks to surgery with your sleep provider and surgeon to determine if it is the best solution for you.   More  information  Surgery for ELEONORA is directed at areas that are responsible for narrowing or complete obstruction of the airway during sleep.  There are a wide range of procedures available to enlarge and/or stabilize the airway to prevent blockage of breathing in the three major areas where it can occur: the palate, tongue, and nasal regions.  Successful surgical treatment depends on the accurate identification of the factors responsible for obstructive sleep apnea in each person.  A personalized approach is required because there is no single treatment that works well for everyone.  Because of anatomic variation, consultation with an examination by a sleep surgeon is a critical first step in determining what surgical options are best for each patient.  In some cases, examination during sedation may be recommended in order to guide the selection of procedures.  Patients will be counseled about risks and benefits as well as the typical recovery course after surgery. Surgery is typically not a cure for a person s ELEONORA.  However, surgery will often significantly improve one s ELEONORA severity (termed  success rate ).  Even in the absence of a cure, surgery will decrease the cardiovascular risk associated with OSA7; improve overall quality of life8 (sleepiness, functionality, sleep quality, etc).      Palate Procedures:  Patients with ELEONORA often have narrowing of their airway in the region of their tonsils and uvula.  The goals of palate procedures are to widen the airway in this region as well as to help the tissues resist collapse.  Modern palate procedure techniques focus on tissue conservation and soft tissue rearrangement, rather than tissue removal.  Often the uvula is preserved in this procedure. Residual sleep apnea is common in patient after pharyngoplasty with an average reduction in sleep apnea events of 33%2.      Tongue Procedures:  ExamWhile patients are awake, the muscles that surround the throat are active and  keep this region open for breathing. These muscles relax during sleep, allowing the tongue and other structures to collapse and block breathing.  There are several different tongue procedures available.  Selection of a tongue base procedure depends on characteristics seen on physical exam.  Generally, procedures are aimed at removing bulky tissues in this area or preventing the back of the tongue from falling back during sleep.  Success rates for tongue surgery range from 50-62%3.    Hypoglossal Nerve Stimulation:  Hypoglossal nerve stimulation has recently received approval from the [place] Food and Drug Administration for the treatment of obstructive sleep apnea.  This is based on research showing that the system was safe and effective in treating sleep apnea6.  Results showed that the median AHI score decreased 68%, from 29.3 to 9.0. This therapy uses an implant system that senses breathing patterns and delivers mild stimulation to airway muscles, which keeps the airway open during sleep.  The system consists of three fully implanted components: a small generator (similar in size to a pacemaker), a breathing sensor, and a stimulation lead.  Using a small handheld remote, a patient turns the therapy on before bed and off upon awakening.    Candidates for this device must be greater than 22 years of age, have moderate to severe ELEONORA (AHI between 20-65), BMI less than 32, have tried CPAP/oral appliance without success, and have appropriate upper airway anatomy (determined by a sleep endoscopy performed by Dr. Quintero).    Hypoglossal Nerve Stimulation Pathway:    The sleep surgeon s office will work with the patient through the insurance prior-authorization process (including communications and appeals).    Nasal Procedures:  Nasal obstruction can interfere with nasal breathing during the day and night.  Studies have shown that relief of nasal obstruction can improve the ability of some patients to tolerate  positive airway pressure therapy for obstructive sleep apnea1.  Treatment options include medications such as nasal saline, topical corticosteroid and antihistamine sprays, and oral medications such as antihistamines or decongestants. Non-surgical treatments can include external nasal dilators for selected patients. If these are not successful by themselves, surgery can improve the nasal airway either alone or in combination with these other options.      Combination Procedures:  Combination of surgical procedures and other treatments may be recommended, particularly if patients have more than one area of narrowing or persistent positional disease.  The success rate of combination surgery ranges from 66-80%2,3.    References  1. Wilson SHEA. The Role of the Nose in Snoring and Obstructive Sleep Apnoea: An Update.  Eur Arch Otorhinolaryngol. 2011; 268: 1365-73.  2.  Cindy SM; Mariaa JA; Zenia JR; Pallanch JF; Teddy MB; Charanjit SG; Bandar HERNÁNDEZ. Surgical modifications of the upper airway for obstructive sleep apnea in adults: a systematic review and meta-analysis. SLEEP 2010;33(10):3671-0954. Arjun DREW. Hypopharyngeal surgery in obstructive sleep apnea: an evidence-based medicine review.  Arch Otolaryngol Head Neck Surg. 2006 Feb;132(2):206-13.  3. Eric YH1, Omega Y, Frankie ANDREA. The efficacy of anatomically based multilevel surgery for obstructive sleep apnea. Otolaryngol Head Neck Surg. 2003 Oct;129(4):327-35.  4. Arjun DREW, Goldberg A. Hypopharyngeal Surgery in Obstructive Sleep Apnea: An Evidence-Based Medicine Review. Arch Otolaryngol Head Neck Surg. 2006 Feb;132(2):206-13.  5. Nito PJ et al. Upper-Airway Stimulation for Obstructive Sleep Apnea.  N Engl J Med. 2014 Jan 9;370(2):139-49.  6. Michelle Y et al. Increased Incidence of Cardiovascular Disease in Middle-aged Men with Obstructive Sleep Apnea. Am J Respir Crit Care Med; 2002 166: 159-165  7. Rosendo BURT et al. Studying Life Effects and Effectiveness of  Palatopharyngoplasty (SLEEP) study: Subjective Outcomes of Isolated Uvulopalatopharyngoplasty. Otolaryngol Head Neck Surg. 2011; 144: 623-631.                    Follow-ups after your visit        Your next 10 appointments already scheduled     Jun 13, 2018  2:00 PM CDT   HST  with SLEEP STUDY RM 7   [facility] ([facility])    [address] [place] [id number] [phone]            Jun 14, 2018  8:00 AM CDT   HST Drop Off with DME SCHEDULE   [facility] [place], Sleep Study ([facility])    [address] [place] [id number] [phone]            Jun 19, 2018  8:00 AM CDT   Screening Mammogram with UCBCMA1   [facility] ([facility])    [address] [facility]  2nd Floor  [facility] [address] [phone]           Do NOT use body powder, lotions, perfume or deodorant the day of the exam.  If your last mammogram was not done at [place], please bring your mammogram films. We will need the name of your provider to send a copy of your report.  A mammogram may be covered on an annual or biannual basis, please check with your insurance company.              Future tests that were ordered for you today     Open Future Orders        Priority Expected Expires Ordered    HST-Home Sleep Apnea Test Routine  12/6/2018 6/6/2018            Who to contact     If you have questions or need follow up information about today's clinic visit or your schedule please contact [facility] directly at [phone].  Normal or non-critical lab and imaging results will be communicated to you by MyChart, letter or phone within 4 business days after the clinic has received the results. If you do not hear from us within 7 days, please contact the clinic through MyChart or phone. If you have a critical or abnormal lab result, we will notify you by phone  "as soon as possible.  Submit refill requests through [employer] or call your pharmacy and they will forward the refill request to us. Please allow 3 business days for your refill to be completed.          Additional Information About Your Visit        Soma Water[employer] Information     [employer] gives you secure access to your electronic health record. If you see a primary care provider, you can also send messages to your care team and make appointments. If you have questions, please call your primary care clinic.  If you do not have a primary care provider, please call [phone] and they will assist you.        Care EveryWhere ID     This is your Care EveryWhere ID. This could be used by other organizations to access your [place] medical records  [id number]        Your Vitals Were     Pulse Respirations Height Last Period Pulse Oximetry BMI (Body Mass Index)    78 16 1.626 m (5' 4\") 07/17/2013 93% 22.14 kg/m2       Blood Pressure from Last 3 Encounters:   06/06/18 97/67   05/15/18 102/70   12/27/17 120/74    Weight from Last 3 Encounters:   06/06/18 58.5 kg (129 lb)   05/15/18 58.9 kg (129 lb 14.4 oz)   12/27/17 57.4 kg (126 lb 9.6 oz)               Primary Care Provider Office Phone # Fax #    Ariane Abdulaziz Varela [phone]       [facility] [address]  [facility] [address]        Equal Access to Services     [facility] AH: Hadii aad ku hadasho Soomaali, waaxda luqadaha, qaybta kaalmada adeegyada, laly macario. So [facility] [phone].    ATENCIÓN: Si habla español, tiene a gardner disposición servicios gratuitos de asistencia lingüística. Llame al [phone].    We comply with applicable federal civil rights laws and [place] laws. We do not discriminate on the basis of race, color, national origin, age, disability, sex, sexual orientation, or gender identity.            Thank you!     Thank you for choosing [facility], SLEEP STUDY  for your care. Our goal is " always to provide you with excellent care. Hearing back from our patients is one way we can continue to improve our services. Please take a few minutes to complete the written survey that you may receive in the mail after your visit with us. Thank you!             Your Updated Medication List - Protect others around you: Learn how to safely use, store and throw away your medicines at www.disposemymeds.org.          This list is accurate as of 6/6/18  8:50 AM.  Always use your most recent med list.                   Brand Name Dispense Instructions for use Diagnosis    aspirin 81 MG EC tablet     90 tablet    Take 1 tablet (81 mg) by mouth daily    Type 2 diabetes mellitus without complication (H)       atorvastatin 40 MG tablet    LIPITOR    30 tablet    Take 1 tablet (40 mg) by mouth daily    Hypercholesteremia       blood glucose monitoring lancets     4 Box    Use to test blood sugars 4 times daily or as directed.    Diabetes mellitus, type 2 (H)       blood glucose monitoring test strip    FREESTYLE LITE    4 Box    Use to test blood sugars 4 times daily or as directed.    Diabetes mellitus, type 2 (H)       FLUoxetine 20 MG capsule    PROzac    21 capsule    Take 3 capsules (60 mg) by mouth daily    Depression, unspecified depression type       insulin glargine 100 UNIT/ML injection    LANTUS SOLOSTAR    15 mL    Inject 10 Units Subcutaneous every morning    Type 2 diabetes mellitus with insulin deficiency (H)       insulin pen needle 31G X 6 MM     100 each    Use 1 X daily or as directed.    Diabetes mellitus (H)       metFORMIN 500 MG 24 hr tablet    GLUCOPHAGE-XR    120 tablet    Take 4 tablets (2,000 mg) by mouth daily *MUST BE SEEN FOR FURTHER REFILLS    Type 2 diabetes mellitus with complication, unspecified long term insulin use status (H)

## 2018-06-13 ENCOUNTER — OFFICE VISIT (OUTPATIENT)
Dept: SLEEP MEDICINE | Facility: CLINIC | Age: 58
End: 2018-06-13
Payer: COMMERCIAL

## 2018-06-13 DIAGNOSIS — R06.83 SNORING: ICD-10-CM

## 2018-06-13 DIAGNOSIS — G47.30 OBSERVED SLEEP APNEA: ICD-10-CM

## 2018-06-13 DIAGNOSIS — G47.10 HYPERSOMNOLENCE: ICD-10-CM

## 2018-06-13 PROCEDURE — G0399 HOME SLEEP TEST/TYPE 3 PORTA: HCPCS | Performed by: INTERNAL MEDICINE

## 2018-06-13 NOTE — MR AVS SNAPSHOT
After Visit Summary   6/13/2018    Krish Renee    MRN: [id number]           Patient Information     Date Of Birth          1960        Visit Information        Provider Department      6/13/2018 2:00 PM SLEEP STUDY RM 7 [facility] [place], Sleep Study        Today's Diagnoses     Snoring        Observed sleep apnea        Hypersomnolence          Care Instructions    My home sleep study:    ______I will activate the device as shown on the video    __X____My device is programmed to start automatically at     ____10:00 pm__________ Time   on ____06/13/2018__________  Day/Date    My contact number if I have problems is _____[phone]______________________________      I will watch the video before I hook it up at night: [email or web address]    In case of an emergency call [phone]                    Follow-ups after your visit        Your next 10 appointments already scheduled     Jun 14, 2018  8:00 AM CDT   HST Drop Off with DME SCHEDULE   [facility])    [address] [place] [address] [phone]            Jun 19, 2018  8:00 AM CDT   Screening Mammogram with [id number] [facility] ([facility])    [address] [facility]  2nd [facility] [address] [phone]           Do NOT use body powder, lotions, perfume or deodorant the day of the exam.  If your last mammogram was not done at [place], please bring your mammogram films. We will need the name of your provider to send a copy of your report.  A mammogram may be covered on an annual or biannual basis, please check with your insurance company.              Future tests that were ordered for you today     Open Future Orders        Priority Expected Expires Ordered    MA Screening Digital Bilateral Routine  6/12/2019 6/12/2018            Who to contact     If you have questions or need follow up  information about today's clinic visit or your schedule please contact [facility] directly at [phone].  Normal or non-critical lab and imaging results will be communicated to you by MyChart, letter or phone within 4 business days after the clinic has received the results. If you do not hear from us within 7 days, please contact the clinic through [employer] or phone. If you have a critical or abnormal lab result, we will notify you by phone as soon as possible.  Submit refill requests through [employer] or call your pharmacy and they will forward the refill request to us. Please allow 3 business days for your refill to be completed.          Additional Information About Your Visit        [employer] Information     [employer] gives you secure access to your electronic health record. If you see a primary care provider, you can also send messages to your care team and make appointments. If you have questions, please call your primary care clinic.  If you do not have a primary care provider, please call [phone] and they will assist you.        Care EveryWhere ID     This is your Care EveryWhere ID. This could be used by other organizations to access your [place] medical records  [id number]        Your Vitals Were     Last Period                   07/17/2013            Blood Pressure from Last 3 Encounters:   06/06/18 97/67   05/15/18 102/70   12/27/17 120/74    Weight from Last 3 Encounters:   06/06/18 58.5 kg (129 lb)   05/15/18 58.9 kg (129 lb 14.4 oz)   12/27/17 57.4 kg (126 lb 9.6 oz)              We Performed the Following     HST-Home Sleep Apnea Test        Primary Care Provider Office Phone # Fax #    Ariane Abdulaziz Varela [phone]       [facility] [address] [facility] [address]        Equal Access to Services     MARYA ELKINS : Balaji Golden, ángel mahoney, laly hanson. So [facility]  [phone].    ATENCIÓN: Si clayton bell, tiene a gardner disposición servicios gratuitos de asistencia lingüística. Jamel cook [phone].    We comply with applicable federal civil rights laws and [place] laws. We do not discriminate on the basis of race, color, national origin, age, disability, sex, sexual orientation, or gender identity.            Thank you!     Thank you for choosing [facility] [place], SLEEP STUDY  for your care. Our goal is always to provide you with excellent care. Hearing back from our patients is one way we can continue to improve our services. Please take a few minutes to complete the written survey that you may receive in the mail after your visit with us. Thank you!             Your Updated Medication List - Protect others around you: Learn how to safely use, store and throw away your medicines at www.disposemymeds.org.          This list is accurate as of 6/13/18  2:03 PM.  Always use your most recent med list.                   Brand Name Dispense Instructions for use Diagnosis    aspirin 81 MG EC tablet     90 tablet    Take 1 tablet (81 mg) by mouth daily    Type 2 diabetes mellitus without complication (H)       atorvastatin 40 MG tablet    LIPITOR    30 tablet    Take 1 tablet (40 mg) by mouth daily    Hypercholesteremia       blood glucose monitoring lancets     4 Box    Use to test blood sugars 4 times daily or as directed.    Diabetes mellitus, type 2 (H)       blood glucose monitoring test strip    FREESTYLE LITE    4 Box    Use to test blood sugars 4 times daily or as directed.    Diabetes mellitus, type 2 (H)       FLUoxetine 20 MG capsule    PROzac    21 capsule    Take 3 capsules (60 mg) by mouth daily    Depression, unspecified depression type       insulin glargine 100 UNIT/ML injection    LANTUS SOLOSTAR    15 mL    Inject 10 Units Subcutaneous every morning    Type 2 diabetes mellitus with insulin deficiency (H)       insulin pen needle 31G X 6 MM     100 each    Use 1 X  daily or as directed.    Diabetes mellitus (H)       metFORMIN 500 MG 24 hr tablet    GLUCOPHAGE-XR    120 tablet    Take 4 tablets (2,000 mg) by mouth daily *MUST BE SEEN FOR FURTHER REFILLS    Type 2 diabetes mellitus with complication, unspecified long term insulin use status (H)

## 2018-06-13 NOTE — PATIENT INSTRUCTIONS
My home sleep study:    ______I will activate the device as shown on the video    __X____My device is programmed to start automatically at     ____10:00 pm__________ Time   on ____06/13/2018__________  Day/Date    My contact number if I have problems is _____[phone]______________________________      I will watch the video before I hook it up at night: [email or web address]    In case of an emergency call [phone]

## 2018-06-13 NOTE — PROGRESS NOTES
"Patient presented to clinic for  and demonstration of the \"HST Device\". Patient was set up and instructed use. Patient verbalized understanding and will be returning device after 10 am.       Patient was given HST sleep logs and written instructions for use.        LEN Swenson                      "

## 2018-06-14 ENCOUNTER — DOCUMENTATION ONLY (OUTPATIENT)
Dept: SLEEP MEDICINE | Facility: CLINIC | Age: 58
End: 2018-06-14
Payer: COMMERCIAL

## 2018-06-14 ENCOUNTER — MYC MEDICAL ADVICE (OUTPATIENT)
Dept: SLEEP MEDICINE | Facility: CLINIC | Age: 58
End: 2018-06-14

## 2018-06-14 DIAGNOSIS — G47.33 OSA (OBSTRUCTIVE SLEEP APNEA): ICD-10-CM

## 2018-06-14 DIAGNOSIS — G47.33 OSA (OBSTRUCTIVE SLEEP APNEA): Primary | ICD-10-CM

## 2018-06-14 NOTE — PROCEDURES
"HOME SLEEP STUDY INTERPRETATION    Patient: Krish Renee  MRN: [id number]  YOB: 1960  Study Date: 6/13/2018  Referring Provider: Ariane Varela MD  Ordering Provider: Vibha Campos MD     Indications for Home Study: Krish Renee is a 57 year old female with a history of depression and diabetes who presents with symptoms suggestive of obstructive sleep apnea.    Estimated body mass index is 22.14 kg/(m^2) as calculated from the following:    Height as of 6/6/18: 1.626 m (5' 4\").    Weight as of 6/6/18: 58.5 kg (129 lb).  Total score - [place]: 11 (6/6/2018  7:00 AM)  StopBang Total Score: 4 (6/14/2018 11:00 AM)    Data: A full night home sleep study was performed recording the standard physiologic parameters including body position, movement, sound, nasal pressure, thermal oral airflow, chest and abdominal movements with respiratory inductance plethysmography, and oxygen saturation by pulse oximetry. Pulse rate was estimated by oximetry recording. This study was considered adequate based on > 4 hours of quality oximetry and respiratory recording. As specified by the AASM Manual for the Scoring of Sleep and Associated events, version 2.3, Rule VIII.D 1B, 4% oxygen desaturation scoring for hypopneas is used as a standard of care on all home sleep apnea testing.    Analysis Time:  477 minutes    Respiration:   Sleep Associated Hypoxemia: sustained hypoxemia was not present. Baseline oxygen saturation was 93%.  Time with saturation less than or equal to 88% was 0 minutes. The lowest oxygen saturation was 86%.   Snoring: Snoring was present, intermittent.  Respiratory events: The home study revealed a presence of 4 obstructive apneas and 7 mixed and central apneas. There were 41 hypopneas resulting in a combined apnea/hypopnea index [AHI] of 6.5 events per hour.  AHI was 8.1 per hour supine, 2.7 per hour on left side.   Pattern: Excluding events noted above, respiratory rate and pattern " was Normal.    Position: Percent of time spent: supine - 71%, prone - 0%, on left - 29%, on right - 0%.    Heart Rate: By pulse oximetry normal rate was noted.     Assessment:   Mild obstructive sleep apnea.  Home sleep apnea testing may be less sensitive in low AHI range, underestimating severity of sleep disordered breathing.  Sleep associated hypoxemia was not present.    Recommendations:  Consider auto-CPAP at 5-15 cmH2O, oral appliance therapy or positional therapy.  Suggest optimizing sleep hygiene and avoiding sleep deprivation.  Weight management.    Diagnosis Code(s): Obstructive Sleep Apnea G47.33    Vibha Campos MD, June 14, 2018   Diplomate, American Board of Internal Medicine, Sleep Medicine

## 2018-06-19 DIAGNOSIS — Z12.31 VISIT FOR SCREENING MAMMOGRAM: ICD-10-CM

## 2018-06-20 PROBLEM — G47.33 OSA (OBSTRUCTIVE SLEEP APNEA): Status: ACTIVE | Noted: 2018-06-20

## 2018-06-20 NOTE — TELEPHONE ENCOUNTER
"Bharti Diane CMA 6/20/2018 11:58 AM CDT    Patient would like to proceed with Dental referral. Once order is entered Carla will send to patient to review dentist list and verify with her insurance.     LEN England  Sleep Clinic-Specialist,    Registered Medical Assistant   [facility]    ----- Message -----   From: Josiane Rizvi, RN   Sent: 6/20/2018 9:21 AM   To: Bharti Diane CMA  Subject: FW:Home Sleep Apnea Test Results     Hi SwatiFarshad told me you are back - welcome back!! Congrats!!      ----- Message -----   From: Krish Renee   Sent: 6/19/2018 2:04 PM   To: [facility]  Subject: RE:Home Sleep Apnea Test Results     I am interested in the oral appliance therapy. Is there a prescription that I receive to take to my dentist?  ----- Message -----  From: Vibha Campos MD  Sent: 6/14/2018 1:05 PM CDT  To: Krish Renee  Subject: Home Sleep Apnea Test Results    Hello,     Home sleep apnea testing shows overall mild sleep apnea -mostly on your back. (See below.)  As discussed in clinic, treatment options include using devices to keep you off you back when you sleep.  Oral appliance made my sleep dentist.  CPAP    Are you interested in a treatment trial? How would you like to proceed?    Vibha Campos M.D.  Pulmonary/Critical Care/Sleep Medicine      HOME SLEEP STUDY INTERPRETATION     Patient: Krish Renee  MRN: [id number]  YOB: 1960  Study Date: 6/13/2018  Referring Provider: Ariane Varela MD  Ordering Provider: Vibha Campos MD      Indications for Home Study: Krish Renee is a 57 year old female with a history of depression and diabetes who presents with symptoms suggestive of obstructive sleep apnea.     Estimated body mass index is 22.14 kg/(m^2) as calculated from the following:   Height as of 6/6/18: 1.626 m (5' 4\").   Weight as of 6/6/18: 58.5 kg (129 lb).  Total score - [place]: 11 (6/6/2018 7:00 " AM)  StopBang Total Score: 4 (6/14/2018 11:00 AM)     Data: A full night home sleep study was performed recording the standard physiologic parameters including body position, movement, sound, nasal pressure, thermal oral airflow, chest and abdominal movements with respiratory inductance plethysmography, and oxygen saturation by pulse oximetry. Pulse rate was estimated by oximetry recording. This study was considered adequate based on > 4 hours of quality oximetry and respiratory recording. As specified by the AASM Manual for the Scoring of Sleep and Associated events, version 2.3, Rule VIII.D 1B, 4% oxygen desaturation scoring for hypopneas is used as a standard of care on all home sleep apnea testing.     Analysis Time: 477 minutes     Respiration:   Sleep Associated Hypoxemia: sustained hypoxemia was not present. Baseline oxygen saturation was 93%. Time with saturation less than or equal to 88% was 0 minutes. The lowest oxygen saturation was 86%.   Snoring: Snoring was present, intermittent.  Respiratory events: The home study revealed a presence of 4 obstructive apneas and 7 mixed and central apneas. There were 41 hypopneas resulting in a combined apnea/hypopnea index [AHI] of 6.5 events per hour. AHI was 8.1 per hour supine, 2.7 per hour on left side.   Pattern: Excluding events noted above, respiratory rate and pattern was Normal.     Position: Percent of time spent: supine - 71%, prone - 0%, on left - 29%, on right - 0%.     Heart Rate: By pulse oximetry normal rate was noted.      Assessment:   Mild obstructive sleep apnea.  Home sleep apnea testing may be less sensitive in low AHI range, underestimating severity of sleep disordered breathing.  Sleep associated hypoxemia was not present.     Recommendations:  Consider auto-CPAP at 5-15 cmH2O, oral appliance therapy or positional therapy.  Suggest optimizing sleep hygiene and avoiding sleep deprivation.  Weight management.     Diagnosis Code(s): Obstructive  Sleep Apnea G47.33     Vibha Campos MD, June 14, 2018   Diplomate, American Board of Internal Medicine, Sleep Medicine       Electronically signed by Vibha Campos MD at 6/14/2018  1:01 PM

## 2018-06-21 ENCOUNTER — DOCUMENTATION ONLY (OUTPATIENT)
Dept: SLEEP MEDICINE | Facility: CLINIC | Age: 58
End: 2018-06-21

## 2018-06-21 NOTE — PROGRESS NOTES
Dental referral sent on Thursday, June 21  to [place] [facility] ([address]) to have Dental  to review and start process of referral then contact pt with scheduling.  Carla Qaun,

## 2018-07-18 ENCOUNTER — OFFICE VISIT (OUTPATIENT)
Dept: DENTISTRY | Facility: CLINIC | Age: 58
End: 2018-07-18

## 2018-07-18 DIAGNOSIS — G47.33 OSA (OBSTRUCTIVE SLEEP APNEA): Primary | ICD-10-CM

## 2018-07-18 NOTE — LETTER
7/18/2018       RE: Krish Renee  [address]  [facility] [address]     Dear Colleague,    Thank you for referring your patient, Krish Renee, to the [facility] at [facility]. Please see a copy of my visit note below.    S:   - Sleep physician LEONARDO Campos   - pt in no acute distress  - pt has mild sleep apnea  - some tiredness, fatigue, snoring  - no jaw pain, jaw discomfort, or jaw related headaches currently  - no functional problems (eating, chewing etc.)  - no bite problems  - no ear problems  - stress OK, pt works for [employer]   - pt has dentist, last appointment wo problem, no acute dental issues  - No family of arthritis or CA relevant for ELEONORA    O:  - Opening: not limited, no pain, passive stretch ok  - Protrusion: not limited, no pain or discomfort, pt. can stay in max protrusion without discomfort  - Teeth ok  - No M. Mass. + Temp palpation pain or discomfort  - No TMJ palpation pain  - No neck palpation pain  - No joint clicking  - V and VII are grossly intact  - lips ok, salivary glands ok, oral mucosa ok    A:  - Obstructive sleep apnea    P:  - Explained appliance therapy with models  - Side effect explained: TMD problems and bite changes and what can be done to prevent problems  - Pt understood risk and was comfortable with the risks  - Explained procedure (impressions, bite, splint delivery, and titration)  Bite registration and impressions, insert in 4 weeks     Again, thank you for allowing me to participate in the care of your patient.      Sincerely,    Alden Colón DDS

## 2018-08-16 DIAGNOSIS — E11.8 TYPE 2 DIABETES MELLITUS WITH COMPLICATION, UNSPECIFIED LONG TERM INSULIN USE STATUS: ICD-10-CM

## 2018-08-17 RX ORDER — METFORMIN HCL 500 MG
2000 TABLET, EXTENDED RELEASE 24 HR ORAL DAILY
Qty: 360 TABLET | Refills: 1 | Status: SHIPPED | OUTPATIENT
Start: 2018-08-17 | End: 2019-01-20

## 2018-08-20 DIAGNOSIS — E11.9 DIABETES MELLITUS (H): ICD-10-CM

## 2018-08-20 DIAGNOSIS — E78.00 HYPERCHOLESTEREMIA: ICD-10-CM

## 2018-08-20 RX ORDER — ATORVASTATIN CALCIUM 40 MG/1
40 TABLET, FILM COATED ORAL DAILY
Qty: 90 TABLET | Refills: 2 | Status: SHIPPED | OUTPATIENT
Start: 2018-08-20 | End: 2019-04-23

## 2018-08-22 RX ORDER — FLURBIPROFEN SODIUM 0.3 MG/ML
SOLUTION/ DROPS OPHTHALMIC
Qty: 90 EACH | Refills: 3 | Status: SHIPPED | OUTPATIENT
Start: 2018-08-22 | End: 2022-07-18

## 2018-08-29 ENCOUNTER — OFFICE VISIT (OUTPATIENT)
Dept: DENTISTRY | Facility: CLINIC | Age: 58
End: 2018-08-29

## 2018-08-29 DIAGNOSIS — G47.33 OSA (OBSTRUCTIVE SLEEP APNEA): Primary | ICD-10-CM

## 2018-08-29 NOTE — PROGRESS NOTES
S:   - Sleep physician LEONARDO Campos   - pt in no acute distress  - pt has mild sleep apnea  - some tiredness, fatigue, snoring  - no jaw pain, jaw discomfort, or jaw related headaches currently  - no functional problems (eating, chewing etc.)  - no bite problems  - no ear problems  - stress OK, pt works for [employer]   - pt has dentist, last appointment wo problem, no acute dental issues  - No family of arthritis or CA relevant for ELEONORA    O:  - Opening and protrusion: not limited, no pain  - Teeth ok  - No M. Mass. + Temp palpation pain or discomfort  - No TMJ palpation pain  - No neck palpation pain  - No joint clicking  - V and VII are grossly intact  - lips ok, salivary glands ok, oral mucosa ok  - Splint inserted  - Good fit, occlusion checked, pt can stay in protrusive position without discomfort  - Pt has no problems to insert appliance and take it out   - Splint care and maintenance explained  - Morning exercises explained and demonstrated  - Pt was advised to read instructions   - Pt is a diabetic and felt a glucose low  - Gave pt two glucose tablets (5 g each) and patient felt better  - Advised patient to buy and eat cereal bar in the pharmacy for carb intake    A:  - Obstructive sleep apnea    P:  - FU in 2 weeks for adjustment  - If problems occur, pt should stop wearing the splint and should call

## 2018-08-29 NOTE — LETTER
8/29/2018       RE: Krish Renee  [address]  [facility] [address]     Dear Colleague,    Thank you for referring your patient, Krish Renee, to the [facility] DENTAL CLINIC at [facility]. Please see a copy of my visit note below.    S:   - Sleep physician LEONARDO Campos   - pt in no acute distress  - pt has mild sleep apnea  - some tiredness, fatigue, snoring  - no jaw pain, jaw discomfort, or jaw related headaches currently  - no functional problems (eating, chewing etc.)  - no bite problems  - no ear problems  - stress OK, pt works for [employer]   - pt has dentist, last appointment wo problem, no acute dental issues  - No family of arthritis or CA relevant for ELEONORA    O:  - Opening and protrusion: not limited, no pain  - Teeth ok  - No M. Mass. + Temp palpation pain or discomfort  - No TMJ palpation pain  - No neck palpation pain  - No joint clicking  - V and VII are grossly intact  - lips ok, salivary glands ok, oral mucosa ok  - Splint inserted  - Good fit, occlusion checked, pt can stay in protrusive position without discomfort  - Pt has no problems to insert appliance and take it out   - Splint care and maintenance explained  - Morning exercises explained and demonstrated  - Pt was advised to read instructions   - Pt is a diabetic and felt a glucose low  - Gave pt two glucose tablets (5 g each) and patient felt better  - Advised patient to buy and eat cereal bar in the pharmacy for carb intake    A:  - Obstructive sleep apnea    P:  - FU in 2 weeks for adjustment  If problems occur, pt should stop wearing the splint and should call    Again, thank you for allowing me to participate in the care of your patient.      Sincerely,    Alden Colón DDS

## 2018-09-14 ENCOUNTER — OFFICE VISIT (OUTPATIENT)
Dept: DERMATOLOGY | Facility: CLINIC | Age: 58
End: 2018-09-14
Payer: COMMERCIAL

## 2018-09-14 DIAGNOSIS — D48.5 NEOPLASM OF UNCERTAIN BEHAVIOR OF SKIN: Primary | ICD-10-CM

## 2018-09-14 DIAGNOSIS — L72.0 MILIA: ICD-10-CM

## 2018-09-14 DIAGNOSIS — D18.01 CHERRY ANGIOMA: ICD-10-CM

## 2018-09-14 DIAGNOSIS — L81.4 LENTIGINES: ICD-10-CM

## 2018-09-14 DIAGNOSIS — L81.2 FRECKLED SKIN: ICD-10-CM

## 2018-09-14 ASSESSMENT — PAIN SCALES - GENERAL: PAINLEVEL: NO PAIN (0)

## 2018-09-14 NOTE — NURSING NOTE
Dermatology Rooming Note    Krish Renee's goals for this visit include:   Chief Complaint   Patient presents with     Skin Check     Krish is here today for a skin check- has some areas of concern on her face.      Giovanna Lassiter MA

## 2018-09-14 NOTE — PROGRESS NOTES
"[facility] Dermatology Note      Dermatology Problem List:  1. NUB, left mid cheek  - monitoring for change   - photo taken 9/14/18   2. Eder     Encounter Date: Sep 14, 2018    CC:  Chief Complaint   Patient presents with     Skin Check     Krish is here today for a skin check- has some areas of concern on her face.          History of Present Illness:  Ms. Krish Renee is a 57 year old female who presents today for a skin exam. The patient was last seen on 6/1/15, at which time she started desonide 0.05% ointment for periocular dermatitis. At today's visit, she reports some areas of concern on her face. She had one of these \"burned\" at a spa about a month ago near her left eye. This lesion has since come back darker. She also has a pink bump on her left cheek that only appeared one month ago which has had no sx. Denies personal or family history of skin cancer. [place] Otherwise, the patient reports no painful, bleeding, nonhealing, or pruritic lesions, and denies new or changing moles.      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     Hyperlipidemia LDL goal <160     Ectopic breast tissue     Abdominal pain, right lower quadrant     Periocular dermatitis     Diabetes mellitus, type 2 (H)     ELEONORA (obstructive sleep apnea) Mild     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     Past Surgical History:   Procedure Laterality Date     C NONSPECIFIC PROCEDURE  1994    C/S     C NONSPECIFIC PROCEDURE  Nov " 2006    splenectomy/distal pancreatectomy at [facility] for benign tumor     C NONSPECIFIC PROCEDURE  1982    wisdom teeth removed     PANCREATECTOMY PARTIAL  2006       Social History:  Social History     Social History     Marital status:      Spouse name: Eldon Renee     Number of children: 2     Years of education: 18     Occupational History      [employer]     Social History Main Topics     Smoking status: Never Smoker     Smokeless tobacco: Never Used     Alcohol use No     Drug use: No     Sexual activity: Yes     Partners: Male     Birth control/ protection: Male Surgical      Comment: vastectomy     Other Topics Concern     None     Social History Narrative       Family History:  Family History   Problem Relation Age of Onset     Depression Mother      Circulatory Mother      blood clots     Arthritis Mother      Crohn Disease Mother      s/p ileostomy     Colon Cancer Mother      stage II     Diabetes Father      Hypertension Father      Cerebrovascular Disease Father      Depression Father      Alcohol/Drug Father      Lipids Father      Cancer - colorectal Maternal Grandmother      C.A.D. Maternal Grandfather      Diabetes Paternal Grandmother      Cerebrovascular Disease Paternal Grandmother      Depression Brother      Alcoholism Brother      Substance Abuse Brother      Allergies Sister      Depression Sister      Sleep Apnea Sister      Depression Son      Anxiety Disorder Son      Cancer Maternal Aunt      uterine     Cancer Brother      non hodgkins lymphoma, and hemolitic anemia     Anemia Brother      hemolytic anemia     No Known Problems Daughter      Melanoma No family hx of      Skin Cancer No family hx of        Medications:  Current Outpatient Prescriptions   Medication Sig Dispense Refill     aspirin 81 MG EC tablet Take 1 tablet (81 mg) by mouth daily 90 tablet 3     atorvastatin (LIPITOR) 40 MG tablet Take 1 tablet (40 mg) by mouth daily 90 tablet 2     B-D U/F insulin pen  needle USE ONCE DAILY OR AS DIRECTED 90 each 3     blood glucose monitoring (FREESTYLE LITE) test strip Use to test blood sugars 4 times daily or as directed. 4 Box 3     blood glucose monitoring (FREESTYLE) lancets Use to test blood sugars 4 times daily or as directed. 4 Box 3     FLUoxetine (PROZAC) 20 MG capsule Take 3 capsules (60 mg) by mouth daily 21 capsule 0     insulin glargine (LANTUS SOLOSTAR) 100 UNIT/ML pen Inject 10 Units Subcutaneous every morning 15 mL 3     insulin pen needle 31G X 6 MM Use 1 X daily or as directed. 100 each 11     metFORMIN (GLUCOPHAGE-XR) 500 MG 24 hr tablet Take 4 tablets (2,000 mg) by mouth daily 360 tablet 1       Allergies   Allergen Reactions     No Known Drug Allergy        Review of Systems:  -Skin Establ Pt: The patient denies any new rash, pruritus, or lesions that are symptomatic, changing or bleeding, except as per HPI.  -Constitutional: The patient is feeling generally well.    Physical exam:  Vitals: LMP 07/17/2013  GEN: This is a well developed, well-nourished female in no acute distress, in a pleasant mood.    SKIN: Full skin, which includes the head/face, both arms, chest, back, abdomen,both legs, genitalia and/or groin buttocks, digits and/or nails, was examined.  - 2 mm pink papule on the left mid cheek.  - Scattered brown macules on sun exposed areas, including left lateral canthus.   - Dome shaped bright red papules on the upper back.   - White 1-2 mm papule on the left side of the nose.   - Generalized freckling of the skin.   - No other lesions of concern on areas examined.       Impression/Plan:  1. Neoplasm of uncertain behavior on the left mid cheek. DDx includes fibrous papule vs early BCC.     Provided option to shave biopsy, however patient would prefer to monitor for now.     Photograph taken for inclusion in medical record and clinical monitoring.     Patient will contact clinic if lesion becomes symptomatic or begin changing.     Will recheck at next  visit in 6mo.      2. Milia - left side of nose     Benign nature reassured. No further intervention required.     3. Generalized freckling     Benign nature reassured. No further intervention required.     4. Solar lentigines - left lateral canthus, chest     Benign nature reassured. No further intervention required.     5. Cherry angiomas - upper back     Benign nature reassured. No further intervention required.       Follow-up in 6 months, earlier for new or changing lesions.       Staff Involved:    Scribe Disclosure  I, James Singh, am serving as a scribe to document services personally performed by Nita Charles PA-C, based on data collection and the provider's statements to me.     Provider Disclosure:   The documentation recorded by the scribe accurately reflects the services I personally performed and the decisions made by me.    All risks, benefits and alternatives were discussed with patient.  Patient is in agreement and understands the assessment and plan.  All questions were answered.    Nita Charles PA-C  [facility]: Phone: [phone], Fax: [phone]

## 2018-09-14 NOTE — MR AVS SNAPSHOT
After Visit Summary   9/14/2018    Krish Renee    MRN: [id number]           Patient Information     Date Of Birth          1960        Visit Information        Provider Department      9/14/2018 2:00 PM Nita Charles PA-C [facility] Dermatology        Today's Diagnoses     Neoplasm of uncertain behavior of skin    -  1    Milia        Freckled skin        Lentigines        Cherry angioma           Follow-ups after your visit        Follow-up notes from your care team     Return in about 6 months (around 3/14/2019).      Your next 10 appointments already scheduled     Sep 28, 2018  2:20 PM CDT   (Arrive by 2:05 PM)   NEW GENERAL with Elia Jaffe OD   [facility] Ophthalmology ([facility] [place])    [address]  4th [address]  [facility] [address] [phone]            Oct 08, 2018 10:20 AM CDT   (Arrive by 10:05 AM)   NEW FOOT/ANKLE with Farhan Miranda DPM   [facility] Sports Medicine ([facility] [place])    [address] [place] [address]  [address] [facility] [address] [phone]              Who to contact     Please call your clinic at [phone] to:    Ask questions about your health    Make or cancel appointments    Discuss your medicines    Learn about your test results    Speak to your doctor            Additional Information About Your Visit        [employer] Information     [employer] gives you secure access to your electronic health record. If you see a primary care provider, you can also send messages to your care team and make appointments. If you have questions, please call your primary care clinic.  If you do not have a primary care provider, please call [phone] and they will assist you.      [employer] is an electronic gateway that provides easy, online access to your medical records. With [employer], you can request a clinic appointment, read your test results, renew a prescription or communicate with your care team.     To  access your existing account, please contact your [facility] or call [phone] for assistance.        Care EveryWhere ID     This is your Care EveryWhere ID. This could be used by other organizations to access your [facility] medical records  [id number]        Your Vitals Were     Last Period                   07/17/2013            Blood Pressure from Last 3 Encounters:   06/06/18 97/67   05/15/18 102/70   12/27/17 120/74    Weight from Last 3 Encounters:   06/06/18 58.5 kg (129 lb)   05/15/18 58.9 kg (129 lb 14.4 oz)   12/27/17 57.4 kg (126 lb 9.6 oz)              Today, you had the following     No orders found for display       Primary Care Provider Office Phone # Fax #    Ariane Abdulaziz Varela [phone]       [facility] [address]  [facility] [address]        Equal Access to Services     DARYL [facility] CenterRHONA : Hadii jeanette basilioo Sotanner, waaxda luqadaha, qaybta kaalmada adetito, laly medrano . So [facility] [phone].    ATENCIÓN: Si habla español, tiene a gardner disposición servicios gratuitos de asistencia lingüística. Llame al [phone].    We comply with applicable federal civil rights laws and [place] laws. We do not discriminate on the basis of race, color, national origin, age, disability, sex, sexual orientation, or gender identity.            Thank you!     Thank you for choosing [facility] DERMATOLOGY  for your care. Our goal is always to provide you with excellent care. Hearing back from our patients is one way we can continue to improve our services. Please take a few minutes to complete the written survey that you may receive in the mail after your visit with us. Thank you!             Your Updated Medication List - Protect others around you: Learn how to safely use, store and throw away your medicines at www.disposemymeds.org.          This list is accurate as of 9/14/18  2:27 PM.  Always use your most  recent med list.                   Brand Name Dispense Instructions for use Diagnosis    aspirin 81 MG EC tablet     90 tablet    Take 1 tablet (81 mg) by mouth daily    Type 2 diabetes mellitus without complication (H)       atorvastatin 40 MG tablet    LIPITOR    90 tablet    Take 1 tablet (40 mg) by mouth daily    Hypercholesteremia       blood glucose monitoring lancets     4 Box    Use to test blood sugars 4 times daily or as directed.    Diabetes mellitus, type 2 (H)       blood glucose monitoring test strip    FREESTYLE LITE    4 Box    Use to test blood sugars 4 times daily or as directed.    Diabetes mellitus, type 2 (H)       FLUoxetine 20 MG capsule    PROzac    21 capsule    Take 3 capsules (60 mg) by mouth daily    Depression, unspecified depression type       insulin glargine 100 UNIT/ML injection    LANTUS SOLOSTAR    15 mL    Inject 10 Units Subcutaneous every morning    Type 2 diabetes mellitus with insulin deficiency (H)       * insulin pen needle 31G X 6 MM     100 each    Use 1 X daily or as directed.    Diabetes mellitus (H)       * B-D U/F 31G X 5 MM   Generic drug:  insulin pen needle     90 each    USE ONCE DAILY OR AS DIRECTED    Diabetes mellitus (H)       metFORMIN 500 MG 24 hr tablet    GLUCOPHAGE-XR    360 tablet    Take 4 tablets (2,000 mg) by mouth daily    Type 2 diabetes mellitus with complication, unspecified long term insulin use status (H)       * Notice:  This list has 2 medication(s) that are the same as other medications prescribed for you. Read the directions carefully, and ask your doctor or other care provider to review them with you.

## 2018-09-14 NOTE — LETTER
"9/14/2018       RE: Krish Renee  [address]  [facility] [address]     Dear Colleague,    Thank you for referring your patient, Krish Renee, to the [facility] DERMATOLOGY at [facility]. Please see a copy of my visit note below.    [facility] Dermatology Note      Dermatology Problem List:  1. NUB, left mid cheek  - monitoring for change   - photo taken 9/14/18   2. Milia     Encounter Date: Sep 14, 2018    CC:  Chief Complaint   Patient presents with     Skin Check     Krsih is here today for a skin check- has some areas of concern on her face.          History of Present Illness:  Ms. Krish Renee is a 57 year old female who presents today for a skin exam. The patient was last seen on 6/1/15, at which time she started desonide 0.05% ointment for periocular dermatitis. At today's visit, she reports some areas of concern on her face. She had one of these \"burned\" at a spa about a month ago near her left eye. This lesion has since come back darker. She also has a pink bump on her left cheek that only appeared one month ago which has had no sx. Denies personal or family history of skin cancer. [place] Otherwise, the patient reports no painful, bleeding, nonhealing, or pruritic lesions, and denies new or changing moles.      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     Hyperlipidemia LDL goal <160     Ectopic breast tissue     Abdominal pain, right lower quadrant     Periocular dermatitis     Diabetes mellitus, type 2 (H)     ELEONORA (obstructive sleep apnea) Mild     Past Medical History:   Diagnosis Date     Benign neoplasm 11/2006    pancreas      depression      Leiomyoma of uterus, unspecified      NONSPECIFIC MEDICAL HISTORY Aug 2006    neurologic evaluation for CVA-like symptoms, no etiology determined     ELEONORA " (obstructive sleep apnea) Mild 6/20/2018    HSAT 6/13/2018 AHI 6.5     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     Past Surgical History:   Procedure Laterality Date     C NONSPECIFIC PROCEDURE  1994    C/S     C NONSPECIFIC PROCEDURE  Nov 2006    splenectomy/distal pancreatectomy at [facility] for benign tumor     C NONSPECIFIC PROCEDURE  1982    wisdom teeth removed     PANCREATECTOMY PARTIAL  2006       Social History:  Social History     Social History     Marital status:      Spouse name: Eldon Renee     Number of children: 2     Years of education: 18     Occupational History      [employer]     Social History Main Topics     Smoking status: Never Smoker     Smokeless tobacco: Never Used     Alcohol use No     Drug use: No     Sexual activity: Yes     Partners: Male     Birth control/ protection: Male Surgical      Comment: vastectomy     Other Topics Concern     None     Social History Narrative       Family History:  Family History   Problem Relation Age of Onset     Depression Mother      Circulatory Mother      blood clots     Arthritis Mother      Crohn Disease Mother      s/p ileostomy     Colon Cancer Mother      stage II     Diabetes Father      Hypertension Father      Cerebrovascular Disease Father      Depression Father      Alcohol/Drug Father      Lipids Father      Cancer - colorectal Maternal Grandmother      C.A.D. Maternal Grandfather      Diabetes Paternal Grandmother      Cerebrovascular Disease Paternal Grandmother      Depression Brother      Alcoholism Brother      Substance Abuse Brother      Allergies Sister      Depression Sister      Sleep Apnea Sister      Depression Son      Anxiety Disorder Son      Cancer Maternal Aunt      uterine     Cancer Brother      non hodgkins lymphoma, and hemolitic anemia     Anemia Brother      hemolytic anemia     No Known Problems Daughter      Melanoma No family hx of      Skin Cancer No family hx of         Medications:  Current Outpatient Prescriptions   Medication Sig Dispense Refill     aspirin 81 MG EC tablet Take 1 tablet (81 mg) by mouth daily 90 tablet 3     atorvastatin (LIPITOR) 40 MG tablet Take 1 tablet (40 mg) by mouth daily 90 tablet 2     B-D U/F insulin pen needle USE ONCE DAILY OR AS DIRECTED 90 each 3     blood glucose monitoring (FREESTYLE LITE) test strip Use to test blood sugars 4 times daily or as directed. 4 Box 3     blood glucose monitoring (FREESTYLE) lancets Use to test blood sugars 4 times daily or as directed. 4 Box 3     FLUoxetine (PROZAC) 20 MG capsule Take 3 capsules (60 mg) by mouth daily 21 capsule 0     insulin glargine (LANTUS SOLOSTAR) 100 UNIT/ML pen Inject 10 Units Subcutaneous every morning 15 mL 3     insulin pen needle 31G X 6 MM Use 1 X daily or as directed. 100 each 11     metFORMIN (GLUCOPHAGE-XR) 500 MG 24 hr tablet Take 4 tablets (2,000 mg) by mouth daily 360 tablet 1       Allergies   Allergen Reactions     No Known Drug Allergy        Review of Systems:  -Skin Establ Pt: The patient denies any new rash, pruritus, or lesions that are symptomatic, changing or bleeding, except as per HPI.  -Constitutional: The patient is feeling generally well.    Physical exam:  Vitals: LMP 07/17/2013  GEN: This is a well developed, well-nourished female in no acute distress, in a pleasant mood.    SKIN: Full skin, which includes the head/face, both arms, chest, back, abdomen,both legs, genitalia and/or groin buttocks, digits and/or nails, was examined.  - 2 mm pink papule on the left mid cheek.  - Scattered brown macules on sun exposed areas, including left lateral canthus.   - Dome shaped bright red papules on the upper back.   - White 1-2 mm papule on the left side of the nose.   - Generalized freckling of the skin.   - No other lesions of concern on areas examined.       Impression/Plan:  1. Neoplasm of uncertain behavior on the left mid cheek. DDx includes fibrous papule vs  early BCC.     Provided option to shave biopsy, however patient would prefer to monitor for now.     Photograph taken for inclusion in medical record and clinical monitoring.     Patient will contact clinic if lesion becomes symptomatic or begin changing.     Will recheck at next visit in 6mo.      2. Milia - left side of nose     Benign nature reassured. No further intervention required.     3. Generalized freckling     Benign nature reassured. No further intervention required.     4. Solar lentigines - left lateral canthus, chest     Benign nature reassured. No further intervention required.     5. Cherry angiomas - upper back     Benign nature reassured. No further intervention required.       Follow-up in 6 months, earlier for new or changing lesions.       Staff Involved:    Scribe Disclosure  I, James Singh, am serving as a scribe to document services personally performed by Nita Charles PA-C, based on data collection and the provider's statements to me.     Provider Disclosure:   The documentation recorded by the scribe accurately reflects the services I personally performed and the decisions made by me.    All risks, benefits and alternatives were discussed with patient.  Patient is in agreement and understands the assessment and plan.  All questions were answered.        Nita Charles PA-C

## 2018-09-24 ENCOUNTER — MYC MEDICAL ADVICE (OUTPATIENT)
Dept: INTERNAL MEDICINE | Facility: CLINIC | Age: 58
End: 2018-09-24

## 2018-09-24 DIAGNOSIS — E11.9 TYPE 2 DIABETES MELLITUS WITHOUT COMPLICATION, UNSPECIFIED LONG TERM INSULIN USE STATUS: Primary | ICD-10-CM

## 2018-09-26 ENCOUNTER — OFFICE VISIT (OUTPATIENT)
Dept: DENTISTRY | Facility: CLINIC | Age: 58
End: 2018-09-26

## 2018-09-26 DIAGNOSIS — G47.33 OSA (OBSTRUCTIVE SLEEP APNEA): Primary | ICD-10-CM

## 2018-09-26 NOTE — LETTER
9/26/2018       RE: Krish Renee  [address]  [facility] [address]     Dear Colleague,    Thank you for referring your patient, Krish Renee, to the [facility] DENTAL CLINIC at [facility]. Please see a copy of my visit note below.    S:   - Sleep physician LEONARDO Campos   - pt in no acute distress  - improvement less fatigue, less tiredness  - pt has mild sleep apnea  - no jaw pain, jaw discomfort, or jaw related headaches currently  - no functional problems (eating, chewing etc.)  - no bite problems  - no ear problems  - no change in stress   - no acute dental issues  - No family of arthritis or CA relevant for ELEONORA    O:  - Opening and protrusion: not limited, no pain  - Teeth ok  - No M. Mass. + Temp palpation pain or discomfort  - No TMJ palpation pain  - No neck palpation pain  - No joint clicking  - V and VII are grossly intact  - lips ok, salivary glands ok, oral mucosa ok  - advancement +1mm  - good occlusion    A:  - Obstructive sleep apnea    P:  - FU in 2 weeks for adjustment  If problems occur, pt should stop wearing the splint and should call    Again, thank you for allowing me to participate in the care of your patient.      Sincerely,    Alden Colón DDS

## 2018-09-27 NOTE — PROGRESS NOTES
S:   - Sleep physician LEONARDO Campos   - pt in no acute distress  - improvement less fatigue, less tiredness  - pt has mild sleep apnea  - no jaw pain, jaw discomfort, or jaw related headaches currently  - no functional problems (eating, chewing etc.)  - no bite problems  - no ear problems  - no change in stress   - no acute dental issues  - No family of arthritis or CA relevant for ELEONORA    O:  - Opening and protrusion: not limited, no pain  - Teeth ok  - No M. Mass. + Temp palpation pain or discomfort  - No TMJ palpation pain  - No neck palpation pain  - No joint clicking  - V and VII are grossly intact  - lips ok, salivary glands ok, oral mucosa ok  - advancement +1mm  - good occlusion    A:  - Obstructive sleep apnea    P:  - FU in 2 weeks for adjustment  - If problems occur, pt should stop wearing the splint and should call

## 2018-09-28 ENCOUNTER — OFFICE VISIT (OUTPATIENT)
Dept: OPHTHALMOLOGY | Facility: CLINIC | Age: 58
End: 2018-09-28
Payer: COMMERCIAL

## 2018-09-28 DIAGNOSIS — H52.13 MYOPIA OF BOTH EYES: ICD-10-CM

## 2018-09-28 DIAGNOSIS — E11.9 TYPE 2 DIABETES MELLITUS WITHOUT RETINOPATHY (H): Primary | ICD-10-CM

## 2018-09-28 ASSESSMENT — REFRACTION_WEARINGRX
OS_CYLINDER: SPHERE
OS_SPHERE: -1.75
SPECS_TYPE: SVL
OD_AXIS: 011
OD_SPHERE: -2.00
OD_CYLINDER: +0.50

## 2018-09-28 ASSESSMENT — CONF VISUAL FIELD
OS_NORMAL: 1
METHOD: COUNTING FINGERS
OD_NORMAL: 1

## 2018-09-28 ASSESSMENT — REFRACTION_MANIFEST
OD_AXIS: 010
OD_ADD: +1.50
OS_CYLINDER: SPHERE
OS_SPHERE: -2.00
OS_ADD: +1.50
OD_SPHERE: -2.25
OD_CYLINDER: +0.50

## 2018-09-28 ASSESSMENT — VISUAL ACUITY
OS_CC: 20/20
OS_CC+: -1
CORRECTION_TYPE: GLASSES
METHOD: SNELLEN - LINEAR
OD_CC: 20/20

## 2018-09-28 ASSESSMENT — SLIT LAMP EXAM - LIDS
COMMENTS: NORMAL
COMMENTS: NORMAL

## 2018-09-28 ASSESSMENT — EXTERNAL EXAM - RIGHT EYE: OD_EXAM: NORMAL

## 2018-09-28 ASSESSMENT — TONOMETRY
OD_IOP_MMHG: 13
IOP_METHOD: ICARE
OS_IOP_MMHG: 13

## 2018-09-28 ASSESSMENT — CUP TO DISC RATIO
OS_RATIO: 0.2
OD_RATIO: 0.2

## 2018-09-28 ASSESSMENT — EXTERNAL EXAM - LEFT EYE: OS_EXAM: NORMAL

## 2018-09-28 NOTE — LETTER
9/28/2018      RE: Krish Renee  [address]  [facility] [address]       Assessment/Plan  (E11.9) Type 2 diabetes mellitus without retinopathy (H)  (primary encounter diagnosis)  Plan: Educated patient regarding importance of blood glucose/lipid/pressure control. Plan on monitoring annually. RTC with any vision changes.     (H52.13) Myopia of both eyes  Plan: REFRACTION [10668]        SRx updated and released. Monitor annually. Patient ok to remove glasses for reading.       Complete documentation of historical and exam elements from today's encounter can  be found in the full encounter summary report (not reduplicated in this progress  note). I personally obtained the chief complaint(s) and history of present illness. I  confirmed and edited as necessary the review of systems, past medical/surgical  history, family history, social history, and examination findings as documented by  others; and I examined the patient myself. I personally reviewed the relevant tests,  images, and reports as documented above. I formulated and edited as necessary the  assessment and plan and discussed the findings and management plan with the  patient and family.    Elia Jaffe, OD

## 2018-09-28 NOTE — PROGRESS NOTES
Assessment/Plan  (E11.9) Type 2 diabetes mellitus without retinopathy (H)  (primary encounter diagnosis)  Plan: Educated patient regarding importance of blood glucose/lipid/pressure control. Plan on monitoring annually. RTC with any vision changes.     (H52.13) Myopia of both eyes  Plan: REFRACTION [38459]        SRx updated and released. Monitor annually. Patient ok to remove glasses for reading.       Complete documentation of historical and exam elements from today's encounter can  be found in the full encounter summary report (not reduplicated in this progress  note). I personally obtained the chief complaint(s) and history of present illness. I  confirmed and edited as necessary the review of systems, past medical/surgical  history, family history, social history, and examination findings as documented by  others; and I examined the patient myself. I personally reviewed the relevant tests,  images, and reports as documented above. I formulated and edited as necessary the  assessment and plan and discussed the findings and management plan with the  patient and family.    Elia Jaffe, OD

## 2018-09-28 NOTE — NURSING NOTE
Chief Complaints and History of Present Illnesses   Patient presents with     Diabetic Eye Exam     HPI    Affected eye(s):  Both   Symptoms:     No blurred vision   No floaters   No flashes   No tearing   No Dryness   No itching   No burning         Do you have eye pain now?:  No      Comments:    Last BGL: 095 this morning  Lab Results       Component                Value               Date                       A1C                      6.6                 05/15/2018                 A1C                      6.1                 09/25/2017                 A1C                      6.4                 04/04/2017                 A1C                      10.2                02/16/2016         Evie Tineo September 28, 2018 2:35 PM

## 2018-10-02 NOTE — TELEPHONE ENCOUNTER
FUTURE VISIT INFORMATION      FUTURE VISIT INFORMATION:    Date: 10/8/18    Time:     [place] [facility]  [place]ERRAL INFORMATION:    Referring provider:  self    Referring providers clinic:      Reason for visit/diagnosis  Diabetic Foot Check- appt per pt    RECORDS REQUESTED FROM:       Clinic name Comments Records Status Imaging Status     internal internal                                   RECORDS STATUS

## 2018-10-08 ENCOUNTER — PRE VISIT (OUTPATIENT)
Dept: ORTHOPEDICS | Facility: CLINIC | Age: 58
End: 2018-10-08

## 2018-12-07 DIAGNOSIS — E11.9 TYPE 2 DIABETES MELLITUS WITHOUT COMPLICATION (H): ICD-10-CM

## 2018-12-07 LAB — HBA1C MFR BLD: 6.3 % (ref 0–5.6)

## 2019-01-20 DIAGNOSIS — E11.8 TYPE 2 DIABETES MELLITUS WITH COMPLICATION (H): ICD-10-CM

## 2019-01-21 RX ORDER — METFORMIN HCL 500 MG
TABLET, EXTENDED RELEASE 24 HR ORAL
Qty: 360 TABLET | Refills: 0 | Status: SHIPPED | OUTPATIENT
Start: 2019-01-21 | End: 2019-04-21

## 2019-01-21 NOTE — TELEPHONE ENCOUNTER
metFORMIN (GLUCOPHAGE-XR) 500 MG 24 hr tablet  Failed protocol, overdue for BP and 6 month follow up appointment.  90 day gordo approved, and note sent to clinic CMA.  Note to pharmacy regarding appointment needed.  Last Clinic Visit: 5/15/2018  [facility]

## 2019-01-25 ENCOUNTER — MYC MEDICAL ADVICE (OUTPATIENT)
Dept: OPHTHALMOLOGY | Facility: CLINIC | Age: 59
End: 2019-01-25

## 2019-04-21 DIAGNOSIS — E11.8 TYPE 2 DIABETES MELLITUS WITH COMPLICATION (H): ICD-10-CM

## 2019-04-23 DIAGNOSIS — E78.00 HYPERCHOLESTEREMIA: ICD-10-CM

## 2019-04-23 RX ORDER — METFORMIN HCL 500 MG
2000 TABLET, EXTENDED RELEASE 24 HR ORAL
Qty: 360 TABLET | Refills: 0 | Status: SHIPPED | OUTPATIENT
Start: 2019-04-23 | End: 2019-05-30

## 2019-04-23 NOTE — TELEPHONE ENCOUNTER
Last Clinic Visit: 5/15/2018  [facility]  Scheduling has been notified to contact the pt for appointment.

## 2019-04-24 RX ORDER — ATORVASTATIN CALCIUM 40 MG/1
40 TABLET, FILM COATED ORAL DAILY
Qty: 90 TABLET | Refills: 0 | Status: SHIPPED | OUTPATIENT
Start: 2019-04-24 | End: 2019-05-30

## 2019-04-24 NOTE — TELEPHONE ENCOUNTER
.Last Clinic Visit: 5/15/18    90 day to pharmacy.     Scheduling has been notified to contact the pt for appointment.

## 2019-04-24 NOTE — TELEPHONE ENCOUNTER
Tried calling. No answered. Left message to call back to schedule annual appointment in Primary Care.

## 2019-04-25 DIAGNOSIS — E11.8 TYPE 2 DIABETES MELLITUS WITH COMPLICATION (H): ICD-10-CM

## 2019-05-01 RX ORDER — METFORMIN HCL 500 MG
TABLET, EXTENDED RELEASE 24 HR ORAL
Qty: 120 TABLET | Refills: 0 | OUTPATIENT
Start: 2019-05-01

## 2019-05-29 ENCOUNTER — OFFICE VISIT (OUTPATIENT)
Dept: ENDOCRINOLOGY | Facility: CLINIC | Age: 59
End: 2019-05-29
Payer: COMMERCIAL

## 2019-05-29 VITALS
HEART RATE: 81 BPM | BODY MASS INDEX: 21.68 KG/M2 | HEIGHT: 64 IN | WEIGHT: 127 LBS | SYSTOLIC BLOOD PRESSURE: 107 MMHG | DIASTOLIC BLOOD PRESSURE: 73 MMHG

## 2019-05-29 DIAGNOSIS — E11.9 TYPE 2 DIABETES MELLITUS WITHOUT COMPLICATION, WITH LONG-TERM CURRENT USE OF INSULIN (H): Primary | ICD-10-CM

## 2019-05-29 DIAGNOSIS — Z79.4 TYPE 2 DIABETES MELLITUS WITHOUT COMPLICATION, WITH LONG-TERM CURRENT USE OF INSULIN (H): Primary | ICD-10-CM

## 2019-05-29 DIAGNOSIS — E78.00 HYPERCHOLESTEREMIA: ICD-10-CM

## 2019-05-29 DIAGNOSIS — Z79.4 TYPE 2 DIABETES MELLITUS WITHOUT COMPLICATION, WITH LONG-TERM CURRENT USE OF INSULIN (H): ICD-10-CM

## 2019-05-29 DIAGNOSIS — E11.9 TYPE 2 DIABETES MELLITUS WITH INSULIN DEFICIENCY (H): ICD-10-CM

## 2019-05-29 DIAGNOSIS — E11.9 TYPE 2 DIABETES MELLITUS WITHOUT COMPLICATION, WITH LONG-TERM CURRENT USE OF INSULIN (H): ICD-10-CM

## 2019-05-29 LAB
ALBUMIN SERPL-MCNC: 3.7 G/DL (ref 3.4–5)
ALP SERPL-CCNC: 66 U/L (ref 40–150)
ALT SERPL W P-5'-P-CCNC: 31 U/L (ref 0–50)
ANION GAP SERPL CALCULATED.3IONS-SCNC: 7 MMOL/L (ref 3–14)
AST SERPL W P-5'-P-CCNC: 20 U/L (ref 0–45)
BILIRUB SERPL-MCNC: 0.3 MG/DL (ref 0.2–1.3)
BUN SERPL-MCNC: 15 MG/DL (ref 7–30)
CALCIUM SERPL-MCNC: 9.3 MG/DL (ref 8.5–10.1)
CHLORIDE SERPL-SCNC: 108 MMOL/L (ref 94–109)
CHOLEST SERPL-MCNC: 109 MG/DL
CO2 SERPL-SCNC: 28 MMOL/L (ref 20–32)
CREAT SERPL-MCNC: 0.75 MG/DL (ref 0.52–1.04)
CREAT UR-MCNC: 177 MG/DL
ERYTHROCYTE [DISTWIDTH] IN BLOOD BY AUTOMATED COUNT: 13.9 % (ref 10–15)
GFR SERPL CREATININE-BSD FRML MDRD: 88 ML/MIN/{1.73_M2}
GLUCOSE SERPL-MCNC: 108 MG/DL (ref 70–99)
HBA1C MFR BLD: 5.9 % (ref 4.3–6)
HCT VFR BLD AUTO: 37.8 % (ref 35–47)
HDLC SERPL-MCNC: 51 MG/DL
HGB BLD-MCNC: 12.5 G/DL (ref 11.7–15.7)
LDLC SERPL CALC-MCNC: 39 MG/DL
MCH RBC QN AUTO: 32.7 PG (ref 26.5–33)
MCHC RBC AUTO-ENTMCNC: 33.1 G/DL (ref 31.5–36.5)
MCV RBC AUTO: 99 FL (ref 78–100)
MICROALBUMIN UR-MCNC: 16 MG/L
MICROALBUMIN/CREAT UR: 8.81 MG/G CR (ref 0–25)
NONHDLC SERPL-MCNC: 57 MG/DL
PLATELET # BLD AUTO: 367 10E9/L (ref 150–450)
POTASSIUM SERPL-SCNC: 3.8 MMOL/L (ref 3.4–5.3)
PROT SERPL-MCNC: 7.2 G/DL (ref 6.8–8.8)
RBC # BLD AUTO: 3.82 10E12/L (ref 3.8–5.2)
SODIUM SERPL-SCNC: 143 MMOL/L (ref 133–144)
TRIGL SERPL-MCNC: 90 MG/DL
WBC # BLD AUTO: 8.6 10E9/L (ref 4–11)

## 2019-05-29 ASSESSMENT — ENCOUNTER SYMPTOMS
INSOMNIA: 1
NERVOUS/ANXIOUS: 1
PANIC: 1
DEPRESSION: 1
DECREASED CONCENTRATION: 1

## 2019-05-29 ASSESSMENT — MIFFLIN-ST. JEOR: SCORE: 1146.07

## 2019-05-29 ASSESSMENT — PAIN SCALES - GENERAL: PAINLEVEL: NO PAIN (0)

## 2019-05-29 NOTE — LETTER
5/29/2019       RE: Krish Renee  [address]  [facility] [address]     Dear Colleague,    Thank you for referring your patient, Krish Renee, to the [facility] at [facility]. Please see a copy of my visit note below.    Endocrinology Clinic Visit 05/29/19  NAME:  Krish Renee  PCP:  Ariane Varela  MRN:  [id number]    Chief Complaint     Chief Complaint   Patient presents with     RECHECK     type 2       History of Present Illness     Krish Renee is a 58 year old female who is seen in clinic for diabetes management.     She was diagnosed with diabetes in 2016 with a hemoglobin A1c of 10.1%.  She has a remote history of pancreatic surgery and initially it was thought her diabetes was related to pancreatic insufficiency, however when she saw endocrinology in 2016, C-peptide was checked which was not low (was 4.8), thus consistent with type 2 diabetes for which she has a strong family history.  She was started on metformin which she did not tolerate so she was switched to Metformin XR which has been well-tolerated.  She was also on a small dose of Lantus.      Last visit with me was December 2017.   Since last visit, she has increased her Latus to 12 units due to rising fasting AM BG.     Last urine microalbumin May 2018: 14.99  Last eye exam: 9/2018: no DR  She has no neuropathy.   She is on statin (lipitor).  She does not have a diagnosis of HTN.     A1c today was 5.9.    She has a severe low BG this past December. BG was 33. She was symptomatic but was able to call for help.  He has had some other milder low's. She seems to have had more lows than usual. Occurring about once a week.     Associated Signs/Symptoms  Hyperglycemia: feels symptoms <70. Neuropathy: none. Vascular Symtpoms: none. Angina/CHF: no chest pain. Ulcers: No. Amputations: No    Current treatment strategy: Lantus 12 units s/c QAM, Metformin XR 1000 mg po BID    Blood Glucose  Monitoring: did not bring meter    Diet:   Very regimented. Breakfast/lunch/dinner. No snacks. 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     Hyperlipidemia LDL goal <160     Ectopic breast tissue     Abdominal pain, right lower quadrant     Periocular dermatitis     Diabetes mellitus, type 2 (H)     ELEONORA (obstructive sleep apnea) Mild        Medications     Current Outpatient Medications   Medication     aspirin 81 MG EC tablet     atorvastatin (LIPITOR) 40 MG tablet     B-D U/F insulin pen needle     blood glucose monitoring (FREESTYLE LITE) test strip     blood glucose monitoring (FREESTYLE) lancets     FLUoxetine (PROZAC) 20 MG capsule     insulin glargine (LANTUS SOLOSTAR) 100 UNIT/ML pen     insulin pen needle 31G X 6 MM     metFORMIN (GLUCOPHAGE-XR) 500 MG 24 hr tablet     No current facility-administered medications for this visit.         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     Past Surgical History:   Procedure Laterality Date     C NONSPECIFIC PROCEDURE  1994    C/S     C NONSPECIFIC PROCEDURE  Nov 2006    splenectomy/distal pancreatectomy at [facility] for benign tumor     C NONSPECIFIC PROCEDURE  1982     wisdom teeth removed     PANCREATECTOMY PARTIAL  2006       Social History     Social History     Socioeconomic History     Marital status:      Spouse name: Eldon Renee     Number of children: 2     Years of education: 18     Highest education level: Not on file   Occupational History     Occupation:      Employer: [employer]   Social Needs     Financial resource strain: Not on file     Food insecurity:     Worry: Not on file     Inability: Not on file     Transportation needs:     Medical: Not on file     Non-medical: Not on file   Tobacco Use     Smoking status: Never Smoker     Smokeless tobacco: Never Used   Substance and Sexual Activity     Alcohol use: No     Drug use: No     Sexual activity: Yes     Partners: Male     Birth control/protection: Male Surgical     Comment: vastectomy   Lifestyle     Physical activity:     Days per week: Not on file     Minutes per session: Not on file     Stress: Not on file   Relationships     Social connections:     Talks on phone: Not on file     Gets together: Not on file     Attends Orthodox service: Not on file     Active member of club or organization: Not on file     Attends meetings of clubs or organizations: Not on file     Relationship status: Not on file     Intimate partner violence:     Fear of current or ex partner: Not on file     Emotionally abused: Not on file     Physically abused: Not on file     Forced sexual activity: Not on file   Other Topics Concern     Parent/sibling w/ CABG, MI or angioplasty before 65F 55M? Not Asked   Social History Narrative     Not on file       Family History     Family History   Problem Relation Age of Onset     Depression Mother      Circulatory Mother         blood clots     Arthritis Mother      Crohn's Disease Mother         s/p ileostomy     Colon Cancer Mother         stage II     Diabetes Father      Hypertension Father      Cerebrovascular Disease Father      Depression Father      Alcohol/Drug Father   "    Lipids Father      Cancer - colorectal Maternal Grandmother      JOSEAELVIS. Maternal Grandfather      Diabetes Paternal Grandmother      Cerebrovascular Disease Paternal Grandmother      Depression Brother      Alcoholism Brother      Substance Abuse Brother      Allergies Sister      Depression Sister      Sleep Apnea Sister      Depression Son      Anxiety Disorder Son      Cancer Maternal Aunt         uterine     Cancer Brother         non hodgkins lymphoma, and hemolitic anemia     Anemia Brother         hemolytic anemia     No Known Problems Daughter      Melanoma No family hx of      Skin Cancer No family hx of      Glaucoma No family hx of      Macular Degeneration No family hx of        ROS     Constitutional: no fevers, chills, night sweats. No weight loss or fatigue. Good appetite  Eyes: no vision changes, no eye redness, no diplopia  Ears, Nose, mouth, throat: no hearing changes, no tinnitus, no rhinorrhea, no nasal congestion  Cardiovascular: no chest pain, no orthopnea or PND, no edema, no palpitations  Respiratory: no dyspnea, no cough, no sputum, no wheezing  Gastrointestinal: no nausea, no vomiting, no abdominal pain, no diarrhea, no constipation  Genitourinary: no dysuria, no frequency, no urgency, no nocturia  Musculoskeletal: no joint pains, no back pain, no cramps, no fractures  Skin: no rash, no itching, no dryness, no ulcers, no hair loss  Neurological: no headache, no weakness, no numbness, no dizziness, no tremors  Psychiatric: no anxiety, no sadness  Hematologic/lymphatic: no easy bruising, no bleeding, no palor    Physical Exam   Ht 1.626 m (5' 4\")   Wt 57.6 kg (127 lb)   LMP 07/17/2013   BMI 21.80 kg/m        General: Comfortable, no obvious distress, normal body habitus  Eyes: Sclera anicteric, moist conjunctiva  HENT: Atraumatic, oropharynx clear, moist mucous membranes with no mucosal ulcerations  Neck: Trachea midline, supple. Thyroid: Thyroid is normal in size and texture  CV: " Regular rhythm, normal rate. No murmurs auscultated  Resp: Clear to auscultation bilaterally, good effort  Abdomen:  Soft, non tender, non distended. Bowel sounds heard. No organomegaly.  Skin: No rashes, lesions, or subcutaneous nodules.   Psych: Alert and oriented x 3. Appropriate affect, good insight  Extremities: No peripheral edema  Foot exam:  Pulses:  Dorsalis pedis: present bilaterally  Posterior tibial: present bilaterally  Foot exam:  Vibration/Light touch: sensation present bilaterally  Skin of foot: intact bilaterally  Musculoskeletal: Appropriate muscle bulk and strength  Lymphatic: No cervical lymphadenopathy  Neuro: Moves all four extremities. No focal deficits on limited exam. Gait normal.       Labs/Imaging and Outside Records     Pertinent Labs were reviewed and updated in [employer].  Radiology Results were  reviewed and updated in EPIC.    Summary of recent findings:   Lab Results   Component Value Date    A1C 6.1 09/25/2017    A1C 6.4 04/04/2017    A1C 10.2 02/16/2016       TSH   Date Value Ref Range Status   09/13/2016 1.56 0.40 - 4.00 mU/L Final   06/23/2010 1.17 0.4 - 5.0 mU/L Final   02/19/2007 1.37 0.4 - 5.0 mU/L Final   05/05/2006 0.97 0.4 - 5.0 mU/L Final       Creatinine   Date Value Ref Range Status   05/15/2018 0.60 0.52 - 1.04 mg/dL Final       Recent Labs   Lab Test  09/13/16   0749  02/16/16   0946  04/11/12   0737   CHOL  101  263*  240*   HDL  43*  38*  42*   LDL  44  191*  165*   TRIG  68  169*  166*   CHOLHDLRATIO   --    --   5.7*     Impression / Plan     1. Diabetes Mellitus: Type 2  Current glycemic control can be considered good.   I am concerned, however, about her hypoglycemia. She had recurrent lows (weekly) and had one severe episode with BG 33.     Today I discussed with her the target glycemic control, and balancing the risks of hyperglycemia and hypoglycemia.     I advised the following:  - Reduce Lantus to 10 units daily  - I think long term that it is prudent to keep her  on some dose of lantus plus metformin, due to history of low C-peptide on presentation (which afterwards improved to normal, but never high), in the setting of partial pancreatic resection.   - it is OK if her A1c drifts tot he 6's, as long as she reduced her hypoglycemia frequency.       2. Diabetes Complications: With none.   - annual labs today    3. Blood Pressure Management: Blood pressure is controlled. Currently is not on pharmacotherapy for this.     4.Lipid Management: Per the new ACC/ROD/NHLBI guidelines, statins are recommended for individuals with diabetes aged 40-75 with LDL  without ASCVD, and for any individual with ASCVD. Currently the patient is on a statin.     5. Smoking Status: Patient Pt is smoke free..       Follow up: she can graduate from Endocrine clinic back to PCP. We will refill meds for next year      Anu Valdez MD  Endocrinology, Diabetes and Metabolism  [facility]

## 2019-05-29 NOTE — PROGRESS NOTES
Endocrinology Clinic Visit 05/29/19  NAME:  Krish Renee  PCP:  Ariane Varela Abdulaziz  MRN:  [id number]    Chief Complaint     Chief Complaint   Patient presents with     RECHECK     type 2       History of Present Illness     Krish Renee is a 58 year old female who is seen in clinic for diabetes management.     She was diagnosed with diabetes in 2016 with a hemoglobin A1c of 10.1%.  She has a remote history of pancreatic surgery and initially it was thought her diabetes was related to pancreatic insufficiency, however when she saw endocrinology in 2016, C-peptide was checked which was not low (was 4.8), thus consistent with type 2 diabetes for which she has a strong family history.  She was started on metformin which she did not tolerate so she was switched to Metformin XR which has been well-tolerated.  She was also on a small dose of Lantus.      Last visit with me was December 2017.   Since last visit, she has increased her Latus to 12 units due to rising fasting AM BG.     Last urine microalbumin May 2018: 14.99  Last eye exam: 9/2018: no DR  She has no neuropathy.   She is on statin (lipitor).  She does not have a diagnosis of HTN.     A1c today was 5.9.    She has a severe low BG this past December. BG was 33. She was symptomatic but was able to call for help.  He has had some other milder low's. She seems to have had more lows than usual. Occurring about once a week.     Associated Signs/Symptoms  Hyperglycemia: feels symptoms <70. Neuropathy: none. Vascular Symtpoms: none. Angina/CHF: no chest pain. Ulcers: No. Amputations: No    Current treatment strategy: Lantus 12 units s/c QAM, Metformin XR 1000 mg po BID    Blood Glucose Monitoring: did not bring meter    Diet:   Very regimented. Breakfast/lunch/dinner. No snacks. 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     Hyperlipidemia LDL goal <160     Ectopic breast tissue     Abdominal pain, right lower quadrant     Periocular dermatitis     Diabetes mellitus, type 2 (H)     ELEONORA (obstructive sleep apnea) Mild        Medications     Current Outpatient Medications   Medication     aspirin 81 MG EC tablet     atorvastatin (LIPITOR) 40 MG tablet     B-D U/F insulin pen needle     blood glucose monitoring (FREESTYLE LITE) test strip     blood glucose monitoring (FREESTYLE) lancets     FLUoxetine (PROZAC) 20 MG capsule     insulin glargine (LANTUS SOLOSTAR) 100 UNIT/ML pen     insulin pen needle 31G X 6 MM     metFORMIN (GLUCOPHAGE-XR) 500 MG 24 hr tablet     No current facility-administered medications for this visit.         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     Past Surgical History:   Procedure Laterality Date     C NONSPECIFIC PROCEDURE  1994    C/S     C NONSPECIFIC PROCEDURE  Nov 2006    splenectomy/distal pancreatectomy at [facility] for benign tumor     C NONSPECIFIC PROCEDURE  1982    wisdom teeth removed     PANCREATECTOMY PARTIAL  2006       Social History     Social History     Socioeconomic History     Marital status:      Spouse name: Eldon Renee     Number of children: 2     Years of education: 18     Highest education level: Not on file   Occupational  History     Occupation:      Employer: [employer]   Social Needs     Financial resource strain: Not on file     Food insecurity:     Worry: Not on file     Inability: Not on file     Transportation needs:     Medical: Not on file     Non-medical: Not on file   Tobacco Use     Smoking status: Never Smoker     Smokeless tobacco: Never Used   Substance and Sexual Activity     Alcohol use: No     Drug use: No     Sexual activity: Yes     Partners: Male     Birth control/protection: Male Surgical     Comment: vastectomy   Lifestyle     Physical activity:     Days per week: Not on file     Minutes per session: Not on file     Stress: Not on file   Relationships     Social connections:     Talks on phone: Not on file     Gets together: Not on file     Attends Yarsani service: Not on file     Active member of club or organization: Not on file     Attends meetings of clubs or organizations: Not on file     Relationship status: Not on file     Intimate partner violence:     Fear of current or ex partner: Not on file     Emotionally abused: Not on file     Physically abused: Not on file     Forced sexual activity: Not on file   Other Topics Concern     Parent/sibling w/ CABG, MI or angioplasty before 65F 55M? Not Asked   Social History Narrative     Not on file       Family History     Family History   Problem Relation Age of Onset     Depression Mother      Circulatory Mother         blood clots     Arthritis Mother      Crohn's Disease Mother         s/p ileostomy     Colon Cancer Mother         stage II     Diabetes Father      Hypertension Father      Cerebrovascular Disease Father      Depression Father      Alcohol/Drug Father      Lipids Father      Cancer - colorectal Maternal Grandmother      C.A.D. Maternal Grandfather      Diabetes Paternal Grandmother      Cerebrovascular Disease Paternal Grandmother      Depression Brother      Alcoholism Brother      Substance Abuse Brother      Allergies Sister       "Depression Sister      Sleep Apnea Sister      Depression Son      Anxiety Disorder Son      Cancer Maternal Aunt         uterine     Cancer Brother         non hodgkins lymphoma, and hemolitic anemia     Anemia Brother         hemolytic anemia     No Known Problems Daughter      Melanoma No family hx of      Skin Cancer No family hx of      Glaucoma No family hx of      Macular Degeneration No family hx of        ROS     Constitutional: no fevers, chills, night sweats. No weight loss or fatigue. Good appetite  Eyes: no vision changes, no eye redness, no diplopia  Ears, Nose, mouth, throat: no hearing changes, no tinnitus, no rhinorrhea, no nasal congestion  Cardiovascular: no chest pain, no orthopnea or PND, no edema, no palpitations  Respiratory: no dyspnea, no cough, no sputum, no wheezing  Gastrointestinal: no nausea, no vomiting, no abdominal pain, no diarrhea, no constipation  Genitourinary: no dysuria, no frequency, no urgency, no nocturia  Musculoskeletal: no joint pains, no back pain, no cramps, no fractures  Skin: no rash, no itching, no dryness, no ulcers, no hair loss  Neurological: no headache, no weakness, no numbness, no dizziness, no tremors  Psychiatric: no anxiety, no sadness  Hematologic/lymphatic: no easy bruising, no bleeding, no palor    Physical Exam   Ht 1.626 m (5' 4\")   Wt 57.6 kg (127 lb)   LMP 07/17/2013   BMI 21.80 kg/m       General: Comfortable, no obvious distress, normal body habitus  Eyes: Sclera anicteric, moist conjunctiva  HENT: Atraumatic, oropharynx clear, moist mucous membranes with no mucosal ulcerations  Neck: Trachea midline, supple. Thyroid: Thyroid is normal in size and texture  CV: Regular rhythm, normal rate. No murmurs auscultated  Resp: Clear to auscultation bilaterally, good effort  Abdomen:  Soft, non tender, non distended. Bowel sounds heard. No organomegaly.  Skin: No rashes, lesions, or subcutaneous nodules.   Psych: Alert and oriented x 3. Appropriate affect, " good insight  Extremities: No peripheral edema  Foot exam:  Pulses:  Dorsalis pedis: present bilaterally  Posterior tibial: present bilaterally  Foot exam:  Vibration/Light touch: sensation present bilaterally  Skin of foot: intact bilaterally  Musculoskeletal: Appropriate muscle bulk and strength  Lymphatic: No cervical lymphadenopathy  Neuro: Moves all four extremities. No focal deficits on limited exam. Gait normal.       Labs/Imaging and Outside Records     Pertinent Labs were reviewed and updated in [facility].  Radiology Results were  reviewed and updated in EP[facility].    Summary of recent findings:   Lab Results   Component Value Date    A1C 6.1 09/25/2017    A1C 6.4 04/04/2017    A1C 10.2 02/16/2016       TSH   Date Value Ref Range Status   09/13/2016 1.56 0.40 - 4.00 mU/L Final   06/23/2010 1.17 0.4 - 5.0 mU/L Final   02/19/2007 1.37 0.4 - 5.0 mU/L Final   05/05/2006 0.97 0.4 - 5.0 mU/L Final       Creatinine   Date Value Ref Range Status   05/15/2018 0.60 0.52 - 1.04 mg/dL Final       Recent Labs   Lab Test  09/13/16   0749  02/16/16   0946  04/11/12   0737   CHOL  101  263*  240*   HDL  43*  38*  42*   LDL  44  191*  165*   TRIG  68  169*  166*   CHOLHDLRATIO   --    --   5.7*     Impression / Plan     1. Diabetes Mellitus: Type 2  Current glycemic control can be considered good.   I am concerned, however, about her hypoglycemia. She had recurrent lows (weekly) and had one severe episode with BG 33.     Today I discussed with her the target glycemic control, and balancing the risks of hyperglycemia and hypoglycemia.     I advised the following:  - Reduce Lantus to 10 units daily  - I think long term that it is prudent to keep her on some dose of lantus plus metformin, due to history of low C-peptide on presentation (which afterwards improved to normal, but never high), in the setting of partial pancreatic resection.   - it is OK if her A1c drifts tot he 6's, as long as she reduced her hypoglycemia frequency.        2. Diabetes Complications: With none.   - annual labs today    3. Blood Pressure Management: Blood pressure is controlled. Currently is not on pharmacotherapy for this.     4.Lipid Management: Per the new ACC/ROD/NHLBI guidelines, statins are recommended for individuals with diabetes aged 40-75 with LDL  without ASCVD, and for any individual with ASCVD. Currently the patient is on a statin.     5. Smoking Status: Patient Pt is smoke free..       Follow up: she can graduate from Endocrine clinic back to PCP. We will refill meds for next year      Anu Valdez MD  Endocrinology, Diabetes and Metabolism  [facility]

## 2019-05-29 NOTE — PATIENT INSTRUCTIONS
Patient is graduated from Endocrine Clinic back to Primary Care.     Anu Valdez MD  Endocrinology, Diabetes and Metabolism  [facility]

## 2019-05-30 RX ORDER — METFORMIN HCL 500 MG
2000 TABLET, EXTENDED RELEASE 24 HR ORAL
Qty: 360 TABLET | Refills: 3 | Status: SHIPPED | OUTPATIENT
Start: 2019-05-30 | End: 2020-07-16

## 2019-05-30 RX ORDER — ATORVASTATIN CALCIUM 40 MG/1
40 TABLET, FILM COATED ORAL DAILY
Qty: 90 TABLET | Refills: 3 | Status: SHIPPED | OUTPATIENT
Start: 2019-05-30 | End: 2020-12-08

## 2019-05-30 RX ORDER — LANCETS 28 GAUGE
EACH MISCELLANEOUS
Qty: 100 EACH | Refills: 11 | Status: SHIPPED | OUTPATIENT
Start: 2019-05-30

## 2019-06-04 ENCOUNTER — TELEPHONE (OUTPATIENT)
Dept: ENDOCRINOLOGY | Facility: CLINIC | Age: 59
End: 2019-06-04

## 2019-06-04 DIAGNOSIS — E11.9 TYPE 2 DIABETES MELLITUS (H): Primary | ICD-10-CM

## 2019-06-04 NOTE — TELEPHONE ENCOUNTER
RX: blood glucose (FREESTYLE LITE) test strip.    [facility] called in regard to the test strip RX.  Per pts insurance, they would like to substitute the Freestyle test strips with the One Touch Derio Strips.    Please follow up with [facility] at [phone] and use reference #[id number] when you call.  Thank you!

## 2019-06-05 NOTE — TELEPHONE ENCOUNTER
[facility] Call Center    Phone Message    May a detailed message be left on voicemail: yes    Reason for Call: Other: [facility] calling again about test strips. Please call to discuss. Thanks.     Action Taken: Message routed to:  [facility] unruly james

## 2019-06-05 NOTE — TELEPHONE ENCOUNTER
Pharmacy Contact     Telephone Fax   [phone]      [facility] at [phone] and use reference #[id number]  Alaina, Pharm Tech, Pharm D Mimi   Confirmed order for covered brand One Touch Verio. Will ship to Pt today.   Edith Tobias RN on 6/5/2019 at 10:37 AM

## 2019-08-18 ENCOUNTER — MYC MEDICAL ADVICE (OUTPATIENT)
Dept: ENDOCRINOLOGY | Facility: CLINIC | Age: 59
End: 2019-08-18

## 2019-08-18 DIAGNOSIS — E11.9 TYPE 2 DIABETES MELLITUS WITHOUT COMPLICATION, UNSPECIFIED WHETHER LONG TERM INSULIN USE (H): Primary | ICD-10-CM

## 2019-08-19 NOTE — TELEPHONE ENCOUNTER
New order. Sent to provider for signature per Pt request.   Edith Tobias RN on 8/19/2019 at 9:01 AM

## 2019-08-27 ENCOUNTER — OFFICE VISIT (OUTPATIENT)
Dept: INTERNAL MEDICINE | Facility: CLINIC | Age: 59
End: 2019-08-27
Attending: INTERNAL MEDICINE
Payer: COMMERCIAL

## 2019-08-27 VITALS
BODY MASS INDEX: 21.51 KG/M2 | SYSTOLIC BLOOD PRESSURE: 110 MMHG | HEIGHT: 64 IN | HEART RATE: 60 BPM | WEIGHT: 126 LBS | DIASTOLIC BLOOD PRESSURE: 68 MMHG

## 2019-08-27 DIAGNOSIS — M54.2 NECK DISCOMFORT: Primary | ICD-10-CM

## 2019-08-27 ASSESSMENT — PATIENT HEALTH QUESTIONNAIRE - PHQ9
5. POOR APPETITE OR OVEREATING: NOT AT ALL
SUM OF ALL RESPONSES TO PHQ QUESTIONS 1-9: 2

## 2019-08-27 ASSESSMENT — ENCOUNTER SYMPTOMS
FEVER: 0
NERVOUS/ANXIOUS: 0
SINUS CONGESTION: 0
FATIGUE: 0
NECK PAIN: 1
DECREASED CONCENTRATION: 0
ALTERED TEMPERATURE REGULATION: 0
DEPRESSION: 0
CHILLS: 0
PANIC: 0
INSOMNIA: 0
DYSPNEA ON EXERTION: 0
COUGH: 0
POLYDIPSIA: 0
POLYPHAGIA: 0
TROUBLE SWALLOWING: 1

## 2019-08-27 ASSESSMENT — MIFFLIN-ST. JEOR: SCORE: 1141.53

## 2019-08-27 ASSESSMENT — ANXIETY QUESTIONNAIRES
5. BEING SO RESTLESS THAT IT IS HARD TO SIT STILL: NOT AT ALL
6. BECOMING EASILY ANNOYED OR IRRITABLE: NOT AT ALL
7. FEELING AFRAID AS IF SOMETHING AWFUL MIGHT HAPPEN: NOT AT ALL
GAD7 TOTAL SCORE: 3
IF YOU CHECKED OFF ANY PROBLEMS ON THIS QUESTIONNAIRE, HOW DIFFICULT HAVE THESE PROBLEMS MADE IT FOR YOU TO DO YOUR WORK, TAKE CARE OF THINGS AT HOME, OR GET ALONG WITH OTHER PEOPLE: NOT DIFFICULT AT ALL
2. NOT BEING ABLE TO STOP OR CONTROL WORRYING: SEVERAL DAYS
3. WORRYING TOO MUCH ABOUT DIFFERENT THINGS: SEVERAL DAYS
1. FEELING NERVOUS, ANXIOUS, OR ON EDGE: SEVERAL DAYS

## 2019-08-27 ASSESSMENT — PAIN SCALES - GENERAL: PAINLEVEL: NO PAIN (0)

## 2019-08-27 NOTE — PROGRESS NOTES
"HPI  Patient is here for evaluation of neck discomfort. She reports that she has had uncomfortable sensation at the base of her neck for several months. She denies pain. She denies voice changes. She occasionally feels tightness with swallowing. She describes it as a sensation of \"crowding\". She reports that the sensation is mostly staying the same. Occasionally it does not occur for the entire day. She denies fever, chills, night sweats, nausea or vomiting. She denies heartburn. She has not noticed wheezing or shortness of breath.     Review of Systems     Constitutional:  Negative for fever, chills and fatigue.   HENT:  Positive for trouble swallowing. Negative for hearing loss, mouth sores, dry mouth and sinus congestion.    Eyes:  Negative for decreased vision.   Respiratory:   Negative for cough and dyspnea on exertion.    Cardiovascular:  Negative for chest pain, dyspnea on exertion and edema.   Musculoskeletal:  Positive for neck pain.   Skin:  Negative for itching and rash.   Psychiatric/Behavioral:  Negative for depression, decreased concentration, mood swings and panic attacks.    Endocrine:  Negative for altered temperature regulation, polyphagia, polydipsia, unwanted hair growth and change in facial hair.    Current Outpatient Medications   Medication     aspirin 81 MG EC tablet     atorvastatin (LIPITOR) 40 MG tablet     B-D U/F insulin pen needle     blood glucose (FREESTYLE LITE) test strip     blood glucose (NO BRAND SPECIFIED) test strip     blood glucose monitoring (FREESTYLE) lancets     blood glucose monitoring (NO BRAND SPECIFIED) meter device kit     insulin glargine (LANTUS SOLOSTAR PEN) 100 UNIT/ML pen     insulin pen needle (B-D U/F) 31G X 8 MM miscellaneous     metFORMIN (GLUCOPHAGE-XR) 500 MG 24 hr tablet     FLUoxetine (PROZAC) 20 MG capsule     glucagon (GLUCAGON EMERGENCY) 1 MG kit     No current facility-administered medications for this visit.      Vitals:    08/27/19 0914 08/27/19 " "0918 08/27/19 0919 08/27/19 0920   BP: 112/60 111/72 108/72 110/68   Pulse: 60 60 60 60   Weight: 57.2 kg (126 lb)      Height: 1.626 m (5' 4\")        Physical Exam   Constitutional: She appears well-developed and well-nourished.   HENT:   Head: Normocephalic and atraumatic.   Right Ear: External ear normal.   Left Ear: External ear normal.   Nose: Nose normal.   Mouth/Throat: Oropharynx is clear and moist. No oropharyngeal exudate.   Eyes: Pupils are equal, round, and reactive to light. EOM are normal.   Neck: Normal range of motion. Neck supple. No JVD present. No tracheal deviation present. No thyromegaly present.   Cardiovascular: Normal rate, regular rhythm and normal heart sounds.   Pulmonary/Chest: Effort normal and breath sounds normal. No stridor. No respiratory distress. She has no wheezes. She has no rales. She exhibits no tenderness.   Musculoskeletal: Normal range of motion. She exhibits no edema.   Lymphadenopathy:     She has no cervical adenopathy.       Assessment and plan:    Krish was seen today for recheck.    Diagnoses and all orders for this visit:    Neck discomfort.  Patient physical exam today is normal.  However, given persistent discomfort, recommend further evaluation with a CT of soft tissue neck as well as consultation with ear nose throat specialist.  Referrals were made today.  Patient will be advised on her test results accordingly.  -     OTOLARYNGOLOGY REFERRAL  -     CT Soft Tissue Neck w/o & w Contrast; Future    Total time spent 25 minutes.  More than 50% of the time spent with Ms. Renee on counseling / coordinating her care    Ariane Varela MD              "

## 2019-08-27 NOTE — NURSING NOTE
Chief Complaint   Patient presents with     RECHECK     C/O tightness in neck/ thyroid area   Susan Ferro LPN

## 2019-08-27 NOTE — LETTER
"8/27/2019       RE: Krish Renee  [address]  [place] [address]     Dear Colleague,    Thank you for referring your patient, Krish Renee, to the [facility]  at [facility]. Please see a copy of my visit note below.    HPI  Patient is here for evaluation of neck discomfort. She reports that she has had uncomfortable sensation at the base of her neck for several months. She denies pain. She denies voice changes. She occasionally feels tightness with swallowing. She describes it as a sensation of \"crowding\". She reports that the sensation is mostly staying the same. Occasionally it does not occur for the entire day. She denies fever, chills, night sweats, nausea or vomiting. She denies heartburn. She has not noticed wheezing or shortness of breath.     Review of Systems     Constitutional:  Negative for fever, chills and fatigue.   HENT:  Positive for trouble swallowing. Negative for hearing loss, mouth sores, dry mouth and sinus congestion.    Eyes:  Negative for decreased vision.   Respiratory:   Negative for cough and dyspnea on exertion.    Cardiovascular:  Negative for chest pain, dyspnea on exertion and edema.   Musculoskeletal:  Positive for neck pain.   Skin:  Negative for itching and rash.   Psychiatric/Behavioral:  Negative for depression, decreased concentration, mood swings and panic attacks.    Endocrine:  Negative for altered temperature regulation, polyphagia, polydipsia, unwanted hair growth and change in facial hair.    Current Outpatient Medications   Medication     aspirin 81 MG EC tablet     atorvastatin (LIPITOR) 40 MG tablet     B-D U/F insulin pen needle     blood glucose (FREESTYLE LITE) test strip     blood glucose (NO BRAND SPECIFIED) test strip     blood glucose monitoring (FREESTYLE) lancets     blood glucose monitoring (NO BRAND SPECIFIED) meter device kit     insulin glargine (LANTUS SOLOSTAR PEN) 100 UNIT/ML pen     " "insulin pen needle (B-D U/F) 31G X 8 MM miscellaneous     metFORMIN (GLUCOPHAGE-XR) 500 MG 24 hr tablet     FLUoxetine (PROZAC) 20 MG capsule     glucagon (GLUCAGON EMERGENCY) 1 MG kit     No current facility-administered medications for this visit.      Vitals:    08/27/19 0914 08/27/19 0918 08/27/19 0919 08/27/19 0920   BP: 112/60 111/72 108/72 110/68   Pulse: 60 60 60 60   Weight: 57.2 kg (126 lb)      Height: 1.626 m (5' 4\")          Physical Exam   Constitutional: She appears well-developed and well-nourished.   HENT:   Head: Normocephalic and atraumatic.   Right Ear: External ear normal.   Left Ear: External ear normal.   Nose: Nose normal.   Mouth/Throat: Oropharynx is clear and moist. No oropharyngeal exudate.   Eyes: Pupils are equal, round, and reactive to light. EOM are normal.   Neck: Normal range of motion. Neck supple. No JVD present. No tracheal deviation present. No thyromegaly present.   Cardiovascular: Normal rate, regular rhythm and normal heart sounds.   Pulmonary/Chest: Effort normal and breath sounds normal. No stridor. No respiratory distress. She has no wheezes. She has no rales. She exhibits no tenderness.   Musculoskeletal: Normal range of motion. She exhibits no edema.   Lymphadenopathy:     She has no cervical adenopathy.       Assessment and plan:    Krish was seen today for recheck.    Diagnoses and all orders for this visit:      Neck discomfort.  Patient physical exam today is normal.  However, given persistent discomfort, recommend further evaluation with a CT of soft tissue neck as well as consultation with ear nose throat specialist.  Referrals were made today.  Patient will be advised on her test results accordingly.  -     OTOLARYNGOLOGY REFERRAL  -     CT Soft Tissue Neck w/o & w Contrast; Future  Total time spent 25 minutes.  More than 50% of the time spent with Ms. Renee on counseling / coordinating her care    Ariane Varela MD            "

## 2019-08-28 ENCOUNTER — HOSPITAL ENCOUNTER (OUTPATIENT)
Dept: CT IMAGING | Facility: CLINIC | Age: 59
Discharge: HOME | End: 2019-08-28
Attending: INTERNAL MEDICINE | Admitting: INTERNAL MEDICINE
Payer: COMMERCIAL

## 2019-08-28 DIAGNOSIS — M54.2 NECK DISCOMFORT: ICD-10-CM

## 2019-08-28 PROCEDURE — 25000128 H RX IP 250 OP 636: Performed by: INTERNAL MEDICINE

## 2019-08-28 PROCEDURE — 25000125 ZZHC RX 250: Performed by: INTERNAL MEDICINE

## 2019-08-28 PROCEDURE — 70491 CT SOFT TISSUE NECK W/DYE: CPT

## 2019-08-28 RX ORDER — IOPAMIDOL 755 MG/ML
100 INJECTION, SOLUTION INTRAVASCULAR ONCE
Status: COMPLETED | OUTPATIENT
Start: 2019-08-28 | End: 2019-08-28

## 2019-08-28 RX ADMIN — SODIUM CHLORIDE 80 ML: 9 INJECTION, SOLUTION INTRAVENOUS at 09:07

## 2019-08-28 RX ADMIN — IOPAMIDOL 98 ML: 755 INJECTION, SOLUTION INTRAVENOUS at 09:06

## 2019-08-28 ASSESSMENT — ANXIETY QUESTIONNAIRES: GAD7 TOTAL SCORE: 3

## 2019-09-30 ENCOUNTER — HEALTH MAINTENANCE LETTER (OUTPATIENT)
Age: 59
End: 2019-09-30

## 2019-11-20 ENCOUNTER — ANCILLARY PROCEDURE (OUTPATIENT)
Dept: MAMMOGRAPHY | Facility: CLINIC | Age: 59
End: 2019-11-20
Attending: INTERNAL MEDICINE
Payer: COMMERCIAL

## 2019-11-20 DIAGNOSIS — Z12.31 SCREENING MAMMOGRAM FOR HIGH-RISK PATIENT: ICD-10-CM

## 2019-12-15 ENCOUNTER — HEALTH MAINTENANCE LETTER (OUTPATIENT)
Age: 59
End: 2019-12-15

## 2020-03-22 ENCOUNTER — HEALTH MAINTENANCE LETTER (OUTPATIENT)
Age: 60
End: 2020-03-22

## 2020-07-16 DIAGNOSIS — E11.9 TYPE 2 DIABETES MELLITUS (H): Primary | ICD-10-CM

## 2020-07-16 RX ORDER — METFORMIN HCL 500 MG
2000 TABLET, EXTENDED RELEASE 24 HR ORAL
Qty: 360 TABLET | Refills: 0 | Status: SHIPPED | OUTPATIENT
Start: 2020-07-16 | End: 2020-10-14

## 2020-07-16 NOTE — TELEPHONE ENCOUNTER
metFORMIN (GLUCOPHAGE-XR) 500 MG 24 hr tablet  Last Written Prescription Date: 5/30/19  Last Fill Quantity: 360,   # refills: 3  Last Office Visit :5/29/19  Future Office visit: none    Scheduling has been notified to contact the pt for appointment.    Routed because:    Biguanide Agents Failed   Patient has documented A1c within the specified period of time. Protocol Details    Patient's CR is NOT>1.4 OR Patient's EGFR is NOT<45 within past 12 mos.     Recent (6 mo) or future (30 days) visit within the authorizing provider's specialty

## 2020-07-16 NOTE — TELEPHONE ENCOUNTER
[facility] Call Center    Phone Message    May a detailed message be left on voicemail: yes     Reason for Call: Medication Refill Request    Has the patient contacted the pharmacy for the refill? Yes   Name of medication being requested:metFORMIN (GLUCOPHAGE-XR) 500 MG 24 hr tablet       Provider who prescribed the medication: Dr Valdez      Pharmacy: [facility] - [address]       Date medication is needed: asap. [facility] day [facility]    Action Taken: Message routed to:  [facility] endo    Travel Screening: Not Applicable

## 2020-09-01 ENCOUNTER — TELEPHONE (OUTPATIENT)
Dept: OBGYN | Facility: CLINIC | Age: 60
End: 2020-09-01

## 2020-09-01 NOTE — TELEPHONE ENCOUNTER
Spoke with Krish who would like to have diabetic labs to see where everything is at.  Per [facility], her diabetic meds were last ordered by endocrine and she saw an internal medicine NP at [facility] in January.    Spoke with Dr. Varela who advised she would need an office visit as she was last in clinic 8/2019.    Gave this message to Krish. She reports that she only sees endocrinology once every few years and the NP at [facility] only manages depression. She would like to see Dr. Varela. Nurse agreed with plan and forwarded her to call center to schedule.

## 2020-09-01 NOTE — TELEPHONE ENCOUNTER
----- Message from Steph Stern sent at 9/1/2020  9:59 AM CDT -----  Regarding: orders  Contact: [phone]  Pt is hoping to get lab orders to check up on her diabetes, please call pt thanks

## 2020-09-15 ENCOUNTER — OFFICE VISIT (OUTPATIENT)
Dept: INTERNAL MEDICINE | Facility: CLINIC | Age: 60
End: 2020-09-15
Attending: INTERNAL MEDICINE
Payer: COMMERCIAL

## 2020-09-15 VITALS
HEART RATE: 77 BPM | SYSTOLIC BLOOD PRESSURE: 103 MMHG | BODY MASS INDEX: 21.28 KG/M2 | WEIGHT: 124 LBS | DIASTOLIC BLOOD PRESSURE: 69 MMHG

## 2020-09-15 DIAGNOSIS — Z79.4 TYPE 2 DIABETES MELLITUS WITHOUT COMPLICATION, WITH LONG-TERM CURRENT USE OF INSULIN (H): Primary | ICD-10-CM

## 2020-09-15 DIAGNOSIS — E11.9 TYPE 2 DIABETES MELLITUS WITHOUT COMPLICATION, WITH LONG-TERM CURRENT USE OF INSULIN (H): Primary | ICD-10-CM

## 2020-09-15 LAB
ALBUMIN SERPL-MCNC: 3.9 G/DL (ref 3.4–5)
ALP SERPL-CCNC: 71 U/L (ref 40–150)
ALT SERPL W P-5'-P-CCNC: 24 U/L (ref 0–50)
ANION GAP SERPL CALCULATED.3IONS-SCNC: 7 MMOL/L (ref 3–14)
AST SERPL W P-5'-P-CCNC: 18 U/L (ref 0–45)
BILIRUB SERPL-MCNC: 0.6 MG/DL (ref 0.2–1.3)
BUN SERPL-MCNC: 11 MG/DL (ref 7–30)
CALCIUM SERPL-MCNC: 9.3 MG/DL (ref 8.5–10.1)
CHLORIDE SERPL-SCNC: 103 MMOL/L (ref 94–109)
CHOLEST SERPL-MCNC: 128 MG/DL
CO2 SERPL-SCNC: 29 MMOL/L (ref 20–32)
CREAT SERPL-MCNC: 0.63 MG/DL (ref 0.52–1.04)
CREAT UR-MCNC: 117 MG/DL
GFR SERPL CREATININE-BSD FRML MDRD: >90 ML/MIN/{1.73_M2}
GLUCOSE SERPL-MCNC: 82 MG/DL (ref 70–99)
HBA1C MFR BLD: 6.3 % (ref 0–5.6)
HDLC SERPL-MCNC: 44 MG/DL
HIV 1+2 AB+HIV1 P24 AG SERPL QL IA: NONREACTIVE
LDLC SERPL CALC-MCNC: 65 MG/DL
MICROALBUMIN UR-MCNC: 14 MG/L
MICROALBUMIN/CREAT UR: 11.54 MG/G CR (ref 0–25)
NONHDLC SERPL-MCNC: 84 MG/DL
POTASSIUM SERPL-SCNC: 4.5 MMOL/L (ref 3.4–5.3)
PROT SERPL-MCNC: 7.8 G/DL (ref 6.8–8.8)
SODIUM SERPL-SCNC: 139 MMOL/L (ref 133–144)
TRIGL SERPL-MCNC: 97 MG/DL

## 2020-09-15 PROCEDURE — G0008 ADMIN INFLUENZA VIRUS VAC: HCPCS | Mod: ZF

## 2020-09-15 PROCEDURE — 90686 IIV4 VACC NO PRSV 0.5 ML IM: CPT | Mod: ZF

## 2020-09-15 PROCEDURE — G0463 HOSPITAL OUTPT CLINIC VISIT: HCPCS | Mod: 25,ZF

## 2020-09-15 PROCEDURE — 82043 UR ALBUMIN QUANTITATIVE: CPT | Performed by: INTERNAL MEDICINE

## 2020-09-15 PROCEDURE — 80053 COMPREHEN METABOLIC PANEL: CPT | Performed by: INTERNAL MEDICINE

## 2020-09-15 PROCEDURE — G0010 ADMIN HEPATITIS B VACCINE: HCPCS

## 2020-09-15 PROCEDURE — 87389 HIV-1 AG W/HIV-1&-2 AB AG IA: CPT | Performed by: INTERNAL MEDICINE

## 2020-09-15 PROCEDURE — 80061 LIPID PANEL: CPT | Performed by: INTERNAL MEDICINE

## 2020-09-15 PROCEDURE — 83036 HEMOGLOBIN GLYCOSYLATED A1C: CPT | Performed by: INTERNAL MEDICINE

## 2020-09-15 PROCEDURE — 25000128 H RX IP 250 OP 636: Mod: ZF

## 2020-09-15 PROCEDURE — 90472 IMMUNIZATION ADMIN EACH ADD: CPT | Mod: ZF

## 2020-09-15 PROCEDURE — 90746 HEPB VACCINE 3 DOSE ADULT IM: CPT | Mod: ZF

## 2020-09-15 ASSESSMENT — PAIN SCALES - GENERAL: PAINLEVEL: NO PAIN (0)

## 2020-09-15 NOTE — PROGRESS NOTES
HPI  Patient is here for follow up on diabetes. She reports that she has been feeling well. She denies hypoglycemic events. She has been working on increasing exercise. She denies chest pain, dyspnea on exertion or lower extremity edema.     Review of Systems     Constitutional:  Negative for fever and fatigue.   Eyes:  Negative for decreased vision.   Respiratory:   Negative for cough and dyspnea on exertion.    Cardiovascular:  Negative for chest pain, dyspnea on exertion and edema.   Gastrointestinal:  Negative for nausea, vomiting, abdominal pain, diarrhea, constipation, melena and bloating.   Genitourinary:  Negative for hematuria.   Musculoskeletal:  Negative for back pain, joint swelling, arthralgias and bone pain.   Skin:  Negative for itching and rash.   Endo/Heme:  Negative for anemia, swollen glands and bruises/bleeds easily.   Psychiatric/Behavioral:  Negative for depression, decreased concentration, mood swings and panic attacks.    Endocrine:  Negative for altered temperature regulation, polyphagia, polydipsia, unwanted hair growth and change in facial hair.       Current Outpatient Medications   Medication     aspirin 81 MG EC tablet     atorvastatin (LIPITOR) 40 MG tablet     B-D U/F insulin pen needle     blood glucose (FREESTYLE LITE) test strip     blood glucose (NO BRAND SPECIFIED) test strip     blood glucose monitoring (FREESTYLE) lancets     blood glucose monitoring (NO BRAND SPECIFIED) meter device kit     FLUoxetine (PROZAC) 20 MG capsule     insulin glargine (LANTUS SOLOSTAR PEN) 100 UNIT/ML pen     insulin pen needle (B-D U/F) 31G X 8 MM miscellaneous     metFORMIN (GLUCOPHAGE-XR) 500 MG 24 hr tablet     glucagon (GLUCAGON EMERGENCY) 1 MG kit     No current facility-administered medications for this visit.      Vitals:    09/15/20 0958   BP: 103/69   Pulse: 77   Weight: 56.2 kg (124 lb)       Physical Exam  Vitals signs and nursing note reviewed.   Constitutional:       Appearance: Normal  appearance.   HENT:      Head: Normocephalic and atraumatic.      Mouth/Throat:      Mouth: Mucous membranes are moist.   Eyes:      Pupils: Pupils are equal, round, and reactive to light.   Cardiovascular:      Pulses:           Dorsalis pedis pulses are 3+ on the right side and 3+ on the left side.        Posterior tibial pulses are 3+ on the right side and 3+ on the left side.      Heart sounds: Normal heart sounds.   Pulmonary:      Effort: Pulmonary effort is normal.      Breath sounds: Normal breath sounds.   Musculoskeletal:         General: No edema.      Right foot: Normal range of motion. No deformity, bunion, Charcot foot, foot drop or prominent metatarsal heads.      Left foot: Normal range of motion. No deformity, bunion, Charcot foot, foot drop or prominent metatarsal heads.   Feet:      Right foot:      Protective Sensation: 10 sites tested. 10 sites sensed.      Skin integrity: Skin integrity normal.      Left foot:      Protective Sensation: 10 sites tested. 10 sites sensed.      Skin integrity: Skin integrity normal.   Skin:     General: Skin is warm and dry.   Neurological:      General: No focal deficit present.      Mental Status: She is alert and oriented to person, place, and time.   Psychiatric:         Mood and Affect: Mood normal.         Behavior: Behavior normal.         Thought Content: Thought content normal.         Judgment: Judgment normal.       Assessment and Plan:  Krish was seen today for recheck.    Diagnoses and all orders for this visit:    Type 2 diabetes mellitus without complication, with long-term current use of insulin (H). Discussed diabetes management with patient. Recommend checking Hgb A1C today to determine the need for medication changes. Reviewed vaccinations with patient.   -     Comprehensive metabolic panel  -     Lipid Profile  -     Hemoglobin A1c  -     Albumin Random Urine Quantitative with Creat Ratio  -     HIV Antigen Antibody Combo  -     FLU VACCINE, 3  YRS +, IM (FLUZONE)  -     VACCINE ADMINISTRATION, INITIAL  -     EYE ADULT REFERRAL; Future    Other orders  -     HEPATITIS B VACCINE,ADULT,IM      Total time spent 25 minutes.  More than 50% of the time spent with Ms. Renee on counseling / coordinating her care    Ariane Varela MD

## 2020-09-15 NOTE — LETTER
9/15/2020       RE: Krish Renee  [address]  [facility] [place] [address]     Dear Colleague,    Thank you for referring your patient, rKish Renee, to the [facility]  at [facility]. Please see a copy of my visit note below.    HPI  Patient is here for follow up on diabetes. She reports that she has been feeling well. She denies hypoglycemic events. She has been working on increasing exercise. She denies chest pain, dyspnea on exertion or lower extremity edema.     Review of Systems     Constitutional:  Negative for fever and fatigue.   Eyes:  Negative for decreased vision.   Respiratory:   Negative for cough and dyspnea on exertion.    Cardiovascular:  Negative for chest pain, dyspnea on exertion and edema.   Gastrointestinal:  Negative for nausea, vomiting, abdominal pain, diarrhea, constipation, melena and bloating.   Genitourinary:  Negative for hematuria.   Musculoskeletal:  Negative for back pain, joint swelling, arthralgias and bone pain.   Skin:  Negative for itching and rash.   Endo/Heme:  Negative for anemia, swollen glands and bruises/bleeds easily.   Psychiatric/Behavioral:  Negative for depression, decreased concentration, mood swings and panic attacks.    Endocrine:  Negative for altered temperature regulation, polyphagia, polydipsia, unwanted hair growth and change in facial hair.       Current Outpatient Medications   Medication     aspirin 81 MG EC tablet     atorvastatin (LIPITOR) 40 MG tablet     B-D U/F insulin pen needle     blood glucose (FREESTYLE LITE) test strip     blood glucose (NO BRAND SPECIFIED) test strip     blood glucose monitoring (FREESTYLE) lancets     blood glucose monitoring (NO BRAND SPECIFIED) meter device kit     FLUoxetine (PROZAC) 20 MG capsule     insulin glargine (LANTUS SOLOSTAR PEN) 100 UNIT/ML pen     insulin pen needle (B-D U/F) 31G X 8 MM miscellaneous     metFORMIN (GLUCOPHAGE-XR) 500 MG 24 hr tablet      glucagon (GLUCAGON EMERGENCY) 1 MG kit     No current facility-administered medications for this visit.      Vitals:    09/15/20 0958   BP: 103/69   Pulse: 77   Weight: 56.2 kg (124 lb)       Physical Exam  Vitals signs and nursing note reviewed.   Constitutional:       Appearance: Normal appearance.   HENT:      Head: Normocephalic and atraumatic.      Mouth/Throat:      Mouth: Mucous membranes are moist.   Eyes:      Pupils: Pupils are equal, round, and reactive to light.   Cardiovascular:      Pulses:           Dorsalis pedis pulses are 3+ on the right side and 3+ on the left side.        Posterior tibial pulses are 3+ on the right side and 3+ on the left side.      Heart sounds: Normal heart sounds.   Pulmonary:      Effort: Pulmonary effort is normal.      Breath sounds: Normal breath sounds.   Musculoskeletal:         General: No edema.      Right foot: Normal range of motion. No deformity, bunion, Charcot foot, foot drop or prominent metatarsal heads.      Left foot: Normal range of motion. No deformity, bunion, Charcot foot, foot drop or prominent metatarsal heads.   Feet:      Right foot:      Protective Sensation: 10 sites tested. 10 sites sensed.      Skin integrity: Skin integrity normal.      Left foot:      Protective Sensation: 10 sites tested. 10 sites sensed.      Skin integrity: Skin integrity normal.   Skin:     General: Skin is warm and dry.   Neurological:      General: No focal deficit present.      Mental Status: She is alert and oriented to person, place, and time.   Psychiatric:         Mood and Affect: Mood normal.         Behavior: Behavior normal.         Thought Content: Thought content normal.         Judgment: Judgment normal.       Assessment and Plan:  Krish was seen today for recheck.    Diagnoses and all orders for this visit:    Type 2 diabetes mellitus without complication, with long-term current use of insulin (H). Discussed diabetes management with patient. Recommend checking  Hgb A1C today to determine the need for medication changes. Reviewed vaccinations with patient.   -     Comprehensive metabolic panel  -     Lipid Profile  -     Hemoglobin A1c  -     Albumin Random Urine Quantitative with Creat Ratio  -     HIV Antigen Antibody Combo  -     FLU VACCINE, 3 YRS +, IM (FLUZONE)  -     VACCINE ADMINISTRATION, INITIAL  -     EYE ADULT REFERRAL; Future    Other orders  -     HEPATITIS B VACCINE,ADULT,IM      Total time spent 25 minutes.  More than 50% of the time spent with Ms. Renee on counseling / coordinating her care    Ariane Varela MD

## 2020-09-19 ASSESSMENT — ENCOUNTER SYMPTOMS
DYSPNEA ON EXERTION: 0
ALTERED TEMPERATURE REGULATION: 0
HEMATURIA: 0
CONSTIPATION: 0
FATIGUE: 0
NAUSEA: 0
DEPRESSION: 0
ABDOMINAL PAIN: 0
FEVER: 0
PANIC: 0
SWOLLEN GLANDS: 0
DECREASED CONCENTRATION: 0
NERVOUS/ANXIOUS: 0
ARTHRALGIAS: 0
POLYPHAGIA: 0
DIARRHEA: 0
JOINT SWELLING: 0
BRUISES/BLEEDS EASILY: 0
POLYDIPSIA: 0
BACK PAIN: 0
INSOMNIA: 0
BLOATING: 0
VOMITING: 0
COUGH: 0

## 2020-10-14 DIAGNOSIS — E11.9 TYPE 2 DIABETES MELLITUS (H): ICD-10-CM

## 2020-10-14 RX ORDER — METFORMIN HCL 500 MG
2000 TABLET, EXTENDED RELEASE 24 HR ORAL
Qty: 360 TABLET | Refills: 1 | Status: SHIPPED | OUTPATIENT
Start: 2020-10-14 | End: 2021-07-06

## 2020-10-23 ENCOUNTER — OFFICE VISIT (OUTPATIENT)
Dept: OPHTHALMOLOGY | Facility: CLINIC | Age: 60
End: 2020-10-23
Payer: COMMERCIAL

## 2020-10-23 DIAGNOSIS — H52.13 MYOPIA OF BOTH EYES: ICD-10-CM

## 2020-10-23 DIAGNOSIS — E11.9 TYPE 2 DIABETES MELLITUS WITHOUT RETINOPATHY (H): Primary | ICD-10-CM

## 2020-10-23 PROCEDURE — 92014 COMPRE OPH EXAM EST PT 1/>: CPT | Performed by: OPTOMETRIST

## 2020-10-23 PROCEDURE — 92015 DETERMINE REFRACTIVE STATE: CPT | Performed by: OPTOMETRIST

## 2020-10-23 ASSESSMENT — REFRACTION_WEARINGRX
OD_CYLINDER: +0.50
OS_SPHERE: -1.75
OS_CYLINDER: SPHERE
OD_SPHERE: -2.00
SPECS_TYPE: SVL
OD_AXIS: 011

## 2020-10-23 ASSESSMENT — CUP TO DISC RATIO
OD_RATIO: 0.2
OS_RATIO: 0.2

## 2020-10-23 ASSESSMENT — TONOMETRY
OD_IOP_MMHG: 16
IOP_METHOD: ICARE
OS_IOP_MMHG: 17

## 2020-10-23 ASSESSMENT — VISUAL ACUITY
OD_CC: 20/20
CORRECTION_TYPE: GLASSES
METHOD: SNELLEN - LINEAR
OS_CC+: -2
OS_CC: 20/20
OD_CC+: -2

## 2020-10-23 ASSESSMENT — CONF VISUAL FIELD
METHOD: COUNTING FINGERS
OD_NORMAL: 1
OS_NORMAL: 1

## 2020-10-23 ASSESSMENT — REFRACTION_MANIFEST
OS_SPHERE: -2.25
OD_AXIS: 018
OD_SPHERE: -2.50
OS_AXIS: 146
OD_CYLINDER: +0.50
OS_ADD: +2.50
OD_ADD: +2.50
OS_CYLINDER: +0.25

## 2020-10-23 ASSESSMENT — SLIT LAMP EXAM - LIDS
COMMENTS: NORMAL
COMMENTS: NORMAL

## 2020-10-23 ASSESSMENT — EXTERNAL EXAM - RIGHT EYE: OD_EXAM: NORMAL

## 2020-10-23 ASSESSMENT — EXTERNAL EXAM - LEFT EYE: OS_EXAM: NORMAL

## 2020-10-23 NOTE — PROGRESS NOTES
Assessment/Plan  (E11.9) Type 2 diabetes mellitus without retinopathy (H)  (primary encounter diagnosis)  Comment: Patient reports only intermittent glucose testing  Plan: Discussed findings with patient. Encouraged continued blood glucose, pressure, and lipid control. Patient should continue following recommendations of primary care provider. Patient should plan on returning to clinic annually for a dilated eye exam but was encouraged to return to clinic sooner with new flashes/floaters or other vision changes. Encouraged patient to contact primary care provider regarding her decrease in testing. While patient's glucose numbers suggest stability, patient was advised that more regular testing is recommended to mitigate risks of high and low numbers.     (H52.13) Myopia of both eyes  Plan: REFRACTION [[id number]]        Discussed findings with patient. New spectacle prescription dispensed to patient. Patient is welcome to return to clinic with prolonged adaptation difficulties.     Complete documentation of historical and exam elements from today's encounter can  be found in the full encounter summary report (not reduplicated in this progress  note). I personally obtained the chief complaint(s) and history of present illness. I  confirmed and edited as necessary the review of systems, past medical/surgical  history, family history, social history, and examination findings as documented by  others; and I examined the patient myself. I personally reviewed the relevant tests,  images, and reports as documented above. I formulated and edited as necessary the  assessment and plan and discussed the findings and management plan with the  patient and family.    Elia Jaffe OD

## 2020-10-23 NOTE — NURSING NOTE
Chief Complaints and History of Present Illnesses   Patient presents with     Diabetic Eye Exam     Chief Complaint(s) and History of Present Illness(es)     Diabetic Eye Exam     Vision: is stable    Associated symptoms: Negative for flashes, floaters, discharge and tearing    Diabetes Type: Type 2    Blood Sugars: is controlled    Pain scale: 0/10              Comments     Pt denies any significant vision changes in either eye since last visit.  Denies any flashes, floaters, pain, pressure, irritation, discharge, and tearing.  Ocular Meds: None  Type 2 DM- does not check BS daily  Lab Results       Component                Value               Date                       A1C                      6.3                 09/15/2020                 A1C                      6.3                 12/07/2018                 A1C                      6.6                 05/15/2018                 A1C                      6.1                 09/25/2017                 A1C                      6.4                 04/04/2017              Ayla Guy OT 2:30 PM October 23, 2020

## 2020-12-07 ENCOUNTER — MYC MEDICAL ADVICE (OUTPATIENT)
Dept: INTERNAL MEDICINE | Facility: CLINIC | Age: 60
End: 2020-12-07

## 2020-12-07 DIAGNOSIS — E78.00 HYPERCHOLESTEREMIA: ICD-10-CM

## 2020-12-08 RX ORDER — ATORVASTATIN CALCIUM 40 MG/1
40 TABLET, FILM COATED ORAL DAILY
Qty: 90 TABLET | Refills: 3 | Status: SHIPPED | OUTPATIENT
Start: 2020-12-08 | End: 2021-12-06

## 2020-12-17 ENCOUNTER — ANCILLARY PROCEDURE (OUTPATIENT)
Dept: MAMMOGRAPHY | Facility: CLINIC | Age: 60
End: 2020-12-17
Attending: INTERNAL MEDICINE
Payer: COMMERCIAL

## 2020-12-17 DIAGNOSIS — Z12.31 VISIT FOR SCREENING MAMMOGRAM: ICD-10-CM

## 2020-12-17 PROCEDURE — 77063 BREAST TOMOSYNTHESIS BI: CPT | Performed by: RADIOLOGY

## 2020-12-17 PROCEDURE — 77067 SCR MAMMO BI INCL CAD: CPT | Performed by: RADIOLOGY

## 2021-01-07 NOTE — TELEPHONE ENCOUNTER
RECORDS RECEIVED FROM: (B) Possible foot fungi , per pr , [facility]    DATE RECEIVED: Jan 25, 2021     NOTES STATUS DETAILS   OFFICE NOTE from referring provider Internal    OFFICE NOTE from other specialist N/A    DISCHARGE SUMMARY from hospital N/A    DISCHARGE REPORT from the ER N/A    OPERATIVE REPORT N/A    MEDICATION LIST Internal    IMPLANT RECORD/STICKER N/A    LABS     CBC/DIFF N/A    CULTURES N/A    INJECTIONS DONE IN RADIOLOGY N/A    MRI N/A    CT SCAN N/A    XRAYS (IMAGES & REPORTS) N/A    TUMOR     PATHOLOGY  Slides & report N/A    01/07/21   11:36 AM   Complete  Tish Wilson CMA

## 2021-01-15 ENCOUNTER — HEALTH MAINTENANCE LETTER (OUTPATIENT)
Age: 61
End: 2021-01-15

## 2021-01-25 ENCOUNTER — PRE VISIT (OUTPATIENT)
Dept: ORTHOPEDICS | Facility: CLINIC | Age: 61
End: 2021-01-25

## 2021-01-25 ENCOUNTER — OFFICE VISIT (OUTPATIENT)
Dept: ORTHOPEDICS | Facility: CLINIC | Age: 61
End: 2021-01-25
Payer: COMMERCIAL

## 2021-01-25 DIAGNOSIS — B35.1 ONYCHOMYCOSIS OF MULTIPLE TOENAILS WITH TYPE 2 DIABETES MELLITUS (H): ICD-10-CM

## 2021-01-25 DIAGNOSIS — E11.69 ONYCHOMYCOSIS OF MULTIPLE TOENAILS WITH TYPE 2 DIABETES MELLITUS (H): ICD-10-CM

## 2021-01-25 DIAGNOSIS — B35.3 TINEA PEDIS OF BOTH FEET: Primary | ICD-10-CM

## 2021-01-25 PROCEDURE — 99204 OFFICE O/P NEW MOD 45 MIN: CPT | Performed by: PODIATRIST

## 2021-01-25 RX ORDER — ECONAZOLE NITRATE 10 MG/G
CREAM TOPICAL DAILY
Qty: 510 G | Refills: 1 | Status: SHIPPED | OUTPATIENT
Start: 2021-01-25

## 2021-01-25 NOTE — PROGRESS NOTES
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     Hyperlipidemia LDL goal <160     Ectopic breast tissue     Abdominal pain, right lower quadrant     Periocular dermatitis     Diabetes mellitus, type 2 (H)     ELEONORA (obstructive sleep apnea) Mild     Past Surgical History:   Procedure Laterality Date     PANCREATECTOMY PARTIAL  2006     [facility] NONSPECIFIC PROCEDURE  1994    C/S     [facility] NONSPECIFIC PROCEDURE  Nov 2006    splenectomy/distal pancreatectomy at [facility] for benign tumor     [facility] NONSPECIFIC PROCEDURE  1982    wisdom teeth removed     Social History     Socioeconomic History     Marital status:      Spouse name: Eldon Renee     Number of children: 2     Years of education: 18     Highest education level: Not on file   Occupational History     Occupation:      Employer: [employer]   Social Needs     Financial resource strain: Not on file     Food insecurity     Worry: Not on file     Inability: Not on file     Transportation needs     Medical: Not on file     Non-medical: Not on file   Tobacco Use     Smoking status: Never Smoker     Smokeless tobacco: Never Used   Substance and Sexual Activity     Alcohol use: No     Drug use: No     Sexual activity: Yes     Partners: Male     Birth control/protection: Male Surgical     Comment: vastectomy   Lifestyle     Physical activity     Days per week: Not on file     Minutes per session: Not on file     Stress: Not on file   Relationships     Social connections     Talks on phone: Not on  file     Gets together: Not on file     Attends Temple service: Not on file     Active member of club or organization: Not on file     Attends meetings of clubs or organizations: Not on file     Relationship status: Not on file     Intimate partner violence     Fear of current or ex partner: Not on file     Emotionally abused: Not on file     Physically abused: Not on file     Forced sexual activity: Not on file   Other Topics Concern     Parent/sibling w/ CABG, MI or angioplasty before 65F 55M? Not Asked   Social History Narrative     Not on file     Family History   Problem Relation Age of Onset     Depression Mother      Circulatory Mother         blood clots     Arthritis Mother      Crohn's Disease Mother         s/p ileostomy     Colon Cancer Mother         stage II     Diabetes Father      Hypertension Father      Cerebrovascular Disease Father      Depression Father      Alcohol/Drug Father      Lipids Father      Cancer - colorectal Maternal Grandmother      C.A.D. Maternal Grandfather      Diabetes Paternal Grandmother      Cerebrovascular Disease Paternal Grandmother      Depression Brother      Alcoholism Brother      Substance Abuse Brother      Allergies Sister      Depression Sister      Sleep Apnea Sister      Depression Son      Anxiety Disorder Son      Cancer Maternal Aunt         uterine     Cancer Brother         non hodgkins lymphoma, and hemolitic anemia     Anemia Brother         hemolytic anemia     No Known Problems Daughter      Melanoma No family hx of      Skin Cancer No family hx of      Glaucoma No family hx of      Macular Degeneration No family hx of      Lab Results   Component Value Date    A1C 6.3 09/15/2020    A1C 6.3 12/07/2018    A1C 6.6 05/15/2018    A1C 6.1 09/25/2017    A1C 6.4 04/04/2017     Last Comprehensive Metabolic Panel:  Sodium   Date Value Ref Range Status   09/15/2020 139 133 - 144 mmol/L Final     Potassium   Date Value Ref Range Status   09/15/2020 4.5 3.4 -  5.3 mmol/L Final     Chloride   Date Value Ref Range Status   09/15/2020 103 94 - 109 mmol/L Final     Carbon Dioxide   Date Value Ref Range Status   09/15/2020 29 20 - 32 mmol/L Final     Anion Gap   Date Value Ref Range Status   09/15/2020 7 3 - 14 mmol/L Final     Glucose   Date Value Ref Range Status   09/15/2020 82 70 - 99 mg/dL Final     Urea Nitrogen   Date Value Ref Range Status   09/15/2020 11 7 - 30 mg/dL Final     Creatinine   Date Value Ref Range Status   09/15/2020 0.63 0.52 - 1.04 mg/dL Final     GFR Estimate   Date Value Ref Range Status   09/15/2020 >90 >60 mL/min/[1.73_m2] Final     Comment:     Non  GFR Calc  Starting 12/18/2018, serum creatinine based estimated GFR (eGFR) will be   calculated using the Chronic Kidney Disease Epidemiology Collaboration   (CKD-EPI) equation.       Calcium   Date Value Ref Range Status   09/15/2020 9.3 8.5 - 10.1 mg/dL Final     Lab Results   Component Value Date    AST 18 09/15/2020     Lab Results   Component Value Date    ALT 24 09/15/2020     Lab Results   Component Value Date    BILICONJ 0.0 05/30/2007      Lab Results   Component Value Date    BILITOTAL 0.6 09/15/2020     Lab Results   Component Value Date    ALBUMIN 3.9 09/15/2020     Lab Results   Component Value Date    PROTTOTAL 7.8 09/15/2020      Lab Results   Component Value Date    ALKPHOS 71 09/15/2020     SUBJECTIVE FINDINGS:  60-year-old female presents because her daughter said she needed to get her toenails checked.  She relates they do not bother her but they are discolored and kind of thickened.  It has been going on for quite some time.  She relates no treatment, no injuries.  She is diabetic.  No numbness, tingling or neuropathy in her feet.  No history of ulcers or sores.  Relates she has some plantar warts on her first MPJ that she has had for a long time and she has not found anything to get rid of them.  She has tried some over-the-counter treatments.      OBJECTIVE  FINDINGS:  DP and PT are 2/4 bilaterally.  She has dorsally contracted digits 2-5 bilaterally.  She has hyperkeratotic tissue buildup with pinpoint ecchymosis lesions in the plantar first MPJ, left foot.  She has dry, scaly skin in the heels bilaterally.  This is mild.  She has mild dystrophy, thickening, subungual debris and dystrophy of the nails, mostly 1 and 5 bilaterally, with some dry scaly skin on the ends of the toes.  Sharp/dull intact with 5.07 Eugene-Esther monofilament bilaterally.  No gross tendon voids bilaterally.  Deep tendon reflexes intact bilaterally.  No pain on palpation bilaterally.  No erythema, no edema, no drainage, no odor, no calor bilaterally.      ASSESSMENT/PLAN:  Onychomycosis bilaterally.  She does have some mild tinea pedis changes.  She has a plantar wart on first MPJ, left.  Diagnosis and treatment options discussed with her.  Prescription for econazole cream given and use discussed with her.  Advised her on vinegar soaks.  She will return to clinic and see me in 3-4 months.     Tinea pedis changes on the heels and distal toes bilaterally.   She opted for no treatment for the plantar warts today.           Answers for HPI/ROS submitted by the patient on 1/19/2021   General Symptoms: No  Skin Symptoms: No  HENT Symptoms: No  EYE SYMPTOMS: No  HEART SYMPTOMS: No  LUNG SYMPTOMS: No  INTESTINAL SYMPTOMS: No  URINARY SYMPTOMS: No  GYNECOLOGIC SYMPTOMS: No  BREAST SYMPTOMS: No  SKELETAL SYMPTOMS: No  BLOOD SYMPTOMS: No  NERVOUS SYSTEM SYMPTOMS: No  MENTAL HEALTH SYMPTOMS: No

## 2021-01-25 NOTE — LETTER
1/25/2021         RE: Krish Renee  [address]  [facility] [place] [address]        Dear Colleague,    Thank you for referring your patient, Krish Renee, to the [facility] [place]. Please see a copy of my visit note below.    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     Hyperlipidemia LDL goal <160     Ectopic breast tissue     Abdominal pain, right lower quadrant     Periocular dermatitis     Diabetes mellitus, type 2 (H)     ELEONORA (obstructive sleep apnea) Mild     Past Surgical History:   Procedure Laterality Date     PANCREATECTOMY PARTIAL  2006     [facility] NONSPECIFIC PROCEDURE  1994    C/S     [facility] NONSPECIFIC PROCEDURE  Nov 2006    splenectomy/distal pancreatectomy at [facility] for benign tumor     [facility] NONSPECIFIC PROCEDURE  1982    wisdom teeth removed     Social History     Socioeconomic History     Marital status:      Spouse name: Eldon Renee     Number of children: 2     Years of education: 18     Highest education level: Not on file   Occupational History     Occupation:      Employer: [employer]   Social Needs     Financial resource strain: Not on file     Food insecurity     Worry: Not on file     Inability: Not on file     Transportation needs     Medical: Not on file     Non-medical: Not on file   Tobacco Use     Smoking status: Never Smoker     Smokeless tobacco: Never Used   Substance and Sexual Activity     Alcohol use: No     Drug use: No     Sexual activity: Yes     Partners: Male      Birth control/protection: Male Surgical     Comment: vastectomy   Lifestyle     Physical activity     Days per week: Not on file     Minutes per session: Not on file     Stress: Not on file   Relationships     Social connections     Talks on phone: Not on file     Gets together: Not on file     Attends Restorationist service: Not on file     Active member of club or organization: Not on file     Attends meetings of clubs or organizations: Not on file     Relationship status: Not on file     Intimate partner violence     Fear of current or ex partner: Not on file     Emotionally abused: Not on file     Physically abused: Not on file     Forced sexual activity: Not on file   Other Topics Concern     Parent/sibling w/ CABG, MI or angioplasty before 65F 55M? Not Asked   Social History Narrative     Not on file     Family History   Problem Relation Age of Onset     Depression Mother      Circulatory Mother         blood clots     Arthritis Mother      Crohn's Disease Mother         s/p ileostomy     Colon Cancer Mother         stage II     Diabetes Father      Hypertension Father      Cerebrovascular Disease Father      Depression Father      Alcohol/Drug Father      Lipids Father      Cancer - colorectal Maternal Grandmother      C.A.D. Maternal Grandfather      Diabetes Paternal Grandmother      Cerebrovascular Disease Paternal Grandmother      Depression Brother      Alcoholism Brother      Substance Abuse Brother      Allergies Sister      Depression Sister      Sleep Apnea Sister      Depression Son      Anxiety Disorder Son      Cancer Maternal Aunt         uterine     Cancer Brother         non hodgkins lymphoma, and hemolitic anemia     Anemia Brother         hemolytic anemia     No Known Problems Daughter      Melanoma No family hx of      Skin Cancer No family hx of      Glaucoma No family hx of      Macular Degeneration No family hx of      Lab Results   Component Value Date    A1C 6.3 09/15/2020    A1C 6.3  12/07/2018    A1C 6.6 05/15/2018    A1C 6.1 09/25/2017    A1C 6.4 04/04/2017     Last Comprehensive Metabolic Panel:  Sodium   Date Value Ref Range Status   09/15/2020 139 133 - 144 mmol/L Final     Potassium   Date Value Ref Range Status   09/15/2020 4.5 3.4 - 5.3 mmol/L Final     Chloride   Date Value Ref Range Status   09/15/2020 103 94 - 109 mmol/L Final     Carbon Dioxide   Date Value Ref Range Status   09/15/2020 29 20 - 32 mmol/L Final     Anion Gap   Date Value Ref Range Status   09/15/2020 7 3 - 14 mmol/L Final     Glucose   Date Value Ref Range Status   09/15/2020 82 70 - 99 mg/dL Final     Urea Nitrogen   Date Value Ref Range Status   09/15/2020 11 7 - 30 mg/dL Final     Creatinine   Date Value Ref Range Status   09/15/2020 0.63 0.52 - 1.04 mg/dL Final     GFR Estimate   Date Value Ref Range Status   09/15/2020 >90 >60 mL/min/[1.73_m2] Final     Comment:     Non  GFR Calc  Starting 12/18/2018, serum creatinine based estimated GFR (eGFR) will be   calculated using the Chronic Kidney Disease Epidemiology Collaboration   (CKD-EPI) equation.       Calcium   Date Value Ref Range Status   09/15/2020 9.3 8.5 - 10.1 mg/dL Final     Lab Results   Component Value Date    AST 18 09/15/2020     Lab Results   Component Value Date    ALT 24 09/15/2020     Lab Results   Component Value Date    BILICONJ 0.0 05/30/2007      Lab Results   Component Value Date    BILITOTAL 0.6 09/15/2020     Lab Results   Component Value Date    ALBUMIN 3.9 09/15/2020     Lab Results   Component Value Date    PROTTOTAL 7.8 09/15/2020      Lab Results   Component Value Date    ALKPHOS 71 09/15/2020     SUBJECTIVE FINDINGS:  60-year-old female presents because her daughter said she needed to get her toenails checked.  She relates they do not bother her but they are discolored and kind of thickened.  It has been going on for quite some time.  She relates no treatment, no injuries.  She is diabetic.  No numbness, tingling or  neuropathy in her feet.  No history of ulcers or sores.  Relates she has some plantar warts on her first MPJ that she has had for a long time and she has not found anything to get rid of them.  She has tried some over-the-counter treatments.      OBJECTIVE FINDINGS:  DP and PT are 2/4 bilaterally.  She has dorsally contracted digits 2-5 bilaterally.  She has hyperkeratotic tissue buildup with pinpoint ecchymosis lesions in the plantar first MPJ, left foot.  She has dry, scaly skin in the heels bilaterally.  This is mild.  She has mild dystrophy, thickening, subungual debris and dystrophy of the nails, mostly 1 and 5 bilaterally, with some dry scaly skin on the ends of the toes.  Sharp/dull intact with 5.07 [place] monofilament bilaterally.  No gross tendon voids bilaterally.  Deep tendon reflexes intact bilaterally.  No pain on palpation bilaterally.  No erythema, no edema, no drainage, no odor, no calor bilaterally.      ASSESSMENT/PLAN:  Onychomycosis bilaterally.  She does have some mild tinea pedis changes.  She has a plantar wart on first MPJ, left.  Diagnosis and treatment options discussed with her.  Prescription for econazole cream given and use discussed with her.  Advised her on vinegar soaks.  She will return to clinic and see me in 3-4 months.     Tinea pedis changes on the heels and distal toes bilaterally.   She opted for no treatment for the plantar warts today.           Answers for HPI/ROS submitted by the patient on 1/19/2021   General Symptoms: No  Skin Symptoms: No  HENT Symptoms: No  EYE SYMPTOMS: No  HEART SYMPTOMS: No  LUNG SYMPTOMS: No  INTESTINAL SYMPTOMS: No  URINARY SYMPTOMS: No  GYNECOLOGIC SYMPTOMS: No  BREAST SYMPTOMS: No  SKELETAL SYMPTOMS: No  BLOOD SYMPTOMS: No  NERVOUS SYSTEM SYMPTOMS: No  MENTAL HEALTH SYMPTOMS: No        Again, thank you for allowing me to participate in the care of your patient.        Sincerely,        Fabián Mera DPM

## 2021-03-30 ENCOUNTER — IMMUNIZATION (OUTPATIENT)
Dept: NURSING | Facility: CLINIC | Age: 61
End: 2021-03-30
Payer: COMMERCIAL

## 2021-03-30 PROCEDURE — 91300 PR COVID VAC PFIZER DIL RECON 30 MCG/0.3 ML IM: CPT

## 2021-03-30 PROCEDURE — 0001A PR COVID VAC PFIZER DIL RECON 30 MCG/0.3 ML IM: CPT

## 2021-04-20 ENCOUNTER — IMMUNIZATION (OUTPATIENT)
Dept: NURSING | Facility: CLINIC | Age: 61
End: 2021-04-20
Attending: INTERNAL MEDICINE
Payer: COMMERCIAL

## 2021-04-20 PROCEDURE — 0002A PR COVID VAC PFIZER DIL RECON 30 MCG/0.3 ML IM: CPT

## 2021-04-20 PROCEDURE — 91300 PR COVID VAC PFIZER DIL RECON 30 MCG/0.3 ML IM: CPT

## 2021-05-09 ENCOUNTER — HEALTH MAINTENANCE LETTER (OUTPATIENT)
Age: 61
End: 2021-05-09

## 2021-06-21 DIAGNOSIS — E11.9 TYPE 2 DIABETES MELLITUS WITHOUT COMPLICATION, UNSPECIFIED WHETHER LONG TERM INSULIN USE (H): ICD-10-CM

## 2021-06-21 PROCEDURE — 36415 COLL VENOUS BLD VENIPUNCTURE: CPT | Performed by: PATHOLOGY

## 2021-06-21 PROCEDURE — 83036 HEMOGLOBIN GLYCOSYLATED A1C: CPT | Performed by: PATHOLOGY

## 2021-06-24 ENCOUNTER — TELEPHONE (OUTPATIENT)
Dept: TRANSPLANT | Facility: CLINIC | Age: 61
End: 2021-06-24

## 2021-06-24 DIAGNOSIS — E11.9 TYPE 2 DIABETES MELLITUS WITHOUT COMPLICATION, UNSPECIFIED WHETHER LONG TERM INSULIN USE (H): Primary | ICD-10-CM

## 2021-06-24 LAB — HBA1C MFR BLD: 6.7 % (ref 0–5.6)

## 2021-06-24 NOTE — TELEPHONE ENCOUNTER
----- Message from Major Walters sent at 6/23/2021  6:32 PM CDT -----  Regarding: a1c  Dr. Varela,    We are still waiting for A1c order for Krish.    Please advise.    Thank you.  Major   [phone].

## 2021-07-06 DIAGNOSIS — E11.9 TYPE 2 DIABETES MELLITUS (H): ICD-10-CM

## 2021-07-06 RX ORDER — METFORMIN HCL 500 MG
2000 TABLET, EXTENDED RELEASE 24 HR ORAL
Qty: 360 TABLET | Refills: 1 | Status: SHIPPED | OUTPATIENT
Start: 2021-07-06 | End: 2021-12-16

## 2021-07-06 NOTE — TELEPHONE ENCOUNTER
Refill request has been received for Metformin, patient recently had A1C drawn and to continue on this regimen per . Sent per protocol.

## 2021-08-02 ENCOUNTER — MYC MEDICAL ADVICE (OUTPATIENT)
Dept: INTERNAL MEDICINE | Facility: CLINIC | Age: 61
End: 2021-08-02

## 2021-08-02 ENCOUNTER — OFFICE VISIT (OUTPATIENT)
Dept: AUDIOLOGY | Facility: CLINIC | Age: 61
End: 2021-08-02
Attending: INTERNAL MEDICINE
Payer: COMMERCIAL

## 2021-08-02 DIAGNOSIS — H90.6 MIXED CONDUCTIVE AND SENSORINEURAL HEARING LOSS OF BOTH EARS: ICD-10-CM

## 2021-08-02 DIAGNOSIS — E11.9 TYPE 2 DIABETES MELLITUS WITHOUT COMPLICATION, UNSPECIFIED WHETHER LONG TERM INSULIN USE (H): Primary | ICD-10-CM

## 2021-08-02 DIAGNOSIS — H90.3 SENSORINEURAL HEARING LOSS (SNHL), BILATERAL: Primary | ICD-10-CM

## 2021-08-02 PROCEDURE — 92550 TYMPANOMETRY & REFLEX THRESH: CPT | Performed by: AUDIOLOGIST

## 2021-08-02 PROCEDURE — 92557 COMPREHENSIVE HEARING TEST: CPT | Performed by: AUDIOLOGIST

## 2021-08-02 NOTE — PROGRESS NOTES
AUDIOLOGY REPORT    SUBJECTIVE:  Krish Renee is a 60 year old female who was seen in Audiology at the [facility] [place] for audiologic evaluation, referred by Ariane Varela.  The patient reports a long-standing asymmetry for hearing due to a severe left-ear infection at [age] years of age causing permanent left hearing loss. The patient reports a suspected further (gradual) decline in hearing bilaterally over the past year but she confirms that the left-ear hearing is still poorer than the right. The patient also reports issues with veering left and notes a general decline in balance over approximately the last year. The patient reports that her mother had balance issues and that her father developed bilateral hearing loss and was fit with binaural hearing amplification in his late sixties. The patient denies other ear related issues, vertigo, history of chronic ear issues or ear surgeries, history of noise exposure or history of head trauma.     OBJECTIVE:  Fall Risk Screen:  1. Have you fallen two or more times in the past year? No  2. Have you fallen and had an injury in the past year? No    Abuse Screening:  Do you feel unsafe at home or work/school? No  Do you feel threatened by someone? No  Does anyone try to keep you from having contact with others, or doing things outside of your home? No  Physical signs of abuse present? No    Otoscopic exam indicates excessive cerumen in the left ear removed with lighted curette without incident prior to assessment.    Pure Tone Thresholds assessed using conventional audiometry with good  reliability from 250-8000 Hz bilaterally using circumaural headphones and reassessed using insert earphones    RIGHT:  Normal sloping to moderately severe sensorineural hearing loss     LEFT:    Normal sloping to moderately severe sensorineural hearing loss     Tympanogram:    RIGHT: normal eardrum mobility    LEFT:   normal eardrum  "mobility    Reflexes (reported by stimulus ear):  RIGHT: Ipsilateral is present at normal levels  RIGHT: Contralateral is present at normal levels  LEFT:   Ipsilateral is present at normal levels  LEFT:   Contralateral is present at normal levels    Speech Reception Threshold:    RIGHT: 15 dB HL    LEFT:   20 dB HL  Word Recognition Score:     RIGHT: 100% at 55 dB HL using NU-6 recorded word list.    LEFT:   100% at 55 dB HL using NU-6 recorded word list.    ASSESSMENT:  Symmetric normal sloping to moderately severe sensorineural hearing loss bilaterally. Today s results were discussed with the patient in detail.     PLAN:    Patient was counseled regarding hearing loss and impact on communication.  Patient is a borderline candidate for hearing amplification at this time. It is recommended that the patient consider further consultation with ear, nose and throat due to reported \"veering\" and decrease in balance if the patient feels concerned about this recent symptoms.  Please call this clinic with questions regarding these results or recommendations.      Brock Muhammad.  Licensed Audiologist  [place] #[id number]                    "

## 2021-08-06 ENCOUNTER — MYC MEDICAL ADVICE (OUTPATIENT)
Dept: INTERNAL MEDICINE | Facility: CLINIC | Age: 61
End: 2021-08-06

## 2021-08-06 DIAGNOSIS — Z87.898 HISTORY OF MOTION SICKNESS: Primary | ICD-10-CM

## 2021-08-09 DIAGNOSIS — Z87.898 HISTORY OF MOTION SICKNESS: ICD-10-CM

## 2021-08-09 RX ORDER — SCOLOPAMINE TRANSDERMAL SYSTEM 1 MG/1
1 PATCH, EXTENDED RELEASE TRANSDERMAL
Qty: 3 PATCH | Refills: 0 | Status: SHIPPED | OUTPATIENT
Start: 2021-08-09 | End: 2021-08-12

## 2021-08-12 RX ORDER — MECLIZINE HCL 12.5 MG 12.5 MG/1
12.5 TABLET ORAL 3 TIMES DAILY PRN
Qty: 10 TABLET | Refills: 0 | Status: SHIPPED | OUTPATIENT
Start: 2021-08-12 | End: 2021-12-03

## 2021-08-12 NOTE — TELEPHONE ENCOUNTER
scopolamine (TRANSDERM) 1 MG/3DAYS 72 hr patch      Last Written Prescription Date:  8-9-21  Last Fill Quantity: 3 patches ,   # refills: 0  Last Office Visit : 9-15-21  Future Office visit:  none    Routing refill request to provider for review/approval because:  Med not covered by insurance  Pharm requesting alternative: meclizine (ANTIVERT) 12.5 MG tablet

## 2021-08-13 ENCOUNTER — TELEPHONE (OUTPATIENT)
Dept: INTERNAL MEDICINE | Facility: CLINIC | Age: 61
End: 2021-08-13

## 2021-08-13 ENCOUNTER — MYC MEDICAL ADVICE (OUTPATIENT)
Dept: INTERNAL MEDICINE | Facility: CLINIC | Age: 61
End: 2021-08-13

## 2021-08-13 DIAGNOSIS — Z87.898 HISTORY OF MOTION SICKNESS: Primary | ICD-10-CM

## 2021-08-13 DIAGNOSIS — Z87.898 HISTORY OF MOTION SICKNESS: ICD-10-CM

## 2021-08-13 RX ORDER — SCOLOPAMINE TRANSDERMAL SYSTEM 1 MG/1
1 PATCH, EXTENDED RELEASE TRANSDERMAL
Qty: 10 PATCH | Refills: 1 | Status: SHIPPED | OUTPATIENT
Start: 2021-08-13 | End: 2021-12-03

## 2021-08-13 RX ORDER — MECLIZINE HCL 12.5 MG 12.5 MG/1
TABLET ORAL
Qty: 1 TABLET | Refills: 0 | OUTPATIENT
Start: 2021-08-13

## 2021-08-13 NOTE — TELEPHONE ENCOUNTER
Request received to send prescription for scopolamine patches. Pt is aware that insurance does not cover.     Order pended and routed to Dr. Varela to approve.

## 2021-08-13 NOTE — TELEPHONE ENCOUNTER
Message received from patient stating that Lantus is not covered and alternative requested. Levemir ordered and sent to [facility]. Message received from patient requesting it be sent to [facility]. Order changed and message sent to patient informing her this was done.

## 2021-08-13 NOTE — TELEPHONE ENCOUNTER
Question set received and was placed in the [facility] FOLDER for completion     Prior Authorization Retail Medication Request    Medication/Dose: insulin detemir (LEVEMIR PEN) 100 UNIT/ML pen  ICD code (if different than what is on RX):  Type 2 diabetes mellitus without complication, unspecified whether long term insulin use (H) (E11.9)  Previously Tried and Failed:    Rationale:    Insurance Name: [employer]   Insurance ID: [id number]      Pharmacy Information (if different than what is on RX)  Name: [facility] - [address]  Phone: [phone]

## 2021-08-13 NOTE — TELEPHONE ENCOUNTER
Central Prior Authorization Team   Phone: [phone]      PA Initiation    Medication: insulin detemir (LEVEMIR PEN) 100 UNIT/ML pen-PA initiated  Insurance Company: [employer] - Phone [phone] Fax [phone]  Pharmacy Filling the Rx: [facility] - [address]  Filling Pharmacy Phone: [phone]  Filling Pharmacy Fax:    Start Date: 8/13/2021

## 2021-08-13 NOTE — TELEPHONE ENCOUNTER
Message received from patient stating that she would prefer scopolamine patches. Earlier communication between providers that insurance is not covering scopolamine patches and the meclizine is covered alternative.    Message sent to patient informing her of this.

## 2021-08-17 NOTE — TELEPHONE ENCOUNTER
Prior Authorization Approval    Authorization Effective Date: 7/18/2021  Authorization Expiration Date: 8/17/2022  Medication: insulin detemir (LEVEMIR PEN) 100 UNIT/ML pen-PA approved  Approved Dose/Quantity:   Reference #: [id number]   Insurance Company: [employer] - Phone [phone] Fax [phone]  Expected CoPay:       CoPay Card Available:      Foundation Assistance Needed:    Which Pharmacy is filling the prescription (Not needed for infusion/clinic administered): [facility] - [address]  Pharmacy Notified: No  Patient Notified: Yes

## 2021-08-17 NOTE — TELEPHONE ENCOUNTER
I spoke to Ilsa at [employer]. This was submitted by the patient on 8/13. She did not provide correct address, phone or fax for prescriber, so additional question set was not received by us and case could not be completed. We completed additional question set via phone. This has been approved. She will fax approval. Will update chart when received. Patient was notified via voicemail.

## 2021-09-14 ASSESSMENT — ENCOUNTER SYMPTOMS
SKIN CHANGES: 0
POOR WOUND HEALING: 0
NAIL CHANGES: 0

## 2021-09-24 ENCOUNTER — OFFICE VISIT (OUTPATIENT)
Dept: INTERNAL MEDICINE | Facility: CLINIC | Age: 61
End: 2021-09-24
Attending: INTERNAL MEDICINE
Payer: COMMERCIAL

## 2021-09-24 VITALS
SYSTOLIC BLOOD PRESSURE: 104 MMHG | WEIGHT: 122.5 LBS | HEIGHT: 64 IN | HEART RATE: 80 BPM | DIASTOLIC BLOOD PRESSURE: 71 MMHG | BODY MASS INDEX: 20.92 KG/M2

## 2021-09-24 DIAGNOSIS — Z00.00 ROUTINE GENERAL MEDICAL EXAMINATION AT A HEALTH CARE FACILITY: Primary | ICD-10-CM

## 2021-09-24 DIAGNOSIS — E11.9 TYPE 2 DIABETES MELLITUS WITHOUT COMPLICATION, UNSPECIFIED WHETHER LONG TERM INSULIN USE (H): ICD-10-CM

## 2021-09-24 LAB
ANION GAP SERPL CALCULATED.3IONS-SCNC: 3 MMOL/L (ref 3–14)
BUN SERPL-MCNC: 11 MG/DL (ref 7–30)
CALCIUM SERPL-MCNC: 9.4 MG/DL (ref 8.5–10.1)
CHLORIDE BLD-SCNC: 105 MMOL/L (ref 94–109)
CHOLEST SERPL-MCNC: 122 MG/DL
CO2 SERPL-SCNC: 29 MMOL/L (ref 20–32)
CREAT SERPL-MCNC: 0.68 MG/DL (ref 0.52–1.04)
CREAT UR-MCNC: 150 MG/DL
FASTING STATUS PATIENT QL REPORTED: NO
GFR SERPL CREATININE-BSD FRML MDRD: >90 ML/MIN/1.73M2
GLUCOSE BLD-MCNC: 87 MG/DL (ref 70–99)
HBA1C MFR BLD: 6.3 % (ref 0–5.6)
HDLC SERPL-MCNC: 45 MG/DL
LDLC SERPL CALC-MCNC: 53 MG/DL
MICROALBUMIN UR-MCNC: 22 MG/L
MICROALBUMIN/CREAT UR: 14.67 MG/G CR (ref 0–25)
NONHDLC SERPL-MCNC: 77 MG/DL
POTASSIUM BLD-SCNC: 4.5 MMOL/L (ref 3.4–5.3)
SODIUM SERPL-SCNC: 137 MMOL/L (ref 133–144)
TRIGL SERPL-MCNC: 121 MG/DL

## 2021-09-24 PROCEDURE — 83036 HEMOGLOBIN GLYCOSYLATED A1C: CPT | Performed by: INTERNAL MEDICINE

## 2021-09-24 PROCEDURE — 90472 IMMUNIZATION ADMIN EACH ADD: CPT

## 2021-09-24 PROCEDURE — 82043 UR ALBUMIN QUANTITATIVE: CPT | Performed by: INTERNAL MEDICINE

## 2021-09-24 PROCEDURE — 90636 HEP A/HEP B VACC ADULT IM: CPT

## 2021-09-24 PROCEDURE — 80061 LIPID PANEL: CPT | Performed by: INTERNAL MEDICINE

## 2021-09-24 PROCEDURE — 99396 PREV VISIT EST AGE 40-64: CPT | Performed by: INTERNAL MEDICINE

## 2021-09-24 PROCEDURE — 90471 IMMUNIZATION ADMIN: CPT

## 2021-09-24 PROCEDURE — 36415 COLL VENOUS BLD VENIPUNCTURE: CPT | Performed by: INTERNAL MEDICINE

## 2021-09-24 PROCEDURE — 250N000011 HC RX IP 250 OP 636

## 2021-09-24 PROCEDURE — G0463 HOSPITAL OUTPT CLINIC VISIT: HCPCS

## 2021-09-24 PROCEDURE — 90686 IIV4 VACC NO PRSV 0.5 ML IM: CPT

## 2021-09-24 PROCEDURE — G0008 ADMIN INFLUENZA VIRUS VAC: HCPCS

## 2021-09-24 PROCEDURE — 80048 BASIC METABOLIC PNL TOTAL CA: CPT | Performed by: INTERNAL MEDICINE

## 2021-09-24 ASSESSMENT — ANXIETY QUESTIONNAIRES
2. NOT BEING ABLE TO STOP OR CONTROL WORRYING: SEVERAL DAYS
5. BEING SO RESTLESS THAT IT IS HARD TO SIT STILL: NOT AT ALL
1. FEELING NERVOUS, ANXIOUS, OR ON EDGE: NOT AT ALL
3. WORRYING TOO MUCH ABOUT DIFFERENT THINGS: NOT AT ALL
7. FEELING AFRAID AS IF SOMETHING AWFUL MIGHT HAPPEN: NOT AT ALL
6. BECOMING EASILY ANNOYED OR IRRITABLE: NOT AT ALL
GAD7 TOTAL SCORE: 1

## 2021-09-24 ASSESSMENT — PATIENT HEALTH QUESTIONNAIRE - PHQ9
5. POOR APPETITE OR OVEREATING: NOT AT ALL
SUM OF ALL RESPONSES TO PHQ QUESTIONS 1-9: 1

## 2021-09-24 ASSESSMENT — MIFFLIN-ST. JEOR: SCORE: 1115.66

## 2021-09-24 NOTE — PROGRESS NOTES
SUBJECTIVE:   CC: Krish Renee is an 60 year old woman who presents for preventive health visit.       Patient has been advised of split billing requirements and indicates understanding: Yes  HPI    Patient is here for follow-up on several issues.  She states that her diabetes under good control.  She would like a refill of her medications.  She is up-to-date on her vaccinations as well as screening.  She is due for Pap smear.    -------------------------------------    Today's PHQ-2 Score:   PHQ-2 ( 1999 Pfizer) 9/24/2021   Q1: Little interest or pleasure in doing things 0   Q2: Feeling down, depressed or hopeless 0   PHQ-2 Score 0   Q1: Little interest or pleasure in doing things -   Q2: Feeling down, depressed or hopeless -   PHQ-2 Score -       Abuse: Current or Past (Physical, Sexual or Emotional) - No  Do you feel safe in your environment? Yes    Have you ever done Advance Care Planning? (For example, a Health Directive, POLST, or a discussion with a medical provider or your loved ones about your wishes): No, advance care planning information given to patient to review.  Patient plans to discuss their wishes with loved ones or provider.      Social History     Tobacco Use     Smoking status: Never Smoker     Smokeless tobacco: Never Used   Substance Use Topics     Alcohol use: No     If you drink alcohol do you typically have >3 drinks per day or >7 drinks per week? No    Alcohol Use 2/16/2016   Prescreen: >3 drinks/day or >7 drinks/week? The patient does not drink >3 drinks per day nor >7 drinks per week.       Reviewed orders with patient.  Reviewed health maintenance and updated orders accordingly - Yes  Labs reviewed in EPIC    Breast Cancer Screening:  Any new diagnosis of family breast, ovarian, or bowel cancer? No    FHS-7: No flowsheet data found.    Mammogram Screening: Recommended mammography every 1-2 years with patient discussion and risk factor consideration  Pertinent mammograms are reviewed  under the imaging tab.    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     CONSTITUTIONAL: NEGATIVE for fever, chills, change in weight  INTEGUMENTARY/SKIN: NEGATIVE for worrisome rashes, moles or lesions  EYES: NEGATIVE for vision changes or irritation  ENT: NEGATIVE for ear, mouth and throat problems  RESP: NEGATIVE for significant cough or SOB  BREAST: NEGATIVE for masses, tenderness or discharge  CV: NEGATIVE for chest pain, palpitations or peripheral edema  GI: NEGATIVE for nausea, abdominal pain, heartburn, or change in bowel habits  : NEGATIVE for unusual urinary or vaginal symptoms. No vaginal bleeding.  MUSCULOSKELETAL: NEGATIVE for significant arthralgias or myalgia  NEURO: NEGATIVE for weakness, dizziness or paresthesias  PSYCHIATRIC: NEGATIVE for changes in mood or  "affect      OBJECTIVE:   /71   Pulse 80   Ht 1.626 m (5' 4\")   Wt 55.6 kg (122 lb 8 oz)   LMP 07/17/2013   BMI 21.03 kg/m    Physical Exam  GENERAL: healthy, alert and no distress  RESP: lungs clear to auscultation - no rales, rhonchi or wheezes  CV: regular rate and rhythm, normal S1 S2, no S3 or S4, no murmur, click or rub, no peripheral edema and peripheral pulses strong  ABDOMEN: soft, nontender and bowel sounds normal  MS: no gross musculoskeletal defects noted, no edema    Diagnostic Test Results:  Labs reviewed in Epic    ASSESSMENT/PLAN:   Krish was seen today for annual visit.    Diagnoses and all orders for this visit:    Routine general medical examination at a health care facility    Type 2 diabetes mellitus without complication, unspecified whether long term insulin use (H)  -     Basic Metabolic Panel; Future  -     Lipid Panel; Future  -     Hemoglobin A1c; Future  -     Albumin Random Urine Quantitative with Creat Ratio; Future  -     Cancel: INFLUENZA QUAD, RECOMBINANT, P-FREE (RIV4) (FLUBLOK)  -     HEPA/HEPB VACCINE ADULT IM  -     HEPA/HEPB VACCINE ADULT IM; Standing  -     Basic Metabolic Panel  -     Lipid Panel  -     Hemoglobin A1c  -     Albumin Random Urine Quantitative with Creat Ratio    Other orders  -     INFLUENZA VACCINE IM >6 MO VALENT IIV4 (AFLURIA/FLUZONE)         Long acting, 12 units  On atorvastatin 40mg, baby aspirin  Mammogram 12/2020 negative    Patient has been advised of split billing requirements and indicates understanding: Yes  COUNSELING:  Reviewed preventive health counseling, as reflected in patient instructions       Regular exercise       Healthy diet/nutrition       Vision screening    Estimated body mass index is 21.03 kg/m  as calculated from the following:    Height as of this encounter: 1.626 m (5' 4\").    Weight as of this encounter: 55.6 kg (122 lb 8 oz).        She reports that she has never smoked. She has never used smokeless " tobacco.      Counseling Resources:  ATP IV Guidelines  Pooled Cohorts Equation Calculator  Breast Cancer Risk Calculator  BRCA-Related Cancer Risk Assessment: FHS-7 Tool  FRAX Risk Assessment  ICSI Preventive Guidelines  Dietary Guidelines for Americans, 2010  USDA's MyPlate  ASA Prophylaxis  Lung CA Screening    Keke Lewis DO IM PGY-2  Seen and staffed with Dr. Varela    I was present during the visit and the patient was seen and examined by me in conjunction with the resident.  I discussed the pertinent history, exam, results and plan with the resident and agree with the documentation above with the following exceptions: none.      Ariane Varela MD  [facility]  Answers for HPI/ROS submitted by the patient on 9/14/2021  General Symptoms: No  Skin Symptoms: Yes  HENT Symptoms: No  EYE SYMPTOMS: No  HEART SYMPTOMS: No  LUNG SYMPTOMS: No  INTESTINAL SYMPTOMS: No  URINARY SYMPTOMS: No  GYNECOLOGIC SYMPTOMS: No  BREAST SYMPTOMS: No  SKELETAL SYMPTOMS: No  BLOOD SYMPTOMS: No  NERVOUS SYSTEM SYMPTOMS: No  MENTAL HEALTH SYMPTOMS: No  Changes in hair: No  Changes in moles/birth marks: No  Itching: Yes  Changes in nails: No  Acne: No  Hair in places you don't want it: No  Change in facial hair: No  Warts: No  Non-healing sores: No  Scarring: No  Flaking of skin: No  Color changes of hands/feet in cold : No  Sun sensitivity: No  Skin thickening: No

## 2021-09-24 NOTE — LETTER
9/24/2021       RE: Krish Renee  [address]  [facility] [address]     Dear Colleague,    Thank you for referring your patient, Krish Renee, to the [facility] [place] at [facility]. Please see a copy of my visit note below.       SUBJECTIVE:   CC: Krish Renee is an 60 year old woman who presents for preventive health visit.       Patient has been advised of split billing requirements and indicates understanding: Yes  HPI    Patient is here for follow-up on several issues.  She states that her diabetes under good control.  She would like a refill of her medications.  She is up-to-date on her vaccinations as well as screening.  She is due for Pap smear.    -------------------------------------    Today's PHQ-2 Score:   PHQ-2 ( 1999 Pfizer) 9/24/2021   Q1: Little interest or pleasure in doing things 0   Q2: Feeling down, depressed or hopeless 0   PHQ-2 Score 0   Q1: Little interest or pleasure in doing things -   Q2: Feeling down, depressed or hopeless -   PHQ-2 Score -       Abuse: Current or Past (Physical, Sexual or Emotional) - No  Do you feel safe in your environment? Yes    Have you ever done Advance Care Planning? (For example, a Health Directive, POLST, or a discussion with a medical provider or your loved ones about your wishes): No, advance care planning information given to patient to review.  Patient plans to discuss their wishes with loved ones or provider.      Social History     Tobacco Use     Smoking status: Never Smoker     Smokeless tobacco: Never Used   Substance Use Topics     Alcohol use: No     If you drink alcohol do you typically have >3 drinks per day or >7 drinks per week? No    Alcohol Use 2/16/2016   Prescreen: >3 drinks/day or >7 drinks/week? The patient does not drink >3 drinks per day nor >7 drinks per week.       Reviewed orders with patient.  Reviewed health maintenance and updated orders accordingly -  Yes  Labs reviewed in EPIC    Breast Cancer Screening:  Any new diagnosis of family breast, ovarian, or bowel cancer? No    FHS-7: No flowsheet data found.    Mammogram Screening: Recommended mammography every 1-2 years with patient discussion and risk factor consideration  Pertinent mammograms are reviewed under the imaging tab.    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     CONSTITUTIONAL: NEGATIVE for fever, chills, change in weight  INTEGUMENTARY/SKIN: NEGATIVE for worrisome rashes, moles or lesions  EYES: NEGATIVE for vision changes or irritation  ENT: NEGATIVE for ear, mouth and throat problems  RESP: NEGATIVE for significant cough or SOB  BREAST: NEGATIVE for masses, tenderness or discharge  CV: NEGATIVE for chest pain, palpitations or peripheral edema  GI: NEGATIVE for  "nausea, abdominal pain, heartburn, or change in bowel habits  : NEGATIVE for unusual urinary or vaginal symptoms. No vaginal bleeding.  MUSCULOSKELETAL: NEGATIVE for significant arthralgias or myalgia  NEURO: NEGATIVE for weakness, dizziness or paresthesias  PSYCHIATRIC: NEGATIVE for changes in mood or affect      OBJECTIVE:   /71   Pulse 80   Ht 1.626 m (5' 4\")   Wt 55.6 kg (122 lb 8 oz)   LMP 07/17/2013   BMI 21.03 kg/m    Physical Exam  GENERAL: healthy, alert and no distress  RESP: lungs clear to auscultation - no rales, rhonchi or wheezes  CV: regular rate and rhythm, normal S1 S2, no S3 or S4, no murmur, click or rub, no peripheral edema and peripheral pulses strong  ABDOMEN: soft, nontender and bowel sounds normal  MS: no gross musculoskeletal defects noted, no edema    Diagnostic Test Results:  Labs reviewed in Epic    ASSESSMENT/PLAN:   Krish was seen today for annual visit.    Diagnoses and all orders for this visit:    Routine general medical examination at a health care facility    Type 2 diabetes mellitus without complication, unspecified whether long term insulin use (H)  -     Basic Metabolic Panel; Future  -     Lipid Panel; Future  -     Hemoglobin A1c; Future  -     Albumin Random Urine Quantitative with Creat Ratio; Future  -     Cancel: INFLUENZA QUAD, RECOMBINANT, P-FREE (RIV4) (FLUBLOK)  -     HEPA/HEPB VACCINE ADULT IM  -     HEPA/HEPB VACCINE ADULT IM; Standing  -     Basic Metabolic Panel  -     Lipid Panel  -     Hemoglobin A1c  -     Albumin Random Urine Quantitative with Creat Ratio    Other orders  -     INFLUENZA VACCINE IM >6 MO VALENT IIV4 (AFLURIA/FLUZONE)         Long acting, 12 units  On atorvastatin 40mg, baby aspirin  Mammogram 12/2020 negative    Patient has been advised of split billing requirements and indicates understanding: Yes  COUNSELING:  Reviewed preventive health counseling, as reflected in patient instructions       Regular exercise       Healthy " "diet/nutrition       Vision screening    Estimated body mass index is 21.03 kg/m  as calculated from the following:    Height as of this encounter: 1.626 m (5' 4\").    Weight as of this encounter: 55.6 kg (122 lb 8 oz).        She reports that she has never smoked. She has never used smokeless tobacco.      Counseling Resources:  ATP IV Guidelines  Pooled Cohorts Equation Calculator  Breast Cancer Risk Calculator  BRCA-Related Cancer Risk Assessment: FHS-7 Tool  FRAX Risk Assessment  ICSI Preventive Guidelines  Dietary Guidelines for Americans, 2010  USDA's MyPlate  ASA Prophylaxis  Lung CA Screening    Keke Lewis DO  PGY-2  Seen and staffed with Dr. Varela    I was present during the visit and the patient was seen and examined by me in conjunction with the resident.  I discussed the pertinent history, exam, results and plan with the resident and agree with the documentation above with the following exceptions: none.      Ariane Varela MD  [facility]  Answers for HPI/ROS submitted by the patient on 9/14/2021  General Symptoms: No  Skin Symptoms: Yes  HENT Symptoms: No  EYE SYMPTOMS: No  HEART SYMPTOMS: No  LUNG SYMPTOMS: No  INTESTINAL SYMPTOMS: No  URINARY SYMPTOMS: No  GYNECOLOGIC SYMPTOMS: No  BREAST SYMPTOMS: No  SKELETAL SYMPTOMS: No  BLOOD SYMPTOMS: No  NERVOUS SYSTEM SYMPTOMS: No  MENTAL HEALTH SYMPTOMS: No  Changes in hair: No  Changes in moles/birth marks: No  Itching: Yes  Changes in nails: No  Acne: No  Hair in places you don't want it: No  Change in facial hair: No  Warts: No  Non-healing sores: No  Scarring: No  Flaking of skin: No  Color changes of hands/feet in cold : No  Sun sensitivity: No  Skin thickening: No      "

## 2021-09-25 ASSESSMENT — ANXIETY QUESTIONNAIRES: GAD7 TOTAL SCORE: 1

## 2021-10-20 ENCOUNTER — OFFICE VISIT (OUTPATIENT)
Dept: OPHTHALMOLOGY | Facility: CLINIC | Age: 61
End: 2021-10-20
Payer: COMMERCIAL

## 2021-10-20 DIAGNOSIS — H52.13 MYOPIA OF BOTH EYES: ICD-10-CM

## 2021-10-20 DIAGNOSIS — E11.9 TYPE 2 DIABETES MELLITUS WITHOUT RETINOPATHY (H): Primary | ICD-10-CM

## 2021-10-20 DIAGNOSIS — H25.13 SENILE NUCLEAR SCLEROSIS, BILATERAL: ICD-10-CM

## 2021-10-20 PROCEDURE — 92014 COMPRE OPH EXAM EST PT 1/>: CPT | Performed by: OPTOMETRIST

## 2021-10-20 ASSESSMENT — TONOMETRY
IOP_METHOD: TONOPEN
OD_IOP_MMHG: 15
OS_IOP_MMHG: 18

## 2021-10-20 ASSESSMENT — VISUAL ACUITY
OS_CC: 20/20
METHOD: SNELLEN - LINEAR
OD_CC: 20/20
CORRECTION_TYPE: GLASSES

## 2021-10-20 ASSESSMENT — REFRACTION_WEARINGRX
OD_CYLINDER: +0.50
OD_SPHERE: -2.50
OD_AXIS: 018
OS_AXIS: 146
OS_CYLINDER: +0.25
OD_ADD: +2.50
OS_ADD: +2.50
OS_SPHERE: -2.25
SPECS_TYPE: PAL

## 2021-10-20 ASSESSMENT — SLIT LAMP EXAM - LIDS
COMMENTS: NORMAL
COMMENTS: NORMAL

## 2021-10-20 ASSESSMENT — CONF VISUAL FIELD
OD_NORMAL: 1
OS_NORMAL: 1
METHOD: COUNTING FINGERS

## 2021-10-20 ASSESSMENT — EXTERNAL EXAM - RIGHT EYE: OD_EXAM: NORMAL

## 2021-10-20 ASSESSMENT — EXTERNAL EXAM - LEFT EYE: OS_EXAM: NORMAL

## 2021-10-20 ASSESSMENT — CUP TO DISC RATIO
OS_RATIO: 0.2
OD_RATIO: 0.2

## 2021-10-20 NOTE — NURSING NOTE
Chief Complaints and History of Present Illnesses   Patient presents with     Diabetic Eye Exam Follow Up     Chief Complaint(s) and History of Present Illness(es)     Diabetic Eye Exam Follow Up     Vision: is stable    Associated symptoms: Negative for double vision, floaters, flashes, redness and itching    Diabetes Type: Type 2    Treatments tried: no treatments    Pain scale: 0/10              Comments     Pt states no vision problems, got new glasses 2 months ago  LBS: 116  Last A1c 6.3  Lab Results       Component                Value               Date                       A1C                      6.3                 09/24/2021                 A1C                      6.7                 06/21/2021                 A1C                      6.3                 09/15/2020                 A1C                      6.3                 12/07/2018                 A1C                      6.6                 05/15/2018                 A1C                      6.1                 09/25/2017              Viktoria Christine COT 8:36 AM October 20, 2021

## 2021-10-20 NOTE — PROGRESS NOTES
Assessment/Plan  (E11.9) Type 2 diabetes mellitus without retinopathy (H)  (primary encounter diagnosis)  Plan: Discussed findings with patient. Encouraged continued blood glucose, pressure, and lipid control. Patient should continue following recommendations of primary care provider. Patient should plan on returning to clinic annually for a dilated eye exam but was encouraged to return to clinic sooner with new flashes/floaters or other vision changes.     (H25.13) Senile nuclear sclerosis, bilateral  Comment: Not visually significant  Plan: Monitor.     (H52.13) Myopia of both eyes  Comment: Patient got new glasses about 2 months ago  Plan: Monitor. No refraction today.       Complete documentation of historical and exam elements from today's encounter can  be found in the full encounter summary report (not reduplicated in this progress  note). I personally obtained the chief complaint(s) and history of present illness. I  confirmed and edited as necessary the review of systems, past medical/surgical  history, family history, social history, and examination findings as documented by  others; and I examined the patient myself. I personally reviewed the relevant tests,  images, and reports as documented above. I formulated and edited as necessary the  assessment and plan and discussed the findings and management plan with the  patient and family.    Elia Jaffe OD

## 2021-10-20 NOTE — LETTER
10/20/2021       RE: Krish Renee  [address]  [facility] [address]     Dear Colleague,    Thank you for referring your patient, Krish Renee, to the [facility] [place] at [facility]. Please see a copy of my visit note below.    Assessment/Plan  (E11.9) Type 2 diabetes mellitus without retinopathy (H)  (primary encounter diagnosis)  Plan: Discussed findings with patient. Encouraged continued blood glucose, pressure, and lipid control. Patient should continue following recommendations of primary care provider. Patient should plan on returning to clinic annually for a dilated eye exam but was encouraged to return to clinic sooner with new flashes/floaters or other vision changes.     (H25.13) Senile nuclear sclerosis, bilateral  Comment: Not visually significant  Plan: Monitor.     (H52.13) Myopia of both eyes  Comment: Patient got new glasses about 2 months ago  Plan: Monitor. No refraction today.       Complete documentation of historical and exam elements from today's encounter can  be found in the full encounter summary report (not reduplicated in this progress  note). I personally obtained the chief complaint(s) and history of present illness. I  confirmed and edited as necessary the review of systems, past medical/surgical  history, family history, social history, and examination findings as documented by  others; and I examined the patient myself. I personally reviewed the relevant tests,  images, and reports as documented above. I formulated and edited as necessary the  assessment and plan and discussed the findings and management plan with the  patient and family.    Elia Jaffe OD       Again, thank you for allowing me to participate in the care of your patient.      Sincerely,    Elia Jaffe OD

## 2021-11-08 ENCOUNTER — IMMUNIZATION (OUTPATIENT)
Dept: NURSING | Facility: CLINIC | Age: 61
End: 2021-11-08
Payer: COMMERCIAL

## 2021-11-08 PROCEDURE — 91300 PR COVID VAC PFIZER DIL RECON 30 MCG/0.3 ML IM: CPT

## 2021-11-08 PROCEDURE — 0004A PR COVID VAC PFIZER DIL RECON 30 MCG/0.3 ML IM: CPT

## 2021-12-03 ENCOUNTER — OFFICE VISIT (OUTPATIENT)
Dept: OBGYN | Facility: CLINIC | Age: 61
End: 2021-12-03
Payer: COMMERCIAL

## 2021-12-03 VITALS
BODY MASS INDEX: 21.34 KG/M2 | SYSTOLIC BLOOD PRESSURE: 113 MMHG | HEART RATE: 84 BPM | WEIGHT: 125 LBS | DIASTOLIC BLOOD PRESSURE: 68 MMHG | HEIGHT: 64 IN

## 2021-12-03 DIAGNOSIS — E78.00 HYPERCHOLESTEREMIA: ICD-10-CM

## 2021-12-03 DIAGNOSIS — Z01.419 ENCOUNTER FOR GYNECOLOGICAL EXAMINATION WITHOUT ABNORMAL FINDING: Primary | ICD-10-CM

## 2021-12-03 DIAGNOSIS — Z12.4 SCREENING FOR MALIGNANT NEOPLASM OF CERVIX: ICD-10-CM

## 2021-12-03 DIAGNOSIS — R68.82 LOW LIBIDO: ICD-10-CM

## 2021-12-03 PROCEDURE — 99386 PREV VISIT NEW AGE 40-64: CPT | Performed by: OBSTETRICS & GYNECOLOGY

## 2021-12-03 PROCEDURE — 87624 HPV HI-RISK TYP POOLED RSLT: CPT | Performed by: OBSTETRICS & GYNECOLOGY

## 2021-12-03 PROCEDURE — 99213 OFFICE O/P EST LOW 20 MIN: CPT | Mod: 25 | Performed by: OBSTETRICS & GYNECOLOGY

## 2021-12-03 PROCEDURE — G0145 SCR C/V CYTO,THINLAYER,RESCR: HCPCS | Performed by: OBSTETRICS & GYNECOLOGY

## 2021-12-03 ASSESSMENT — MIFFLIN-ST. JEOR: SCORE: 1122

## 2021-12-03 NOTE — PROGRESS NOTES
Krish is a 61 year old  who presents for gyn exam.   Postmenopausal since .  She is having no menopausal symptoms. No vaginal bleeding noted.     Besides routine health maintenance, she has no other health concerns today. Presents for gyn portion of exam only.  She would also like to talk about libido. Has been well controlled for mental health on prozac. Really was not doing well prior to that. However, prozac gives her anorgasmia and low sexual desire. Has not really had sexual desire since she started it. Doesn't mind that, but feels guilty for her spouse who would still like to be sexually active.  He does not pressure her and is understanding.   GYNECOLOGIC HISTORY:  Menarche: 13   Number of lifetime partners: less than 5  She is not sexually active with male partner(s) and she is currently in monogamous relationship with .    History sexually transmitted infections:No STD history  STI testing offered?  Declined  Estrogen replacement therapy: No  JOYCELYN exposure: No    History of abnormal Pap smear: NO - age 30-65 PAP every 5 years with negative HPV co-testing recommended  Family history of breast CA: No  Family history of uterine/ovarian CA: Yes (Please explain): both sides  Family history of colon CA: Yes (Please explain): mothers side    HEALTH MAINTENANCE:  Cholesterol: (No components found for: CHOL2 ) History of abnormal lipids: No  Mammo: 21 . History of abnormal Mammo: No  Regular Self Breast Exams: No  Colonoscopy: 18 History of abnormal Colonoscopy: No  Dexa: NA History of abnormal Dexa: na  Calcium/Vitamin D intake: source:  dairy Adequate? Yes  TSH: (No components found for: TSH1 )  Pap; (  Lab Results   Component Value Date    PAP NIL 10/03/2016    PAP NIL 2012    PAP NIL 2011    )    HISTORY:  OB History    Para Term  AB Living   3 2 2 0 1 2   SAB IAB Ectopic Multiple Live Births   1 0 0 0 2      # Outcome Date GA Lbr Jas/2nd Weight Sex Delivery  Anes PTL Lv   3 Term 96 40w0d       MIRANDA   2 Term 94 40w0d    CS   MIRANDA   1 SAB      SAB   DEC     Past Medical History:   Diagnosis Date     Benign neoplasm 2006    pancreas      depression      Leiomyoma of uterus, unspecified      NONSPECIFIC MEDICAL HISTORY Aug 2006    neurologic evaluation for CVA-like symptoms, no etiology determined     ELEONORA (obstructive sleep apnea) Mild 2018    HSAT 2018 AHI 6.5     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     Past Surgical History:   Procedure Laterality Date     PANCREATECTOMY PARTIAL       [facility] NONSPECIFIC PROCEDURE      C/S     [facility] NONSPECIFIC PROCEDURE  2006    splenectomy/distal pancreatectomy at [facility] for benign tumor     [facility] NONSPECIFIC PROCEDURE      wisdom teeth removed     Family History   Problem Relation Age of Onset     Depression Mother      Circulatory Mother         blood clots     Arthritis Mother      Crohn's Disease Mother         s/p ileostomy     Colon Cancer Mother         stage II     Diabetes Father      Hypertension Father      Cerebrovascular Disease Father      Depression Father      Alcohol/Drug Father      Lipids Father      Cancer - colorectal Maternal Grandmother      C.A.D. Maternal Grandfather      Diabetes Paternal Grandmother      Cerebrovascular Disease Paternal Grandmother      Depression Brother      Alcoholism Brother      Substance Abuse Brother      Allergies Sister      Depression Sister      Sleep Apnea Sister      Depression Son      Anxiety Disorder Son      Cancer Maternal Aunt         uterine     Cancer Brother         non hodgkins lymphoma, and hemolitic anemia     Anemia Brother         hemolytic anemia     No Known Problems Daughter      Melanoma No family hx of      Skin Cancer No family hx of      Glaucoma No family hx of      Macular Degeneration No family hx of      Social History     Socioeconomic History     Marital status:      Spouse  name: Eldon Renee     Number of children: 2     Years of education: 18     Highest education level: None   Occupational History     Occupation:      Employer: [employer]   Tobacco Use     Smoking status: Never Smoker     Smokeless tobacco: Never Used   Substance and Sexual Activity     Alcohol use: No     Drug use: No     Sexual activity: Yes     Partners: Male     Birth control/protection: Male Surgical     Comment: vastectomy   Other Topics Concern     Parent/sibling w/ CABG, MI or angioplasty before 65F 55M? Not Asked   Social History Narrative     None     Social Determinants of Health     Financial Resource Strain: Not on file   Food Insecurity: Not on file   Transportation Needs: Not on file   Physical Activity: Not on file   Stress: Not on file   Social Connections: Not on file   Intimate Partner Violence: Not At Risk     Fear of Current or Ex-Partner: No     Emotionally Abused: No     Physically Abused: No     Sexually Abused: No   Housing Stability: Not on file       Current Outpatient Medications:      aspirin 81 MG EC tablet, Take 1 tablet (81 mg) by mouth daily, Disp: 90 tablet, Rfl: 3     atorvastatin (LIPITOR) 40 MG tablet, Take 1 tablet (40 mg) by mouth daily, Disp: 90 tablet, Rfl: 3     B-D U/F insulin pen needle, USE ONCE DAILY OR AS DIRECTED, Disp: 90 each, Rfl: 3     blood glucose (FREESTYLE LITE) test strip, Use to test blood sugars 4 times daily or as directed., Disp: 4 Box, Rfl: 11     blood glucose (NO BRAND SPECIFIED) test strip, Use to test blood sugar 4 times daily or as directed. Please dispense any brand covered by insurance that meets Pt need., Disp: 400 strip, Rfl: 3     blood glucose monitoring (FREESTYLE) lancets, Use to test blood sugars 4 times daily or as directed., Disp: 100 each, Rfl: 11     blood glucose monitoring (NO BRAND SPECIFIED) meter device kit, Use to test blood sugar 4 times daily or as directed. Please dispense any brand covered by insurance that meets Pt  "need. Thank you., Disp: 1 kit, Rfl: 0     econazole nitrate 1 % external cream, Apply topically daily To feet and toenails., Disp: 510 g, Rfl: 1     FLUoxetine (PROZAC) 20 MG capsule, Take 3 capsules (60 mg) by mouth daily, Disp: 21 capsule, Rfl: 0     glucagon (GLUCAGON EMERGENCY) 1 MG kit, Inject 1 mg into the muscle once for 1 dose, Disp: 1 mg, Rfl: 0     insulin detemir (LEVEMIR PEN) 100 UNIT/ML pen, Inject 10 Units Subcutaneous At Bedtime, Disp: 15 mL, Rfl: 4     insulin glargine (LANTUS SOLOSTAR PEN) 100 UNIT/ML pen, Inject 10 Units Subcutaneous every morning, Disp: 15 mL, Rfl: 11     insulin pen needle (B-D U/F) 31G X 8 MM miscellaneous, Use 1 X daily or as directed., Disp: 100 each, Rfl: 3     metFORMIN (GLUCOPHAGE-XR) 500 MG 24 hr tablet, Take 4 tablets (2,000 mg) by mouth daily (with dinner) Call clinic to schedule follow up appointment., Disp: 360 tablet, Rfl: 1     Allergies   Allergen Reactions     No Known Drug Allergy        Past medical, surgical, social and family history were reviewed and updated in EPIC.    ROS:   C:       NEGATIVE for fever, chills, change in weight  I:         NEGATIVE for worrisome rashes, moles or lesions  E:       NEGATIVE for vision changes or irritation  E/M:   NEGATIVE for ear, mouth and throat problems  R:       NEGATIVE for significant cough or SOB  CV:     NEGATIVE for chest pain, palpitations or peripheral edema  GI:      NEGATIVE for nausea, abdominal pain, heartburn, or change in bowel habits  :    NEGATIVE for frequency, dysuria, hematuria, vaginal discharge, or bleeding  M:       NEGATIVE for significant arthralgias or myalgia  N:       NEGATIVE for weakness, dizziness or paresthesias  E:       NEGATIVE for temperature intolerance, skin/hair changes  P:       NEGATIVE for changes in mood or affect    EXAM:  /68 (BP Location: Right arm, Patient Position: Sitting, Cuff Size: Adult Small)   Pulse 84   Ht 1.626 m (5' 4\")   Wt 56.7 kg (125 lb)   LMP " 2013   BMI 21.46 kg/m     BMI: Body mass index is 21.46 kg/m .    :  Vulva:  No external lesions, normal female hair distribution, no inguinal adenopathy.    Urethra:  Midline, non-tender, well supported, no discharge  Vagina:  Atrophic, no abnormal discharge, no lesions  Uterus:  Normal size, anteverted , non-tender, freely mobile  Ovaries:  No masses appreciated, non-tender, mobile  Rectal Exam: deferred  Psychiatric: Affect appropriate, cooperative,mentation appears normal.     COUNSELING:   Reviewed preventive health counseling, as reflected in patient instructions       (Greta)menopause management   reports that she has never smoked. She has never used smokeless tobacco.    Body mass index is 21.46 kg/m .      FRAX Risk Assessment  ASSESSMENT:  61 year old  here for gyn exam  (Z01.419) Encounter for gynecological examination without abnormal finding  (primary encounter diagnosis)  Comment:   Plan: Pap today - repeat in 5 years if normal    (Z12.4) Screening for malignant neoplasm of cervix  Comment:   Plan: Pap screen with HPV - recommended age 30 - 65         years, HPV Hold (Lab Only)            (R68.82) Low libido  Comment:   Plan:   Discussed low sexual desire  Discussed partially from prozac, also partially from deconditioned sexual desire with increased time of inactivity.   Options for management  - Work with prescribing mental health care practitioner to consider options that may have decreased impact on sexual functioning. She is unsure about this as she has done so well otherwise on prozac and was not in a good place prior.   - Therapy to address low sexual desire. Discussed Atiya Adis in our clinic has clinical interest in this area.   - Strategies for improving communication and increasing sexual activity with partner. Discussed setting an agreed upon frequency at a very attainable goal, then alternating initiating sexual activity. Celebrate small victories/improvements.     25  minutes spent on day of service in discussion of low libido in addition to time spent on gyn exam.

## 2021-12-06 RX ORDER — ATORVASTATIN CALCIUM 40 MG/1
40 TABLET, FILM COATED ORAL DAILY
Qty: 90 TABLET | Refills: 2 | Status: SHIPPED | OUTPATIENT
Start: 2021-12-06 | End: 2022-08-31

## 2021-12-06 NOTE — TELEPHONE ENCOUNTER
Last Clinic Visit: 9/24/2021  [facility]    LDL Cholesterol Calculated   Date Value Ref Range Status   09/24/2021 53 <=100 mg/dL Final   09/15/2020 66 <100 mg/dL Final     Comment:     Desirable:       <100 mg/dl

## 2021-12-07 LAB
BKR LAB AP GYN ADEQUACY: NORMAL
BKR LAB AP GYN INTERPRETATION: NORMAL
BKR LAB AP HPV REFLEX: NORMAL
BKR LAB AP PREVIOUS ABNORMAL: NORMAL
PATH REPORT.COMMENTS IMP SPEC: NORMAL
PATH REPORT.COMMENTS IMP SPEC: NORMAL
PATH REPORT.RELEVANT HX SPEC: NORMAL

## 2021-12-09 LAB
HUMAN PAPILLOMA VIRUS 16 DNA: NEGATIVE
HUMAN PAPILLOMA VIRUS 18 DNA: NEGATIVE
HUMAN PAPILLOMA VIRUS FINAL DIAGNOSIS: NORMAL
HUMAN PAPILLOMA VIRUS OTHER HR: NEGATIVE

## 2021-12-14 DIAGNOSIS — E11.9 TYPE 2 DIABETES MELLITUS (H): ICD-10-CM

## 2021-12-16 RX ORDER — METFORMIN HCL 500 MG
TABLET, EXTENDED RELEASE 24 HR ORAL
Qty: 360 TABLET | Refills: 3 | Status: SHIPPED | OUTPATIENT
Start: 2021-12-16 | End: 2021-12-17

## 2021-12-16 NOTE — TELEPHONE ENCOUNTER
METFORMIN HCL ER TABS 500MG  360 Tabs, 3 Refill sent to pharm 12/16/2021  Last Visit: 9/24/2021    Bharti Stallings RN  Central Triage Red Flags/Med Refills

## 2021-12-17 ENCOUNTER — TELEPHONE (OUTPATIENT)
Dept: OBGYN | Facility: CLINIC | Age: 61
End: 2021-12-17
Payer: COMMERCIAL

## 2021-12-17 DIAGNOSIS — Z79.4 TYPE 2 DIABETES MELLITUS WITHOUT COMPLICATION, WITH LONG-TERM CURRENT USE OF INSULIN (H): ICD-10-CM

## 2021-12-17 DIAGNOSIS — E11.9 TYPE 2 DIABETES MELLITUS WITHOUT COMPLICATION, WITH LONG-TERM CURRENT USE OF INSULIN (H): ICD-10-CM

## 2021-12-17 RX ORDER — METFORMIN HCL 500 MG
1000 TABLET, EXTENDED RELEASE 24 HR ORAL 2 TIMES DAILY WITH MEALS
Qty: 360 TABLET | Refills: 4 | Status: SHIPPED | OUTPATIENT
Start: 2021-12-17 | End: 2023-01-09

## 2021-12-17 NOTE — TELEPHONE ENCOUNTER
----- Message from Amna Call RN sent at 12/17/2021 10:20 AM CST -----  Regarding: [facility] [phone]    ----- Message -----  From: Amna Call RN  Sent: 12/17/2021  10:19 AM CST  [facility]  Subject: [facility]                                  Prescription clarification.    Ref# [id number]    Metformin ordered 4 tabs twice daily with meals - over recommended amount.

## 2021-12-20 ENCOUNTER — ANCILLARY PROCEDURE (OUTPATIENT)
Dept: MAMMOGRAPHY | Facility: CLINIC | Age: 61
End: 2021-12-20
Attending: INTERNAL MEDICINE
Payer: COMMERCIAL

## 2021-12-20 DIAGNOSIS — Z12.31 VISIT FOR SCREENING MAMMOGRAM: ICD-10-CM

## 2021-12-20 PROCEDURE — 77067 SCR MAMMO BI INCL CAD: CPT | Mod: GC | Performed by: RADIOLOGY

## 2021-12-20 PROCEDURE — 77063 BREAST TOMOSYNTHESIS BI: CPT | Mod: GC | Performed by: RADIOLOGY

## 2022-02-13 ENCOUNTER — HEALTH MAINTENANCE LETTER (OUTPATIENT)
Age: 62
End: 2022-02-13

## 2022-04-05 NOTE — TELEPHONE ENCOUNTER
DIAGNOSIS: L knee injury,self,no imaging,no surgery   APPOINTMENT DATE: 4.11.22   NOTES STATUS DETAILS   MEDICATION LIST Internal

## 2022-04-11 ENCOUNTER — OFFICE VISIT (OUTPATIENT)
Dept: ORTHOPEDICS | Facility: CLINIC | Age: 62
End: 2022-04-11
Payer: COMMERCIAL

## 2022-04-11 ENCOUNTER — PRE VISIT (OUTPATIENT)
Dept: ORTHOPEDICS | Facility: CLINIC | Age: 62
End: 2022-04-11

## 2022-04-11 VITALS — BODY MASS INDEX: 21.34 KG/M2 | HEIGHT: 64 IN | WEIGHT: 125 LBS

## 2022-04-11 DIAGNOSIS — M70.52 PES ANSERINUS BURSITIS OF LEFT KNEE: Primary | ICD-10-CM

## 2022-04-11 DIAGNOSIS — M25.569 KNEE PAIN: Primary | ICD-10-CM

## 2022-04-11 PROCEDURE — 99202 OFFICE O/P NEW SF 15 MIN: CPT | Performed by: FAMILY MEDICINE

## 2022-04-11 NOTE — PROGRESS NOTES
S  Left knee discomfort    Today, the patient reports that about 3 weeks ago she was at the gym running and wasn't wearing the best shoes and afterwards has had left knee pain.  She was doing her workouts in a pair of fuzzy crocs.  Denies any acute trauma. The pain is located over the medial aspect of the knee.  She points to the pes anserine bursa is the area that is been uncomfortable.  Denies any swelling, ecchymosis or redness. Denies any paresthesias. Denies any instability, locking or catching. She reports weakness compared to the right knee. She is not currently taking any medication for the pain. She has never received any steroid injections or been to physical therapy for the left knee. 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     Hyperlipidemia LDL goal <160     Ectopic breast tissue     Abdominal pain, right lower quadrant     Periocular dermatitis     Diabetes mellitus, type 2 (H)     ELEONORA (obstructive sleep apnea) Mild       FH:  Family History   Problem Relation Age of Onset     Depression Mother      Circulatory Mother         blood clots     Arthritis Mother      Crohn's Disease Mother         s/p ileostomy     Colon Cancer Mother         stage II     Diabetes Father      Hypertension Father      Cerebrovascular Disease Father      Depression Father       Alcohol/Drug Father      Lipids Father      Cancer - colorectal Maternal Grandmother      C.A.D. Maternal Grandfather      Diabetes Paternal Grandmother      Cerebrovascular Disease Paternal Grandmother      Depression Brother      Alcoholism Brother      Substance Abuse Brother      Allergies Sister      Depression Sister      Sleep Apnea Sister      Depression Son      Anxiety Disorder Son      Cancer Maternal Aunt         uterine     Cancer Brother         non hodgkins lymphoma, and hemolitic anemia     Anemia Brother         hemolytic anemia     No Known Problems Daughter      Melanoma No family hx of      Skin Cancer No family hx of      Glaucoma No family hx of      Macular Degeneration No family hx of        SH:  Social History     Socioeconomic History     Marital status:      Spouse name: Eldon Renee     Number of children: 2     Years of education: 18     Highest education level: Not on file   Occupational History     Occupation:      Employer: [employer]   Tobacco Use     Smoking status: Never Smoker     Smokeless tobacco: Never Used   Substance and Sexual Activity     Alcohol use: No     Drug use: No     Sexual activity: Yes     Partners: Male     Birth control/protection: Male Surgical     Comment: vastectomy   Other Topics Concern     Parent/sibling w/ CABG, MI or angioplasty before 65F 55M? Not Asked   Social History Narrative     Not on file     Social Determinants of Health     Financial Resource Strain: Not on file   Food Insecurity: Not on file   Transportation Needs: Not on file   Physical Activity: Not on file   Stress: Not on file   Social Connections: Not on file   Intimate Partner Violence: Not At Risk     Fear of Current or Ex-Partner: No     Emotionally Abused: No     Physically Abused: No     Sexually Abused: No   Housing Stability: Not on file       MEDS:  See EMR, reviewed  ALL:  See EMR, reviewed    REVIEW OF SYSTEMS:  CONSTITUTIONAL:NEGATIVE for fever, chills, change in  weight  INTEGUMENTARY/SKIN: NEGATIVE for worrisome rashes, moles or lesions  EYES: NEGATIVE for vision changes or irritation  ENT/MOUTH: NEGATIVE for ear, mouth and throat problems  RESP:NEGATIVE for significant cough or SOB  BREAST: NEGATIVE for masses, tenderness or discharge  CV: NEGATIVE for chest pain, palpitations or peripheral edema  GI: NEGATIVE for nausea, abdominal pain, heartburn, or change in bowel habits  :NEGATIVE for frequency, dysuria, or hematuria  :NEGATIVE for frequency, dysuria, or hematuria  NEURO: NEGATIVE for weakness, dizziness or paresthesias  ENDOCRINE: NEGATIVE for temperature intolerance, skin/hair changes  HEME/ALLERGY/IMMUNE: NEGATIVE for bleeding problems  PSYCHIATRIC: NEGATIVE for changes in mood or affect    Objective: The left knee reveals no effusion.  There is no obvious swelling over the pes anserine bursa but she has mild tenderness and confirms that this is her area that has been uncomfortable.  Can flex and extend the knee fully.  She is nontender over the medial or lateral joint line.  Anterior posterior drawer is negative.  MCL and LCL stresses are negative.  Normal range of motion at the left hip.  Overlying skin is normal.  Nontender over the patellar tendon.  She has a neutral heel with a neutral forefoot.  Appropriate conversation affect.    We went over x-rays that show no significant degenerative change.  I do not see an obvious area of osteonecrosis or osteochondral change.  No acute bony abnormality.    Assessment left sided knee pes anserine bursa-itis    Plan: Localized massage.  Relative rest.  Alteration of her athletic activities.  Proper shoewear, progression of return to activity explained.  She declines the need for injection.  Follow-up if not improved.

## 2022-04-11 NOTE — LETTER
4/11/2022       RE: Krish Renee  [address]  [facility] [address]       S  Left knee discomfort    Today, the patient reports that about 3 weeks ago she was at the gym running and wasn't wearing the best shoes and afterwards has had left knee pain.  She was doing her workouts in a pair of fuzzy crocs.  Denies any acute trauma. The pain is located over the medial aspect of the knee.  She points to the pes anserine bursa is the area that is been uncomfortable.  Denies any swelling, ecchymosis or redness. Denies any paresthesias. Denies any instability, locking or catching. She reports weakness compared to the right knee. She is not currently taking any medication for the pain. She has never received any steroid injections or been to physical therapy for the left knee. 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     Hyperlipidemia LDL goal <160     Ectopic breast tissue     Abdominal pain, right lower quadrant     Periocular dermatitis     Diabetes mellitus, type 2 (H)     ELEONORA (obstructive sleep apnea) Mild       FH:  Family History   Problem Relation Age of Onset     Depression Mother      Circulatory Mother         blood clots     Arthritis Mother      Crohn's Disease Mother         s/p ileostomy     Colon Cancer Mother         stage II     Diabetes Father       Hypertension Father      Cerebrovascular Disease Father      Depression Father      Alcohol/Drug Father      Lipids Father      Cancer - colorectal Maternal Grandmother      C.A.D. Maternal Grandfather      Diabetes Paternal Grandmother      Cerebrovascular Disease Paternal Grandmother      Depression Brother      Alcoholism Brother      Substance Abuse Brother      Allergies Sister      Depression Sister      Sleep Apnea Sister      Depression Son      Anxiety Disorder Son      Cancer Maternal Aunt         uterine     Cancer Brother         non hodgkins lymphoma, and hemolitic anemia     Anemia Brother         hemolytic anemia     No Known Problems Daughter      Melanoma No family hx of      Skin Cancer No family hx of      Glaucoma No family hx of      Macular Degeneration No family hx of        SH:  Social History     Socioeconomic History     Marital status:      Spouse name: Eldon Renee     Number of children: 2     Years of education: 18     Highest education level: Not on file   Occupational History     Occupation:      Employer: [employer]   Tobacco Use     Smoking status: Never Smoker     Smokeless tobacco: Never Used   Substance and Sexual Activity     Alcohol use: No     Drug use: No     Sexual activity: Yes     Partners: Male     Birth control/protection: Male Surgical     Comment: vastectomy   Other Topics Concern     Parent/sibling w/ CABG, MI or angioplasty before 65F 55M? Not Asked   Social History Narrative     Not on file     Social Determinants of Health     Financial Resource Strain: Not on file   Food Insecurity: Not on file   Transportation Needs: Not on file   Physical Activity: Not on file   Stress: Not on file   Social Connections: Not on file   Intimate Partner Violence: Not At Risk     Fear of Current or Ex-Partner: No     Emotionally Abused: No     Physically Abused: No     Sexually Abused: No   Housing Stability: Not on file       MEDS:  See EMR, reviewed  ALL:  See EMR,  reviewed    REVIEW OF SYSTEMS:  CONSTITUTIONAL:NEGATIVE for fever, chills, change in weight  INTEGUMENTARY/SKIN: NEGATIVE for worrisome rashes, moles or lesions  EYES: NEGATIVE for vision changes or irritation  ENT/MOUTH: NEGATIVE for ear, mouth and throat problems  RESP:NEGATIVE for significant cough or SOB  BREAST: NEGATIVE for masses, tenderness or discharge  CV: NEGATIVE for chest pain, palpitations or peripheral edema  GI: NEGATIVE for nausea, abdominal pain, heartburn, or change in bowel habits  :NEGATIVE for frequency, dysuria, or hematuria  :NEGATIVE for frequency, dysuria, or hematuria  NEURO: NEGATIVE for weakness, dizziness or paresthesias  ENDOCRINE: NEGATIVE for temperature intolerance, skin/hair changes  HEME/ALLERGY/IMMUNE: NEGATIVE for bleeding problems  PSYCHIATRIC: NEGATIVE for changes in mood or affect    Objective: The left knee reveals no effusion.  There is no obvious swelling over the pes anserine bursa but she has mild tenderness and confirms that this is her area that has been uncomfortable.  Can flex and extend the knee fully.  She is nontender over the medial or lateral joint line.  Anterior posterior drawer is negative.  MCL and LCL stresses are negative.  Normal range of motion at the left hip.  Overlying skin is normal.  Nontender over the patellar tendon.  She has a neutral heel with a neutral forefoot.  Appropriate conversation affect.    We went over x-rays that show no significant degenerative change.  I do not see an obvious area of osteonecrosis or osteochondral change.  No acute bony abnormality.    Assessment left sided knee pes anserine bursa-itis    Plan: Localized massage.  Relative rest.  Alteration of her athletic activities.  Proper shoewear, progression of return to activity explained.  She declines the need for injection.  Follow-up if not improved.            Gerard Hebert MD

## 2022-04-28 ENCOUNTER — IMMUNIZATION (OUTPATIENT)
Dept: NURSING | Facility: CLINIC | Age: 62
End: 2022-04-28
Payer: COMMERCIAL

## 2022-04-28 PROCEDURE — 0054A COVID-19,PF,PFIZER (12+ YRS): CPT

## 2022-04-28 PROCEDURE — 91305 COVID-19,PF,PFIZER (12+ YRS): CPT

## 2022-06-05 ENCOUNTER — HEALTH MAINTENANCE LETTER (OUTPATIENT)
Age: 62
End: 2022-06-05

## 2022-06-24 NOTE — PROGRESS NOTES
S:   - Sleep physician LEONARDO Campos   - pt in no acute distress  - pt has mild sleep apnea  - some tiredness, fatigue, snoring  - no jaw pain, jaw discomfort, or jaw related headaches currently  - no functional problems (eating, chewing etc.)  - no bite problems  - no ear problems  - stress OK, pt works for [employer]   - pt has dentist, last appointment wo problem, no acute dental issues  - No family of arthritis or CA relevant for ELEONORA    O:  - Opening: not limited, no pain, passive stretch ok  - Protrusion: not limited, no pain or discomfort, pt. can stay in max protrusion without discomfort  - Teeth ok  - No M. Mass. + Temp palpation pain or discomfort  - No TMJ palpation pain  - No neck palpation pain  - No joint clicking  - V and VII are grossly intact  - lips ok, salivary glands ok, oral mucosa ok    A:  - Obstructive sleep apnea    P:  - Explained appliance therapy with models  - Side effect explained: TMD problems and bite changes and what can be done to prevent problems  - Pt understood risk and was comfortable with the risks  - Explained procedure (impressions, bite, splint delivery, and titration)  - Bite registration and impressions, insert in 4 weeks    24-Jun-2022 20:19

## 2022-07-18 DIAGNOSIS — T75.3XXS SEA SICKNESS, SEQUELA: ICD-10-CM

## 2022-07-19 RX ORDER — MECLIZINE HCL 12.5 MG 12.5 MG/1
12.5 TABLET ORAL 3 TIMES DAILY PRN
Qty: 10 TABLET | Refills: 0 | Status: SHIPPED | OUTPATIENT
Start: 2022-07-19

## 2022-08-03 ENCOUNTER — E-VISIT (OUTPATIENT)
Dept: OBGYN | Facility: CLINIC | Age: 62
End: 2022-08-03
Payer: COMMERCIAL

## 2022-08-03 DIAGNOSIS — F33.1 MAJOR DEPRESSIVE DISORDER, RECURRENT EPISODE, MODERATE (H): Primary | ICD-10-CM

## 2022-08-03 PROCEDURE — 99421 OL DIG E/M SVC 5-10 MIN: CPT | Performed by: OBSTETRICS & GYNECOLOGY

## 2022-08-03 RX ORDER — ASPIRIN 81 MG/1
81 TABLET, CHEWABLE ORAL
COMMUNITY
Start: 2021-06-23 | End: 2022-10-14

## 2022-08-03 ASSESSMENT — ANXIETY QUESTIONNAIRES
8. IF YOU CHECKED OFF ANY PROBLEMS, HOW DIFFICULT HAVE THESE MADE IT FOR YOU TO DO YOUR WORK, TAKE CARE OF THINGS AT HOME, OR GET ALONG WITH OTHER PEOPLE?: SOMEWHAT DIFFICULT
GAD7 TOTAL SCORE: 13
7. FEELING AFRAID AS IF SOMETHING AWFUL MIGHT HAPPEN: NEARLY EVERY DAY
GAD7 TOTAL SCORE: 13
2. NOT BEING ABLE TO STOP OR CONTROL WORRYING: NEARLY EVERY DAY
4. TROUBLE RELAXING: SEVERAL DAYS
3. WORRYING TOO MUCH ABOUT DIFFERENT THINGS: NEARLY EVERY DAY
5. BEING SO RESTLESS THAT IT IS HARD TO SIT STILL: SEVERAL DAYS
1. FEELING NERVOUS, ANXIOUS, OR ON EDGE: SEVERAL DAYS
7. FEELING AFRAID AS IF SOMETHING AWFUL MIGHT HAPPEN: NEARLY EVERY DAY
6. BECOMING EASILY ANNOYED OR IRRITABLE: SEVERAL DAYS
GAD7 TOTAL SCORE: 13

## 2022-08-03 ASSESSMENT — PATIENT HEALTH QUESTIONNAIRE - PHQ9
10. IF YOU CHECKED OFF ANY PROBLEMS, HOW DIFFICULT HAVE THESE PROBLEMS MADE IT FOR YOU TO DO YOUR WORK, TAKE CARE OF THINGS AT HOME, OR GET ALONG WITH OTHER PEOPLE: SOMEWHAT DIFFICULT
SUM OF ALL RESPONSES TO PHQ QUESTIONS 1-9: 6
SUM OF ALL RESPONSES TO PHQ QUESTIONS 1-9: 6

## 2022-08-04 ASSESSMENT — ANXIETY QUESTIONNAIRES: GAD7 TOTAL SCORE: 13

## 2022-08-04 ASSESSMENT — PATIENT HEALTH QUESTIONNAIRE - PHQ9: SUM OF ALL RESPONSES TO PHQ QUESTIONS 1-9: 6

## 2022-08-29 DIAGNOSIS — E78.00 HYPERCHOLESTEREMIA: ICD-10-CM

## 2022-08-31 RX ORDER — ATORVASTATIN CALCIUM 40 MG/1
TABLET, FILM COATED ORAL
Qty: 90 TABLET | Refills: 0 | Status: SHIPPED | OUTPATIENT
Start: 2022-08-31 | End: 2022-12-09

## 2022-10-14 ENCOUNTER — LAB (OUTPATIENT)
Dept: LAB | Facility: CLINIC | Age: 62
End: 2022-10-14
Attending: INTERNAL MEDICINE
Payer: COMMERCIAL

## 2022-10-14 ENCOUNTER — OFFICE VISIT (OUTPATIENT)
Dept: INTERNAL MEDICINE | Facility: CLINIC | Age: 62
End: 2022-10-14
Attending: INTERNAL MEDICINE
Payer: COMMERCIAL

## 2022-10-14 VITALS
BODY MASS INDEX: 21.46 KG/M2 | DIASTOLIC BLOOD PRESSURE: 83 MMHG | HEART RATE: 80 BPM | SYSTOLIC BLOOD PRESSURE: 121 MMHG | WEIGHT: 125 LBS

## 2022-10-14 DIAGNOSIS — E78.00 HYPERCHOLESTEREMIA: ICD-10-CM

## 2022-10-14 DIAGNOSIS — Z00.00 ROUTINE GENERAL MEDICAL EXAMINATION AT A HEALTH CARE FACILITY: ICD-10-CM

## 2022-10-14 DIAGNOSIS — E11.9 TYPE 2 DIABETES MELLITUS WITHOUT COMPLICATION, UNSPECIFIED WHETHER LONG TERM INSULIN USE (H): Primary | ICD-10-CM

## 2022-10-14 DIAGNOSIS — E11.9 TYPE 2 DIABETES MELLITUS WITHOUT COMPLICATION, UNSPECIFIED WHETHER LONG TERM INSULIN USE (H): ICD-10-CM

## 2022-10-14 LAB
ALBUMIN SERPL-MCNC: 3.9 G/DL (ref 3.4–5)
ALP SERPL-CCNC: 84 U/L (ref 40–150)
ALT SERPL W P-5'-P-CCNC: 29 U/L (ref 0–50)
ANION GAP SERPL CALCULATED.3IONS-SCNC: 7 MMOL/L (ref 3–14)
AST SERPL W P-5'-P-CCNC: 21 U/L (ref 0–45)
BILIRUB SERPL-MCNC: 0.4 MG/DL (ref 0.2–1.3)
BUN SERPL-MCNC: 12 MG/DL (ref 7–30)
CALCIUM SERPL-MCNC: 9.4 MG/DL (ref 8.5–10.1)
CHLORIDE BLD-SCNC: 105 MMOL/L (ref 94–109)
CHOLEST SERPL-MCNC: 143 MG/DL
CO2 SERPL-SCNC: 28 MMOL/L (ref 20–32)
CREAT SERPL-MCNC: 0.54 MG/DL (ref 0.52–1.04)
CREAT UR-MCNC: 66 MG/DL
ERYTHROCYTE [DISTWIDTH] IN BLOOD BY AUTOMATED COUNT: 13.5 % (ref 10–15)
FASTING STATUS PATIENT QL REPORTED: NO
GFR SERPL CREATININE-BSD FRML MDRD: >90 ML/MIN/1.73M2
GLUCOSE BLD-MCNC: 86 MG/DL (ref 70–99)
HBA1C MFR BLD: 6.8 % (ref 0–5.6)
HCT VFR BLD AUTO: 40.4 % (ref 35–47)
HDLC SERPL-MCNC: 44 MG/DL
HGB BLD-MCNC: 13.7 G/DL (ref 11.7–15.7)
LDLC SERPL CALC-MCNC: 73 MG/DL
MCH RBC QN AUTO: 32 PG (ref 26.5–33)
MCHC RBC AUTO-ENTMCNC: 33.9 G/DL (ref 31.5–36.5)
MCV RBC AUTO: 94 FL (ref 78–100)
MICROALBUMIN UR-MCNC: 9 MG/L
MICROALBUMIN/CREAT UR: 13.64 MG/G CR (ref 0–25)
NONHDLC SERPL-MCNC: 99 MG/DL
PLATELET # BLD AUTO: 462 10E3/UL (ref 150–450)
POTASSIUM BLD-SCNC: 4.2 MMOL/L (ref 3.4–5.3)
PROT SERPL-MCNC: 7.7 G/DL (ref 6.8–8.8)
RBC # BLD AUTO: 4.28 10E6/UL (ref 3.8–5.2)
SODIUM SERPL-SCNC: 140 MMOL/L (ref 133–144)
TRIGL SERPL-MCNC: 130 MG/DL
WBC # BLD AUTO: 8.8 10E3/UL (ref 4–11)

## 2022-10-14 PROCEDURE — 90686 IIV4 VACC NO PRSV 0.5 ML IM: CPT

## 2022-10-14 PROCEDURE — 250N000011 HC RX IP 250 OP 636

## 2022-10-14 PROCEDURE — 80061 LIPID PANEL: CPT

## 2022-10-14 PROCEDURE — 85027 COMPLETE CBC AUTOMATED: CPT

## 2022-10-14 PROCEDURE — 36415 COLL VENOUS BLD VENIPUNCTURE: CPT

## 2022-10-14 PROCEDURE — 82040 ASSAY OF SERUM ALBUMIN: CPT

## 2022-10-14 PROCEDURE — 99396 PREV VISIT EST AGE 40-64: CPT | Performed by: INTERNAL MEDICINE

## 2022-10-14 PROCEDURE — 83036 HEMOGLOBIN GLYCOSYLATED A1C: CPT

## 2022-10-14 PROCEDURE — G0008 ADMIN INFLUENZA VIRUS VAC: HCPCS

## 2022-10-14 PROCEDURE — 80053 COMPREHEN METABOLIC PANEL: CPT

## 2022-10-14 PROCEDURE — 82043 UR ALBUMIN QUANTITATIVE: CPT

## 2022-10-14 PROCEDURE — G0463 HOSPITAL OUTPT CLINIC VISIT: HCPCS

## 2022-10-14 ASSESSMENT — PATIENT HEALTH QUESTIONNAIRE - PHQ9
5. POOR APPETITE OR OVEREATING: NOT AT ALL
SUM OF ALL RESPONSES TO PHQ QUESTIONS 1-9: 3

## 2022-10-14 ASSESSMENT — ANXIETY QUESTIONNAIRES
GAD7 TOTAL SCORE: 2
GAD7 TOTAL SCORE: 2
7. FEELING AFRAID AS IF SOMETHING AWFUL MIGHT HAPPEN: NOT AT ALL
3. WORRYING TOO MUCH ABOUT DIFFERENT THINGS: SEVERAL DAYS
2. NOT BEING ABLE TO STOP OR CONTROL WORRYING: SEVERAL DAYS
1. FEELING NERVOUS, ANXIOUS, OR ON EDGE: NOT AT ALL
5. BEING SO RESTLESS THAT IT IS HARD TO SIT STILL: NOT AT ALL
6. BECOMING EASILY ANNOYED OR IRRITABLE: NOT AT ALL

## 2022-10-14 ASSESSMENT — PAIN SCALES - GENERAL: PAINLEVEL: NO PAIN (0)

## 2022-10-14 NOTE — LETTER
10/14/2022       RE: Krish Renee  [address]  [facility] [address]     Dear Colleague,    Thank you for referring your patient, Krish Renee, to the [facility] [place] at [facility]. Please see a copy of my visit note below.    Chief Complaint   Patient presents with     RECHECK     Diabetic check   Susan Ferro LPN     SUBJECTIVE:   CC: Krish Renee is an 61 year old woman who presents for preventive health visit.       Patient has been advised of split billing requirements and indicates understanding: Yes  Healthy Habits:    Do you get at least three servings of calcium containing foods daily (dairy, green leafy vegetables, etc.)? yes    Amount of exercise or daily activities, outside of work: hiking, walking    Problems taking medications regularly No    Medication side effects: No    Have you had an eye exam in the past two years? yes    Do you see a dentist twice per year? yes    Do you have sleep apnea, excessive snoring or daytime drowsiness?yes      -------------------------------------    Today's PHQ-2 Score:   PHQ-2 ( 1999 [employer]) 4/11/2022 9/24/2021   Q1: Little interest or pleasure in doing things 0 0   Q2: Feeling down, depressed or hopeless 0 0   PHQ-2 Score 0 0   PHQ-2 Total Score (12-17 Years)- Positive if 3 or more points; Administer PHQ-A if positive - 0   Q1: Little interest or pleasure in doing things - -   Q2: Feeling down, depressed or hopeless - -   PHQ-2 Score - -       Abuse: Current or Past(Physical, Sexual or Emotional)- No  Do you feel safe in your environment? Yes        Social History     Tobacco Use     Smoking status: Never     Smokeless tobacco: Never   Substance Use Topics     Alcohol use: No     If you drink alcohol do you typically have >3 drinks per day or >7 drinks per week? No                     Reviewed orders with patient.  Reviewed health maintenance and updated orders accordingly -  Yes  [facility]   Breast CA Risk Assessment (FHS-7) 12/20/2021   Did any of your first-degree relatives have breast or ovarian cancer? No   Did any of your relatives have bilateral breast cancer? No   Did any man in your family have breast cancer? No   Did any woman in your family have breast and ovarian cancer? No   Did any woman in your family have breast cancer before age 50 y? No   Do you have 2 or more relatives with breast and/or ovarian cancer? No   Do you have 2 or more relatives with breast and/or bowel cancer? Yes       Mammogram Screening: Recommended mammography every 1-2 years with patient discussion and risk factor consideration  Pertinent mammograms are reviewed under the imaging tab.    Pertinent mammograms are reviewed under the imaging tab.  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 for benign tumor     [facility] NONSPECIFIC PROCEDURE  1982    wisdom teeth removed       ROS:  CONSTITUTIONAL: NEGATIVE for fever, chills, change in weight  INTEGUMENTARY/SKIN: NEGATIVE for worrisome rashes, moles or lesions  EYES: NEGATIVE for vision changes or irritation  ENT: NEGATIVE for ear, mouth and throat problems  RESP: NEGATIVE for significant cough or SOB  BREAST: NEGATIVE for masses, tenderness or discharge  CV: NEGATIVE for chest pain, palpitations or peripheral edema  GI: NEGATIVE for nausea, abdominal pain, heartburn, or change in bowel habits  : NEGATIVE for unusual urinary or vaginal symptoms. No vaginal bleeding.  MUSCULOSKELETAL: NEGATIVE for significant arthralgias or myalgia  NEURO: NEGATIVE for weakness, dizziness or paresthesias  PSYCHIATRIC: NEGATIVE for changes in mood or affect     OBJECTIVE:   /83   Pulse 80   Wt 56.7 kg (125 lb)   LMP 07/17/2013   BMI 21.46 kg/m    EXAM:  GENERAL APPEARANCE: healthy, alert and no distress  RESP: lungs clear to auscultation - no rales, rhonchi or wheezes  CV: regular rate and rhythm, normal S1 S2, no S3 or S4, no murmur, click or rub, no peripheral edema and peripheral pulses strong  ABDOMEN: soft, nontender, no hepatosplenomegaly, no masses and bowel sounds normal  MS: no musculoskeletal defects are noted and gait is age appropriate without ataxia  SKIN: no suspicious lesions or rashes  NEURO: Normal strength and tone, sensory exam grossly normal, mentation intact and speech normal  DIABETIC FOOT EXAM: normal DP and PT pulses, no trophic changes or ulcerative lesions and normal sensory exam  PSYCH: mentation appears normal and affect normal/bright    Diagnostic Test Results:  Labs reviewed in Epic    ASSESSMENT/PLAN:       ICD-10-CM    1. Type 2 diabetes mellitus without complication, unspecified whether long term insulin use (H)  E11.9 INFLUENZA VACCINE IM >6 MO VALENT IIV4 (AFLURIA/FLUZONE)     Comprehensive metabolic panel  "    Hemoglobin A1c     CBC with platelets     Albumin Random Urine Quantitative with Creat Ratio     Adult Eye  Referral      2. Hypercholesteremia  E78.00 Lipid Profile      3. Routine general medical examination at a health care facility  Z00.00 Colonoscopy Screening  Referral          Patient has been advised of split billing requirements and indicates understanding: Yes  COUNSELING:   Reviewed preventive health counseling, as reflected in patient instructions       Regular exercise       Healthy diet/nutrition    Estimated body mass index is 21.46 kg/m  as calculated from the following:    Height as of 4/11/22: 1.626 m (5' 4\").    Weight as of this encounter: 56.7 kg (125 lb).        She reports that she has never smoked. She has never used smokeless tobacco.      Counseling Resources:  ATP IV Guidelines  Pooled Cohorts Equation Calculator  Breast Cancer Risk Calculator  BRCA-Related Cancer Risk Assessment: FHS-7 Tool  FRAX Risk Assessment  ICSI Preventive Guidelines  Dietary Guidelines for Americans, 2010  BringIt's MyPlate  ASA Prophylaxis  Lung CA Screening    Ariane Varela MD  [facility] [place]        Again, thank you for allowing me to participate in the care of your patient.      Sincerely,    Ariane Varela MD      "

## 2022-10-14 NOTE — LETTER
Date:October 14, 2022      Provider requested that no letter be sent. Do not send.       [facility]

## 2022-10-14 NOTE — PROGRESS NOTES
SUBJECTIVE:   CC: Krish Renee is an 61 year old woman who presents for preventive health visit.       Patient has been advised of split billing requirements and indicates understanding: Yes  Healthy Habits:    Do you get at least three servings of calcium containing foods daily (dairy, green leafy vegetables, etc.)? yes    Amount of exercise or daily activities, outside of work: hiking, walking    Problems taking medications regularly No    Medication side effects: No    Have you had an eye exam in the past two years? yes    Do you see a dentist twice per year? yes    Do you have sleep apnea, excessive snoring or daytime drowsiness?yes      -------------------------------------    Today's PHQ-2 Score:   PHQ-2 ( 1999 Pfizer) 4/11/2022 9/24/2021   Q1: Little interest or pleasure in doing things 0 0   Q2: Feeling down, depressed or hopeless 0 0   PHQ-2 Score 0 0   PHQ-2 Total Score (12-17 Years)- Positive if 3 or more points; Administer PHQ-A if positive - 0   Q1: Little interest or pleasure in doing things - -   Q2: Feeling down, depressed or hopeless - -   PHQ-2 Score - -       Abuse: Current or Past(Physical, Sexual or Emotional)- No  Do you feel safe in your environment? Yes        Social History     Tobacco Use     Smoking status: Never     Smokeless tobacco: Never   Substance Use Topics     Alcohol use: No     If you drink alcohol do you typically have >3 drinks per day or >7 drinks per week? No                     Reviewed orders with patient.  Reviewed health maintenance and updated orders accordingly - Yes  [facility]   Breast CA Risk Assessment (FHS-7) 12/20/2021   Did any of your first-degree relatives have breast or ovarian cancer? No   Did any of your relatives have bilateral breast cancer? No   Did any man in your family have breast cancer? No   Did any woman in your family have breast and ovarian cancer? No   Did any woman in your family have breast cancer before age 50 y? No   Do  you have 2 or more relatives with breast and/or ovarian cancer? No   Do you have 2 or more relatives with breast and/or bowel cancer? Yes       Mammogram Screening: Recommended mammography every 1-2 years with patient discussion and risk factor consideration  Pertinent mammograms are reviewed under the imaging tab.    Pertinent mammograms are reviewed under the imaging tab.  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      Past Surgical History:   Procedure Laterality Date     PANCREATECTOMY PARTIAL  2006     [facility] NONSPECIFIC PROCEDURE  1994    C/S     [facility] NONSPECIFIC PROCEDURE  Nov 2006    splenectomy/distal pancreatectomy at [facility] for benign tumor     [facility] NONSPECIFIC PROCEDURE  1982    wisdom teeth removed       ROS:  CONSTITUTIONAL: NEGATIVE for fever, chills, change in weight  INTEGUMENTARY/SKIN: NEGATIVE for worrisome rashes, moles or lesions  EYES: NEGATIVE for vision changes or irritation  ENT: NEGATIVE for ear, mouth and throat problems  RESP: NEGATIVE for significant cough or SOB  BREAST: NEGATIVE for masses,  tenderness or discharge  CV: NEGATIVE for chest pain, palpitations or peripheral edema  GI: NEGATIVE for nausea, abdominal pain, heartburn, or change in bowel habits  : NEGATIVE for unusual urinary or vaginal symptoms. No vaginal bleeding.  MUSCULOSKELETAL: NEGATIVE for significant arthralgias or myalgia  NEURO: NEGATIVE for weakness, dizziness or paresthesias  PSYCHIATRIC: NEGATIVE for changes in mood or affect     OBJECTIVE:   /83   Pulse 80   Wt 56.7 kg (125 lb)   LMP 07/17/2013   BMI 21.46 kg/m    EXAM:  GENERAL APPEARANCE: healthy, alert and no distress  RESP: lungs clear to auscultation - no rales, rhonchi or wheezes  CV: regular rate and rhythm, normal S1 S2, no S3 or S4, no murmur, click or rub, no peripheral edema and peripheral pulses strong  ABDOMEN: soft, nontender, no hepatosplenomegaly, no masses and bowel sounds normal  MS: no musculoskeletal defects are noted and gait is age appropriate without ataxia  SKIN: no suspicious lesions or rashes  NEURO: Normal strength and tone, sensory exam grossly normal, mentation intact and speech normal  DIABETIC FOOT EXAM: normal DP and PT pulses, no trophic changes or ulcerative lesions and normal sensory exam  PSYCH: mentation appears normal and affect normal/bright    Diagnostic Test Results:  Labs reviewed in Epic    ASSESSMENT/PLAN:       ICD-10-CM    1. Type 2 diabetes mellitus without complication, unspecified whether long term insulin use (H)  E11.9 INFLUENZA VACCINE IM >6 MO VALENT IIV4 (AFLURIA/FLUZONE)     Comprehensive metabolic panel     Hemoglobin A1c     CBC with platelets     Albumin Random Urine Quantitative with Creat Ratio     Adult Eye  Referral      2. Hypercholesteremia  E78.00 Lipid Profile      3. Routine general medical examination at a health care facility  Z00.00 Colonoscopy Screening  Referral          Patient has been advised of split billing requirements and indicates understanding: Yes  COUNSELING:  "  Reviewed preventive health counseling, as reflected in patient instructions       Regular exercise       Healthy diet/nutrition    Estimated body mass index is 21.46 kg/m  as calculated from the following:    Height as of 4/11/22: 1.626 m (5' 4\").    Weight as of this encounter: 56.7 kg (125 lb).        She reports that she has never smoked. She has never used smokeless tobacco.      Counseling Resources:  ATP IV Guidelines  Pooled Cohorts Equation Calculator  Breast Cancer Risk Calculator  BRCA-Related Cancer Risk Assessment: FHS-7 Tool  FRAX Risk Assessment  ICSI Preventive Guidelines  Dietary Guidelines for Americans, 2010  USDA's MyPlate  ASA Prophylaxis  Lung CA Screening    Ariane Varela MD  [facility] [place]    "

## 2022-10-20 ENCOUNTER — TELEPHONE (OUTPATIENT)
Dept: GASTROENTEROLOGY | Facility: CLINIC | Age: 62
End: 2022-10-20

## 2022-10-20 NOTE — TELEPHONE ENCOUNTER
Screening Questions  BLUE  KIND OF PREP RED  LOCATION [review exclusion criteria] GREEN  SEDATION TYPE        Y Are you active on mychart?       Ariane Varela MD in [facility] IN WOMEN IM Ordering/Referring Provider?        UCARE What type of coverage do you have?      N Have you had a positive covid test in the last 90 days?     22.1 1. BMI  [BMI 40+ - review exclusion criteria]    Y  2. Are you able to give consent for your medical care? [IF NO,RN REVIEW]        N  3. Are you taking any prescription pain medications on a routine schedule?        3a. EXTENDED PREP What kind of prescription?     N 4. Do you have any chemical dependencies such as alcohol, street drugs, or methadone?    N 5. Do you have any history of post-traumatic stress syndrome, severe anxiety or history of psychosis?      **If yes 3- 5 , please schedule with MAC sedation.**          IF YES TO ANY 6 - 10 - HOSPITAL SETTING ONLY.     N 6.   Do you need assistance transferring?     N 7.   Have you had a heart or lung transplant?    N 8.   Are you currently on dialysis?   N 9.   Do you use daily home oxygen?   N 10. Do you take nitroglycerin?   10a.  If yes, how often?     11. [FEMALES]  N Are you currently pregnant?    11a.  If yes, how many weeks? [ Greater than 12 weeks, OR NEEDED]    N 12. Do you have Pulmonary Hypertension? *NEED PAC APPT AT [facility]*     N 13. [review exclusion criteria]  Do you have any implantable devices in your body (pacemaker, defib, LVAD)?    N 14. In the past 6 months, have you had any heart related issues including cardiomyopathy or heart attack?     14a.  If yes, did it require cardiac stenting if so when?     N 15. Have you had a stroke or Transient ischemic attack (TIA - aka  mini stroke ) within 6 months?      N 16. Do you have mod to severe Obstructive Sleep Apnea?  [Hospital only - Ok at [facility]]    N 17. Do you have SEVERE AND UNCONTROLLED asthma? *NEED PAC APPT AT [facility]*     N 18. Are you currently  "taking any blood thinners?     18a. If yes, inform patient to \"follow up w/ ordering provider for bridging instructions.\"    N 19. Do you take the medication Phentermine?    19a. If yes, \"Hold for 7 days before procedure.  Please consult your prescribing provider if you have questions about holding this medication.\"     N  20. Do you have chronic kidney disease?      Y  21. Do you have a diagnosis of diabetes?     N  22. On a regular basis do you go 3-5 days between bowel movements?      23. Preferred LOCAL Pharmacy for Pre Prescription    [ LIST ONLY ONE PHARMACY]        [facility] [id number] IN [place], [place] - [address]        - CLOSING REMINDERS -    Informed patient they will need an adult    Cannot take any type of public or medical transportation alone    Conscious Sedation- Needs  for 6 hours after the procedure       MAC/General-Needs  for 24 hours after procedure    Pre-Procedure Covid test to be completed [[facility] PCR Testing Required]    Confirmed Nurse will call to complete assessment       - SCHEDULING DETAILS -     Juan Daniel  Surgeon    01/02/22  Date of Procedure  Lower Endoscopy [Colonoscopy]  Type of Procedure Scheduled   [facility] [place] [facility]  [facility] PREP-If you answer yes to questions #8, #20, #21Which Colonoscopy Prep was Sent?     CS Sedation Type     N PAC / Pre-op Required         Additional comments:            "

## 2022-11-17 ENCOUNTER — TELEPHONE (OUTPATIENT)
Dept: GASTROENTEROLOGY | Facility: CLINIC | Age: 62
End: 2022-11-17

## 2022-11-17 NOTE — TELEPHONE ENCOUNTER
Caller: Krish Renee      Procedure: Colon    Date, Location, and Surgeon of Procedure Cancelled: 1/2,TERRI Frances        Reason for cancel (please be detailed, any staff messages or encounters to note?): Provider out        Rescheduled: y     If rescheduled:    Date: 2/1   [place] [facility] Resent: y(changes to prep?)   Covid Test Rescheduled: Home test   Note any change or update to original order/sedation:

## 2022-11-18 ENCOUNTER — TELEPHONE (OUTPATIENT)
Dept: INTERNAL MEDICINE | Facility: CLINIC | Age: 62
End: 2022-11-18

## 2022-11-18 ENCOUNTER — OFFICE VISIT (OUTPATIENT)
Dept: INTERNAL MEDICINE | Facility: CLINIC | Age: 62
End: 2022-11-18
Payer: COMMERCIAL

## 2022-11-18 VITALS
HEIGHT: 64 IN | OXYGEN SATURATION: 95 % | SYSTOLIC BLOOD PRESSURE: 116 MMHG | TEMPERATURE: 97.7 F | DIASTOLIC BLOOD PRESSURE: 69 MMHG | WEIGHT: 128.6 LBS | RESPIRATION RATE: 20 BRPM | HEART RATE: 73 BPM | BODY MASS INDEX: 21.95 KG/M2

## 2022-11-18 DIAGNOSIS — Z23 ENCOUNTER FOR VACCINATION: ICD-10-CM

## 2022-11-18 DIAGNOSIS — R10.32 LEFT LOWER QUADRANT PAIN: Primary | ICD-10-CM

## 2022-11-18 PROCEDURE — 99214 OFFICE O/P EST MOD 30 MIN: CPT | Mod: 25 | Performed by: INTERNAL MEDICINE

## 2022-11-18 PROCEDURE — 0124A COVID-19,PF,PFIZER BOOSTER BIVALENT: CPT | Performed by: INTERNAL MEDICINE

## 2022-11-18 PROCEDURE — 91312 COVID-19,PF,PFIZER BOOSTER BIVALENT: CPT | Performed by: INTERNAL MEDICINE

## 2022-11-18 NOTE — TELEPHONE ENCOUNTER
LVM for patient to schedule CT scan and lab orders placed by Dr. Varela.  Patient was provided with contact information to schedule appointments.

## 2022-11-18 NOTE — NURSING NOTE
Krish Renee is a 62 year old female that presents in clinic today for the following:     Chief Complaint   Patient presents with     Abdominal Pain     Off and on abdominal pain in lower quadrants for about a year. LLQ is always tender. Normal bowel movements.        The patient's allergies and medications were reviewed. The patient's vitals were obtained without incident. The patient does not have any other questions for the provider.     Fabián Espinoza, EMT at 8:23 AM on 11/18/2022.  Primary Care Clinic: [phone]

## 2022-11-18 NOTE — PROGRESS NOTES
Assessment & Plan     Left lower quadrant pain  Discussed possible causes of abdominal pain with patient. Will hold off on diagnostic colonoscopy at this time - patient is scheduled for screening procedure early next year. Recommend additional evaluation with CT of the abdomen and pelvis. May consider diagnostic colonoscopy if CT scan is concerning for colon pathology. Patient has known calcified uterine fibroid, not likely to contribute to pain. Patient will be advised on test results accordingly.   - CT Abdomen Pelvis w/o & w Contrast; Future  - Creatinine; Future      I spent a total of 30 minutes on the day of the visit.   Time spent doing chart review, history and exam, documentation and further activities per the note           No follow-ups on file.    Ariane Varela MD  [facility] INTERNAL MEDICINE BRANDI Rutherford is a 62 year old, presenting for the following health issues:  Abdominal Pain (Off and on abdominal pain in lower quadrants for about a year. LLQ is always tender. Normal bowel movements. )      HPI     Patient reports that she has been dealing with lower abdominal pain over the last year. Pain is localized to the left lower quadrant for the most past. She states that wearing tight leggings is difficult because if causes pain after some time. She denies constipation or diarrhea. She denies blood in the stool. She denies unusual vaginal discharge or bleeding. She is scheduled for colonoscopy in February. She describes the pain as cramping in quality. She states that she has noticed that pain can get worse with standing up and trying to walk. It can be 2-3/10 intensity, but can be up to 6-7/10 at its worse. Patient has not been able to find any alleviating factors. Pain seems to get better with walking. Pain does not change with bowel movement. She denies blood in urine.     Review of Systems   Constitutional, HEENT, cardiovascular, pulmonary, GI, ,  "musculoskeletal, neuro, skin, endocrine and psych systems are negative, except as otherwise noted.      Objective    /69 (BP Location: Left arm, Patient Position: Sitting, Cuff Size: Adult Regular)   Pulse 73   Temp 97.7  F (36.5  C) (Oral)   Resp 20   Ht 1.626 m (5' 4\")   Wt 58.3 kg (128 lb 9.6 oz)   LMP 07/17/2013   SpO2 95%   BMI 22.07 kg/m    Body mass index is 22.07 kg/m .  Physical Exam   GENERAL: healthy, alert and no distress  EYES: Eyes grossly normal to inspection, PERRL and conjunctivae and sclerae normal  RESP: lungs clear to auscultation - no rales, rhonchi or wheezes  CV: regular rate and rhythm, normal S1 S2, no S3 or S4, no murmur, click or rub, no peripheral edema and peripheral pulses strong  ABDOMEN: bowel sounds normal and soft, mild tenderness on palpation left lower quadrant  MS: no gross musculoskeletal defects noted, no edema                    "

## 2022-11-18 NOTE — PATIENT INSTRUCTIONS
To schedule a CT scan, please call radiology scheduling at [phone].     To schedule an lab appointment at the [facility], please call [phone].

## 2022-11-28 ENCOUNTER — LAB (OUTPATIENT)
Dept: LAB | Facility: CLINIC | Age: 62
End: 2022-11-28
Payer: COMMERCIAL

## 2022-11-28 DIAGNOSIS — E78.00 HYPERCHOLESTEREMIA: ICD-10-CM

## 2022-11-28 DIAGNOSIS — R10.32 LEFT LOWER QUADRANT PAIN: ICD-10-CM

## 2022-11-28 LAB
CREAT SERPL-MCNC: 0.61 MG/DL (ref 0.51–0.95)
GFR SERPL CREATININE-BSD FRML MDRD: >90 ML/MIN/1.73M2

## 2022-11-28 PROCEDURE — 36415 COLL VENOUS BLD VENIPUNCTURE: CPT | Performed by: PATHOLOGY

## 2022-11-28 PROCEDURE — 82565 ASSAY OF CREATININE: CPT | Performed by: PATHOLOGY

## 2022-11-29 ENCOUNTER — ANCILLARY PROCEDURE (OUTPATIENT)
Dept: CT IMAGING | Facility: CLINIC | Age: 62
End: 2022-11-29
Attending: INTERNAL MEDICINE
Payer: COMMERCIAL

## 2022-11-29 DIAGNOSIS — R10.32 LEFT LOWER QUADRANT PAIN: ICD-10-CM

## 2022-11-29 PROCEDURE — 74177 CT ABD & PELVIS W/CONTRAST: CPT | Performed by: RADIOLOGY

## 2022-11-29 RX ORDER — IOPAMIDOL 755 MG/ML
78 INJECTION, SOLUTION INTRAVASCULAR ONCE
Status: DISCONTINUED | OUTPATIENT
Start: 2022-11-29 | End: 2022-11-29

## 2022-11-29 RX ORDER — IOPAMIDOL 755 MG/ML
74 INJECTION, SOLUTION INTRAVASCULAR ONCE
Status: COMPLETED | OUTPATIENT
Start: 2022-11-29 | End: 2022-11-29

## 2022-11-29 RX ADMIN — IOPAMIDOL 74 ML: 755 INJECTION, SOLUTION INTRAVASCULAR at 07:39

## 2022-11-30 ENCOUNTER — OFFICE VISIT (OUTPATIENT)
Dept: OPHTHALMOLOGY | Facility: CLINIC | Age: 62
End: 2022-11-30
Attending: INTERNAL MEDICINE
Payer: COMMERCIAL

## 2022-11-30 DIAGNOSIS — E11.9 TYPE 2 DIABETES MELLITUS WITHOUT RETINOPATHY (H): Primary | ICD-10-CM

## 2022-11-30 DIAGNOSIS — H25.13 SENILE NUCLEAR SCLEROSIS, BILATERAL: ICD-10-CM

## 2022-11-30 DIAGNOSIS — H02.886 MEIBOMIAN GLAND DYSFUNCTION (MGD) OF BOTH EYES: ICD-10-CM

## 2022-11-30 DIAGNOSIS — H02.883 MEIBOMIAN GLAND DYSFUNCTION (MGD) OF BOTH EYES: ICD-10-CM

## 2022-11-30 DIAGNOSIS — H52.13 MYOPIA OF BOTH EYES: ICD-10-CM

## 2022-11-30 PROCEDURE — 92015 DETERMINE REFRACTIVE STATE: CPT | Performed by: OPTOMETRIST

## 2022-11-30 PROCEDURE — 92014 COMPRE OPH EXAM EST PT 1/>: CPT | Performed by: OPTOMETRIST

## 2022-11-30 ASSESSMENT — REFRACTION_WEARINGRX
OD_ADD: +2.50
OD_AXIS: 018
OS_AXIS: 146
OD_CYLINDER: +0.50
OS_CYLINDER: +0.25
OS_SPHERE: -2.25
OD_SPHERE: -2.50
OS_ADD: +2.50
SPECS_TYPE: SVL

## 2022-11-30 ASSESSMENT — TONOMETRY
OS_IOP_MMHG: 15
OD_IOP_MMHG: 13
IOP_METHOD: ICARE

## 2022-11-30 ASSESSMENT — CONF VISUAL FIELD
OS_NORMAL: 1
OS_INFERIOR_TEMPORAL_RESTRICTION: 0
METHOD: COUNTING FINGERS
OS_SUPERIOR_NASAL_RESTRICTION: 0
OD_INFERIOR_TEMPORAL_RESTRICTION: 0
OS_SUPERIOR_TEMPORAL_RESTRICTION: 0
OS_INFERIOR_NASAL_RESTRICTION: 0
OD_INFERIOR_NASAL_RESTRICTION: 0
OD_NORMAL: 1
OD_SUPERIOR_NASAL_RESTRICTION: 0
OD_SUPERIOR_TEMPORAL_RESTRICTION: 0

## 2022-11-30 ASSESSMENT — EXTERNAL EXAM - RIGHT EYE: OD_EXAM: NORMAL

## 2022-11-30 ASSESSMENT — REFRACTION_MANIFEST
OS_ADD: +2.50
OD_ADD: +2.50
OS_SPHERE: -2.25
OD_AXIS: 015
OS_CYLINDER: +0.25
OS_AXIS: 150
OD_SPHERE: -2.50
OD_CYLINDER: +0.75

## 2022-11-30 ASSESSMENT — VISUAL ACUITY
OD_CC: 20/20
OS_CC: 20/20
METHOD: SNELLEN - LINEAR

## 2022-11-30 ASSESSMENT — CUP TO DISC RATIO
OD_RATIO: 0.2
OS_RATIO: 0.2

## 2022-11-30 ASSESSMENT — EXTERNAL EXAM - LEFT EYE: OS_EXAM: NORMAL

## 2022-11-30 ASSESSMENT — SLIT LAMP EXAM - LIDS
COMMENTS: 2+ MEIBOMIAN GLAND DYSFUNCTION
COMMENTS: 2+ MEIBOMIAN GLAND DYSFUNCTION

## 2022-11-30 NOTE — NURSING NOTE
Chief Complaints and History of Present Illnesses   Patient presents with     Diabetic Eye Exam     Chief Complaint(s) and History of Present Illness(es)     Diabetic Eye Exam            Vision: is stable    Associated symptoms: Negative for flashes and floaters    Diabetes Type: Type 2, on insulin and taking oral medications    Treatments tried: no treatments and glasses    Pain scale: 0/10          Comments    Pt here today for annual routine diabetic eye examination.  DM2 treated with oral meds & insulin. BS = pt does not check  PROMISE was here last year 10/20/21.  States vision has been stable.  Last A1C Lab Results       Component                Value               Date                       A1C                      6.8                 10/14/2022                 A1C                      6.3                 09/24/2021                 A1C                      6.7                 06/21/2021                 A1C                      6.3                 09/15/2020                 A1C                      6.3                 12/07/2018                 A1C                      6.6                 05/15/2018                 A1C                      6.1                 09/25/2017              Ocular meds = none    Becky SOTO, ELEONORA 8:30 AM 11/30/2022

## 2022-11-30 NOTE — PROGRESS NOTES
Assessment/Plan  (E11.9) Type 2 diabetes mellitus without retinopathy (H)  (primary encounter diagnosis)  Plan: Discussed findings with patient. Encouraged continued blood glucose, pressure, and lipid control. Patient should continue following recommendations of primary care provider. Patient should plan on returning to clinic annually for a dilated eye exam but was encouraged to return to clinic sooner with new flashes/floaters or other vision changes.     (H25.13) Senile nuclear sclerosis, bilateral  Comment: Not visually significant  Plan: Monitor.     (H02.883,  H02.886) Meibomian gland dysfunction (MGD) of both eyes  Comment: Possible source of mild vision fluctuations  Plan: Recommend daily hot compresses with lid massage. Consider using a lubricating drop such as Refresh or Systane 1-2 times daily as well.     (H52.13) Myopia of both eyes  Plan: REFRACTION [[id number]]        Discussed findings with patient. New spectacle prescription dispensed to patient. Patient is welcome to return to clinic with prolonged adaptation difficulties.       Complete documentation of historical and exam elements from today's encounter can  be found in the full encounter summary report (not reduplicated in this progress  note). I personally obtained the chief complaint(s) and history of present illness. I  confirmed and edited as necessary the review of systems, past medical/surgical  history, family history, social history, and examination findings as documented by  others; and I examined the patient myself. I personally reviewed the relevant tests,  images, and reports as documented above. I formulated and edited as necessary the  assessment and plan and discussed the findings and management plan with the  patient and family.    Elia Jaffe OD

## 2022-12-23 ENCOUNTER — ANCILLARY PROCEDURE (OUTPATIENT)
Dept: MAMMOGRAPHY | Facility: CLINIC | Age: 62
End: 2022-12-23
Attending: INTERNAL MEDICINE
Payer: COMMERCIAL

## 2022-12-23 DIAGNOSIS — Z12.31 VISIT FOR SCREENING MAMMOGRAM: ICD-10-CM

## 2022-12-23 PROCEDURE — 77063 BREAST TOMOSYNTHESIS BI: CPT | Mod: GC | Performed by: RADIOLOGY

## 2022-12-23 PROCEDURE — 77067 SCR MAMMO BI INCL CAD: CPT | Mod: GC | Performed by: RADIOLOGY

## 2023-01-06 ENCOUNTER — MYC MEDICAL ADVICE (OUTPATIENT)
Dept: INTERNAL MEDICINE | Facility: CLINIC | Age: 63
End: 2023-01-06

## 2023-01-06 DIAGNOSIS — E11.9 TYPE 2 DIABETES MELLITUS WITHOUT COMPLICATION, WITH LONG-TERM CURRENT USE OF INSULIN (H): ICD-10-CM

## 2023-01-06 DIAGNOSIS — Z79.4 TYPE 2 DIABETES MELLITUS WITHOUT COMPLICATION, WITH LONG-TERM CURRENT USE OF INSULIN (H): ICD-10-CM

## 2023-01-06 NOTE — TELEPHONE ENCOUNTER
Medication refill protocol passed. Pt last seen in clinic 11/18/2022. Pt last had 360 tablets filled in 2021 with 4 refills. Pt sent message on AMEE confirming dose of metformin as pt sig on prescription reads two different scripts. RN awaiting MyChart from patient.    HALEY CANTRELL RN on 1/6/2023 at 4:27 PM

## 2023-01-09 RX ORDER — METFORMIN HCL 500 MG
TABLET, EXTENDED RELEASE 24 HR ORAL
Qty: 360 TABLET | Refills: 3 | Status: SHIPPED | OUTPATIENT
Start: 2023-01-09 | End: 2024-01-28

## 2023-01-09 NOTE — TELEPHONE ENCOUNTER
Pt reports taking 2000 mg per day of metformin. This information sent to Dr. Varela for refill as the prescription needs updating to correct dose instructions.     HALEY CANTRELL RN on 1/9/2023 at 7:11 AM

## 2023-01-09 NOTE — TELEPHONE ENCOUNTER
Please see note on refill request. Prescription refill request sent to Dr. Varela for approval.     HALEY CANTRELL RN on 1/9/2023 at 7:21 AM

## 2023-01-19 ENCOUNTER — TELEPHONE (OUTPATIENT)
Dept: GASTROENTEROLOGY | Facility: CLINIC | Age: 63
End: 2023-01-19
Payer: COMMERCIAL

## 2023-01-19 DIAGNOSIS — Z12.11 ENCOUNTER FOR SCREENING COLONOSCOPY: Primary | ICD-10-CM

## 2023-01-19 RX ORDER — BISACODYL 5 MG/1
TABLET, DELAYED RELEASE ORAL
Qty: 4 TABLET | Refills: 0 | Status: SHIPPED | OUTPATIENT
Start: 2023-01-19 | End: 2023-06-30

## 2023-01-19 NOTE — TELEPHONE ENCOUNTER
Attempted to contact patient regarding upcoming Colonoscopy  procedure on 2/1/2023 for pre assessment questions. No answer.     Left message to return call to [phone] #4    Diabetic? Yes - Patient to hold oral diabetic medications day of procedure    Arina Roman RN  Endoscopy Procedure Pre Assessment RN            Patient scheduled for Colonoscopy  on 2/1/2023.     Discuss Covid policy.     Pre op exam scheduled: N/A    Arrival time: 0745    [facility]; [address] [place] [address], [address] Floor, [place] [address]    Sedation type: Conscious sedation     Anticoagulations? No    Electronic implanted devices? No    Diabetic? Yes - Patient to hold oral diabetic medications day of procedure    Indication for procedure: screening    Bowel prep recommendation: Standard Golytely     Prep instructions sent via [email or web address]     Bowel prep script sent to    [facility] [id number] [place], [place] - [address] [place] [address]    Pre visit planning completed.    Arina Roman RN  Endoscopy Procedure Pre Assessment RN

## 2023-01-19 NOTE — TELEPHONE ENCOUNTER
Pt returned call.     Pre assessment questions completed for upcoming Colonoscopy  procedure scheduled on 2/1/23    COVID policy reviewed.     Reviewed procedural arrival time 0745 and facility location [facility]; [address] [place] [address], [address] Floor, [place] [address]    Designated  policy reviewed. Instructed to have someone stay 6 hours post procedure.     Diabetic? Yes - Patient to hold oral diabetic medications day of procedure    Reviewed procedure prep instructions.     Bowel prep script sent to    [facility] [id number] [place] [facility] - [place], [place] - [address] [facility]    Patient verbalized understanding and had no questions or concerns at this time.    Vianey Og RN  Endoscopy Procedure Pre Assessment RN

## 2023-02-01 ENCOUNTER — MYC REFILL (OUTPATIENT)
Dept: OBGYN | Facility: CLINIC | Age: 63
End: 2023-02-01

## 2023-02-01 ENCOUNTER — HOSPITAL ENCOUNTER (OUTPATIENT)
Facility: AMBULATORY SURGERY CENTER | Age: 63
Discharge: HOME | End: 2023-02-01
Attending: SURGERY | Admitting: SURGERY
Payer: COMMERCIAL

## 2023-02-01 VITALS
DIASTOLIC BLOOD PRESSURE: 60 MMHG | WEIGHT: 128 LBS | TEMPERATURE: 98 F | HEIGHT: 64 IN | RESPIRATION RATE: 16 BRPM | HEART RATE: 71 BPM | BODY MASS INDEX: 21.85 KG/M2 | SYSTOLIC BLOOD PRESSURE: 104 MMHG | OXYGEN SATURATION: 100 %

## 2023-02-01 DIAGNOSIS — Z80.0 FAMILY HISTORY OF COLON CANCER: Primary | ICD-10-CM

## 2023-02-01 DIAGNOSIS — F33.1 MAJOR DEPRESSIVE DISORDER, RECURRENT EPISODE, MODERATE (H): ICD-10-CM

## 2023-02-01 LAB
COLONOSCOPY: NORMAL
GLUCOSE BLDC GLUCOMTR-MCNC: 110 MG/DL (ref 70–99)
GLUCOSE BLDC GLUCOMTR-MCNC: 86 MG/DL (ref 70–99)

## 2023-02-01 PROCEDURE — 45385 COLONOSCOPY W/LESION REMOVAL: CPT | Mod: 33

## 2023-02-01 PROCEDURE — 82962 GLUCOSE BLOOD TEST: CPT | Performed by: PATHOLOGY

## 2023-02-01 PROCEDURE — 88305 TISSUE EXAM BY PATHOLOGIST: CPT | Mod: 26 | Performed by: PATHOLOGY

## 2023-02-01 PROCEDURE — 88305 TISSUE EXAM BY PATHOLOGIST: CPT | Mod: TC | Performed by: SURGERY

## 2023-02-01 RX ORDER — ONDANSETRON 2 MG/ML
4 INJECTION INTRAMUSCULAR; INTRAVENOUS
Status: DISCONTINUED | OUTPATIENT
Start: 2023-02-01 | End: 2023-02-02 | Stop reason: HOSPADM

## 2023-02-01 RX ORDER — LIDOCAINE 40 MG/G
CREAM TOPICAL
Status: DISCONTINUED | OUTPATIENT
Start: 2023-02-01 | End: 2023-02-02 | Stop reason: HOSPADM

## 2023-02-01 RX ORDER — FENTANYL CITRATE 50 UG/ML
INJECTION, SOLUTION INTRAMUSCULAR; INTRAVENOUS PRN
Status: DISCONTINUED | OUTPATIENT
Start: 2023-02-01 | End: 2023-02-01 | Stop reason: HOSPADM

## 2023-02-01 RX ORDER — SODIUM CHLORIDE, SODIUM LACTATE, POTASSIUM CHLORIDE, CALCIUM CHLORIDE 600; 310; 30; 20 MG/100ML; MG/100ML; MG/100ML; MG/100ML
INJECTION, SOLUTION INTRAVENOUS CONTINUOUS
Status: DISCONTINUED | OUTPATIENT
Start: 2023-02-01 | End: 2023-02-02 | Stop reason: HOSPADM

## 2023-02-01 NOTE — TELEPHONE ENCOUNTER
"Requested Prescriptions   Pending Prescriptions Disp Refills     FLUoxetine (PROZAC) 20 MG capsule 180 capsule 3     Sig: Take 3 capsules (60 mg) by mouth daily Take 1 capsule (10 mg) by mouth daily. 1/2 tablet each morning with food for 10 days, then 1 tablet by mouth each morning.       SSRIs Protocol Failed - 2/1/2023  1:05 PM        Failed - Recent (6 mo) or future (30 days) visit within the authorizing provider's specialty     Patient had office visit in the last 6 months or has a visit in the next 30 days with authorizing provider or within the authorizing provider's specialty.  See \"Patient Info\" tab in inbasket, or \"Choose Columns\" in Meds & Orders section of the refill encounter.            Passed - PHQ-9 score less than 5 in past 6 months     Please review last PHQ-9 score.           Passed - Medication is active on med list        Passed - Patient is age 18 or older        Passed - No active pregnancy on record        Passed - No positive pregnancy test in last 12 months           Last Written Prescription Date:  8/3/22  Last Fill Quantity: 180,  # refills: 3   Last office visit: 12/3/2021 with prescribing provider:  Dr. Christine   Future Office Visit:    None    Medication is being filled for 1 time refill only due to:  Patient needs to be seen because it has been more than one year since last visit.   Susan Fam RN on 2/1/2023 at 1:51 PM          "

## 2023-02-02 LAB
PATH REPORT.COMMENTS IMP SPEC: NORMAL
PATH REPORT.COMMENTS IMP SPEC: NORMAL
PATH REPORT.FINAL DX SPEC: NORMAL
PATH REPORT.GROSS SPEC: NORMAL
PATH REPORT.MICROSCOPIC SPEC OTHER STN: NORMAL
PATH REPORT.RELEVANT HX SPEC: NORMAL
PHOTO IMAGE: NORMAL

## 2023-02-26 RX ORDER — ATORVASTATIN CALCIUM 40 MG/1
TABLET, FILM COATED ORAL
Qty: 90 TABLET | Refills: 3 | OUTPATIENT
Start: 2023-02-26

## 2023-03-26 ENCOUNTER — HEALTH MAINTENANCE LETTER (OUTPATIENT)
Age: 63
End: 2023-03-26

## 2023-04-18 ENCOUNTER — TELEPHONE (OUTPATIENT)
Dept: INTERNAL MEDICINE | Facility: CLINIC | Age: 63
End: 2023-04-18

## 2023-04-18 ENCOUNTER — LAB (OUTPATIENT)
Dept: LAB | Facility: CLINIC | Age: 63
End: 2023-04-18
Payer: COMMERCIAL

## 2023-04-18 DIAGNOSIS — R30.0 DYSURIA: Primary | ICD-10-CM

## 2023-04-18 DIAGNOSIS — R30.0 DYSURIA: ICD-10-CM

## 2023-04-18 LAB
ALBUMIN UR-MCNC: NEGATIVE MG/DL
APPEARANCE UR: CLEAR
BACTERIA #/AREA URNS HPF: ABNORMAL /HPF
BILIRUB UR QL STRIP: NEGATIVE
COLOR UR AUTO: ABNORMAL
GLUCOSE UR STRIP-MCNC: NEGATIVE MG/DL
HGB UR QL STRIP: ABNORMAL
KETONES UR STRIP-MCNC: NEGATIVE MG/DL
LEUKOCYTE ESTERASE UR QL STRIP: ABNORMAL
MUCOUS THREADS #/AREA URNS LPF: PRESENT /LPF
NITRATE UR QL: NEGATIVE
PH UR STRIP: 5 [PH] (ref 5–7)
RBC URINE: 1 /HPF
SP GR UR STRIP: 1.01 (ref 1–1.03)
SQUAMOUS EPITHELIAL: 3 /HPF
TRANSITIONAL EPI: <1 /HPF
UROBILINOGEN UR STRIP-MCNC: NORMAL MG/DL
WBC CLUMPS #/AREA URNS HPF: PRESENT /HPF
WBC URINE: 31 /HPF

## 2023-04-18 PROCEDURE — 87086 URINE CULTURE/COLONY COUNT: CPT | Mod: 90 | Performed by: PATHOLOGY

## 2023-04-18 PROCEDURE — 81001 URINALYSIS AUTO W/SCOPE: CPT | Performed by: PATHOLOGY

## 2023-04-18 PROCEDURE — 99000 SPECIMEN HANDLING OFFICE-LAB: CPT | Performed by: PATHOLOGY

## 2023-04-18 NOTE — TELEPHONE ENCOUNTER
"[facility] Call Center    Phone Message    May a detailed message be left on voicemail: yes     Reason for Call: Symptoms or Concerns     If patient has red-flag symptoms, warm transfer to triage line    Current symptom or concern: Possible UTI- pressure when urinating    Symptoms have been present for: 24 hour(s)    Has patient previously been seen for this? Yes    Date: \"Decades ago\"    Are there any new or worsening symptoms? Yes: A little worse today, was fine overnight. Patient is requesting a UA test to be ordered so she can get some antibiotics.       Action Taken: Message routed to:  [facility] ([facility]    Travel Screening: Not Applicable                                                                      "

## 2023-04-18 NOTE — TELEPHONE ENCOUNTER
Spoke with patient, ordered UA/UC and scheduled a video appt with Dr. Andrade 4/19/23 @ 6:00pm    Janey Clemons RN on 4/18/2023 at 12:58 PM

## 2023-04-19 ENCOUNTER — VIRTUAL VISIT (OUTPATIENT)
Dept: FAMILY MEDICINE | Facility: CLINIC | Age: 63
End: 2023-04-19
Payer: COMMERCIAL

## 2023-04-19 DIAGNOSIS — N30.00 ACUTE CYSTITIS WITHOUT HEMATURIA: Primary | ICD-10-CM

## 2023-04-19 PROCEDURE — 99213 OFFICE O/P EST LOW 20 MIN: CPT | Mod: VID | Performed by: FAMILY MEDICINE

## 2023-04-19 RX ORDER — SULFAMETHOXAZOLE/TRIMETHOPRIM 800-160 MG
1 TABLET ORAL 2 TIMES DAILY
Qty: 14 TABLET | Refills: 0 | Status: SHIPPED | OUTPATIENT
Start: 2023-04-19

## 2023-04-19 NOTE — PROGRESS NOTES
"Virtual Visit Details    Type of service:  Video Visit     Originating Location (pt. Location): Home    Distant Location (provider location):  On-site  Platform used for Video Visit: Rom Rutherford is a 62 year old who is being evaluated via a billable video visit.      How would you like to obtain your AVS? MyChart  If the video visit is dropped, the invitation should be resent by: Text to cell phone: [phone]  Will anyone else be joining your video visit? No  Start  6:04  Stop  6:16      Assessment & Plan     Acute cystitis without gross hematuria  Pt having acute symptoms - UA looks infected - UC pending - will treat with Bactrim DS - pt aware sensitivities are pending.    - sulfamethoxazole-trimethoprim (BACTRIM DS) 800-160 MG tablet  Dispense: 14 tablet; Refill: 0    Prn follow up     Stephanie Andrade MD PhD  [facility] BRANDI Rutherford is a 62 year old, presenting for the following health issues:  Video Visit          HPI 63 yo diabetic with probable UTI -  Having pressure and burning with urination.  No donnie  blood in urine, no fever, no back pain.  Not had UTI in 25 years.  No hot tub -     She is diabetic  And also  has hypercholesterolemia.      No drug allergies       Review of Systems   Constitutional, HEENT, cardiovascular, pulmonary, gi and gu systems are negative, except as otherwise noted.      Objective    Vitals - Patient Reported  Systolic (Patient Reported):  (Not today)  Weight (Patient Reported): 57.2 kg (126 lb)  Height (Patient Reported): 160 cm (5' 3\")  BMI (Based on Pt Reported Ht/Wt): 22.32  Pain Score: Mild Pain (2)  Pain Loc: Other - see comment (Urinary track)        Physical Exam   GENERAL: Healthy, alert and no distress  EYES: Eyes grossly normal to inspection.  No discharge or erythema, or obvious scleral/conjunctival abnormalities.  RESP: No audible wheeze, cough, or visible cyanosis.  No visible retractions or increased work of " breathing.    SKIN: Visible skin clear. No significant rash, abnormal pigmentation or lesions.  NEURO: Cranial nerves grossly intact.  Mentation and speech appropriate for age.  PSYCH: Mentation appears normal, affect normal/bright, judgement and insight intact, normal speech and appearance well-groomed.    UA reviewed and UC pending   -     Time note (e3, 20'): The total of my time (on the date of service) for this service was 20 minutes, including discussion/face-to-face, chart review, interpretation not otherwise reported, documentation, and updating of the computerized record.          Video-Visit Details    Type of service:  Video Visit     Originating Location (pt. Location): Home  Distant Location (provider location):  On-site  Platform used for Video Visit: Rom Andrade MD, PhD    4/20/23  UC showed >100,000 mixed mariusz - but pt was having symptoms - so I called her and told her to take the bactrim for the 7 day course. And I told her sensitivities will not be done.  Stephanie Andrade MD, PhD

## 2023-04-19 NOTE — NURSING NOTE
Is the patient currently in the state of [place]? YES    Visit mode:VIDEO    If the visit is dropped, the patient can be reconnected by: VIDEO VISIT: Send to e-mail at: [email or web address]    Will anyone else be joining the visit? NO      How would you like to obtain your AVS? MyChart    Are changes needed to the allergy or medication list? YES: Remove Dulcolax, Golytely.    Reason for visit: Video Visit    ASHRA URIAS

## 2023-04-20 LAB — BACTERIA UR CULT: NORMAL

## 2023-05-13 DIAGNOSIS — E11.9 TYPE 2 DIABETES MELLITUS WITHOUT COMPLICATION, UNSPECIFIED WHETHER LONG TERM INSULIN USE (H): ICD-10-CM

## 2023-05-15 RX ORDER — INSULIN GLARGINE 100 [IU]/ML
10 INJECTION, SOLUTION SUBCUTANEOUS DAILY
Qty: 15 ML | Refills: 4 | Status: SHIPPED | OUTPATIENT
Start: 2023-05-15 | End: 2024-04-22

## 2023-05-15 NOTE — TELEPHONE ENCOUNTER
BASAGLAR 100 UNIT/ML KWIKPEN  Last Written Prescription Date:   4/7/2022  Last Fill Quantity: 15,   # refills: 4  Last Office Visit :  4/19/2023  Future Office visit:  None    Routing refill request to provider for review/approval because:  Abnormal A1C  Refer to clinic for review     Recent Labs   Lab Test 10/14/22  1111 09/15/20  1045 05/29/19  0000   A1C 6.8*   < >  --    HEMOGLOBINA1  --   --  5.9       Bharti Stallings RN  Central Triage Red Flags/Med Refills

## 2023-06-30 ENCOUNTER — OFFICE VISIT (OUTPATIENT)
Dept: INTERNAL MEDICINE | Facility: CLINIC | Age: 63
End: 2023-06-30
Payer: COMMERCIAL

## 2023-06-30 ENCOUNTER — LAB (OUTPATIENT)
Dept: LAB | Facility: CLINIC | Age: 63
End: 2023-06-30
Payer: COMMERCIAL

## 2023-06-30 VITALS
BODY MASS INDEX: 21.54 KG/M2 | OXYGEN SATURATION: 95 % | SYSTOLIC BLOOD PRESSURE: 110 MMHG | HEIGHT: 64 IN | HEART RATE: 82 BPM | WEIGHT: 126.2 LBS | DIASTOLIC BLOOD PRESSURE: 72 MMHG

## 2023-06-30 DIAGNOSIS — Z90.81 S/P SPLENECTOMY: ICD-10-CM

## 2023-06-30 DIAGNOSIS — E11.9 TYPE 2 DIABETES MELLITUS WITHOUT COMPLICATION, UNSPECIFIED WHETHER LONG TERM INSULIN USE (H): ICD-10-CM

## 2023-06-30 DIAGNOSIS — H01.005 BLEPHARITIS OF LEFT LOWER EYELID, UNSPECIFIED TYPE: Primary | ICD-10-CM

## 2023-06-30 LAB
ERYTHROCYTE [DISTWIDTH] IN BLOOD BY AUTOMATED COUNT: 14.1 % (ref 10–15)
HBA1C MFR BLD: 6.8 %
HCT VFR BLD AUTO: 41.4 % (ref 35–47)
HGB BLD-MCNC: 13.4 G/DL (ref 11.7–15.7)
MCH RBC QN AUTO: 32 PG (ref 26.5–33)
MCHC RBC AUTO-ENTMCNC: 32.4 G/DL (ref 31.5–36.5)
MCV RBC AUTO: 99 FL (ref 78–100)
PLATELET # BLD AUTO: 418 10E3/UL (ref 150–450)
RBC # BLD AUTO: 4.19 10E6/UL (ref 3.8–5.2)
WBC # BLD AUTO: 9.3 10E3/UL (ref 4–11)

## 2023-06-30 PROCEDURE — 36415 COLL VENOUS BLD VENIPUNCTURE: CPT | Performed by: PATHOLOGY

## 2023-06-30 PROCEDURE — 99000 SPECIMEN HANDLING OFFICE-LAB: CPT | Performed by: PATHOLOGY

## 2023-06-30 PROCEDURE — 99214 OFFICE O/P EST MOD 30 MIN: CPT | Performed by: INTERNAL MEDICINE

## 2023-06-30 PROCEDURE — 85027 COMPLETE CBC AUTOMATED: CPT | Performed by: PATHOLOGY

## 2023-06-30 PROCEDURE — 82306 VITAMIN D 25 HYDROXY: CPT | Performed by: INTERNAL MEDICINE

## 2023-06-30 PROCEDURE — 83036 HEMOGLOBIN GLYCOSYLATED A1C: CPT | Performed by: INTERNAL MEDICINE

## 2023-06-30 NOTE — PROGRESS NOTES
"  Assessment & Plan     Blepharitis of left lower eyelid, unspecified type  Discussed management of blepharitis with patient.  Recommend trying warm water compresses as well as tear free shampoo.  Referral to eye clinic was also placed.  - Adult Eye  Referral; Future    Type 2 diabetes mellitus without complication, unspecified whether long term insulin use (H)  Reviewed diabetes management with patient.  Recommend to continue with current meds without changes at this time.  We will check hemoglobin A1c as well as normal vitamin D level.  Patient will be advised on test results accordingly.  - Hemoglobin A1c; Future  - 25- [place]-Vitamin D; Future    S/P splenectomy  We will check CBC today.  Patient has had elevated platelets due to splenectomy in the past.  - CBC with Platelets; Future      I spent a total of 20 minutes on the day of the visit.   Time spent by me doing chart review, history and exam, documentation and further activities per the note           Return in about 5 months (around 11/30/2023).    Ariane Varela MD  [facility] [place]    Michelle Rutherford is a 62 year old, presenting for the following health issues:  Diabetes (Diabetes check.) and RECHECK (Left eye irritation.)    HPI     Patient reports that she has adjusted the dose of glargin to 14 units and then had to decrease it to 10 units because of the lower blood glucose in the morning.       Review of Systems   Constitutional, HEENT, cardiovascular, pulmonary, GI, , musculoskeletal, neuro, skin, endocrine and psych systems are negative, except as otherwise noted.      Objective    /72 (BP Location: Right arm, Patient Position: Sitting, Cuff Size: Adult Regular)   Pulse 82   Ht 1.626 m (5' 4.02\")   Wt 57.2 kg (126 lb 3.2 oz)   LMP 07/17/2013   SpO2 95%   BMI 21.65 kg/m    Body mass index is 21.65 kg/m .  Physical Exam   GENERAL: healthy, alert and no distress  EYES: Eyes grossly " normal to inspection, PERRL and conjunctivae and sclerae normal  RESP: normal effort, no wheezing  CV: regular rates and rhythm and no peripheral edema  MS: no gross musculoskeletal defects noted, no edema

## 2023-06-30 NOTE — NURSING NOTE
"Krish Renee is a 62 year old female patient that presents today in clinic for the following:    Chief Complaint   Patient presents with     Diabetes     Diabetes check.     RECHECK     Left eye irritation.     The patient's allergies and medications were reviewed as noted. A set of vitals were recorded as noted without incident: /72 (BP Location: Right arm, Patient Position: Sitting, Cuff Size: Adult Regular)   Pulse 82   Ht 1.626 m (5' 4.02\")   Wt 57.2 kg (126 lb 3.2 oz)   LMP 07/17/2013   SpO2 95%   BMI 21.65 kg/m  . The patient does not have any other questions for the provider.    Ellie Kirkpatrick, EMT at 9:10 AM on 6/30/2023.  Primary care clinic: [phone]  "

## 2023-07-01 LAB — DEPRECATED CALCIDIOL+CALCIFEROL SERPL-MC: 41 UG/L (ref 20–75)

## 2023-07-10 ENCOUNTER — MYC MEDICAL ADVICE (OUTPATIENT)
Dept: OPHTHALMOLOGY | Facility: CLINIC | Age: 63
End: 2023-07-10
Payer: COMMERCIAL

## 2023-07-12 NOTE — TELEPHONE ENCOUNTER
Spoke to pt at 1330    Right eye new floater times 3 days-- string like floater.    No new flashing     Vision stable    Pt in [facility] Friday afternoon    Reviewed s/s of vitreous changes.    Scheduled Friday afternoon when in town and recommended seeing eye provider today/tomorrow if able to for dilated eye exam    Reviewed if having any more floaters/flashing to see eye provider.    Pt verbally demonstrated understanding and will call eye clinics in [place] per pt    Keon Bush RN 1:14 PM 07/12/23

## 2023-07-17 ENCOUNTER — TRANSCRIBE ORDERS (OUTPATIENT)
Dept: OTHER | Age: 63
End: 2023-07-17

## 2023-07-17 DIAGNOSIS — H43.811 VITREOUS DEGENERATION OF RIGHT EYE: Primary | ICD-10-CM

## 2023-07-19 ENCOUNTER — TELEPHONE (OUTPATIENT)
Dept: INTERNAL MEDICINE | Facility: CLINIC | Age: 63
End: 2023-07-19
Payer: COMMERCIAL

## 2023-07-28 DIAGNOSIS — F33.1 MAJOR DEPRESSIVE DISORDER, RECURRENT EPISODE, MODERATE (H): ICD-10-CM

## 2023-07-28 NOTE — TELEPHONE ENCOUNTER
"Requested Prescriptions   Pending Prescriptions Disp Refills    FLUoxetine (PROZAC) 20 MG capsule 180 capsule 3     Sig: Take 3 capsules (60 mg) by mouth daily       SSRIs Protocol Failed - 7/28/2023 11:23 AM        Failed - PHQ-9 score less than 5 in past 6 months     Please review last PHQ-9 score.           Failed - Recent (6 mo) or future (30 days) visit within the authorizing provider's specialty     Patient had office visit in the last 6 months or has a visit in the next 30 days with authorizing provider or within the authorizing provider's specialty.  See \"Patient Info\" tab in inbasket, or \"Choose Columns\" in Meds & Orders section of the refill encounter.            Passed - Medication is active on med list        Passed - Patient is age 18 or older        Passed - No active pregnancy on record        Passed - No positive pregnancy test in last 12 months           Last Written Prescription Date:  2/1/23  Last Fill Quantity: 180,  # refills: 3   Last office visit: 12/3/2021 ; last virtual visit: Visit date not found with prescribing provider:  Dr. Christine   Future Office Visit:      Medication is being filled for 1 time refill only due to:  Patient needs to be seen because it has been more than one year since last visit.  Susan Fam RN on 7/28/2023 at 11:34 AM          "

## 2023-08-10 ENCOUNTER — MYC MEDICAL ADVICE (OUTPATIENT)
Dept: INTERNAL MEDICINE | Facility: CLINIC | Age: 63
End: 2023-08-10
Payer: COMMERCIAL

## 2023-08-11 ENCOUNTER — MYC MEDICAL ADVICE (OUTPATIENT)
Dept: INTERNAL MEDICINE | Facility: CLINIC | Age: 63
End: 2023-08-11
Payer: COMMERCIAL

## 2023-08-11 NOTE — TELEPHONE ENCOUNTER
Form completed and mailed to patient and faxed to HIM.        Enrico Shanks CMA ([facility]) at 6:57 AM on 8/11/2023

## 2023-08-14 NOTE — TELEPHONE ENCOUNTER
Form completed.  Copy mailed to pt and copy faxed to HIM.      Enrico Shanks CMA ([facility]) at 7:11 AM on 8/14/2023

## 2023-10-03 ENCOUNTER — IMMUNIZATION (OUTPATIENT)
Dept: FAMILY MEDICINE | Facility: CLINIC | Age: 63
End: 2023-10-03
Payer: COMMERCIAL

## 2023-10-03 DIAGNOSIS — Z23 ENCOUNTER FOR IMMUNIZATION: Primary | ICD-10-CM

## 2023-10-03 PROCEDURE — 90682 RIV4 VACC RECOMBINANT DNA IM: CPT

## 2023-10-03 PROCEDURE — 99207 PR NO CHARGE NURSE ONLY: CPT

## 2023-10-03 PROCEDURE — 90471 IMMUNIZATION ADMIN: CPT

## 2023-10-03 NOTE — PROGRESS NOTES
Prior to immunization administration, verified patients identity using patient s name and date of birth. Please see Immunization Activity for additional information.     Screening Questionnaire for Adult Immunization    Are you sick today?   No   Do you have allergies to medications, food, a vaccine component or latex?   No   Have you ever had a serious reaction after receiving a vaccination?   No   Do you have a long-term health problem with heart, lung, kidney, or metabolic disease (e.g., diabetes), asthma, a blood disorder, no spleen, complement component deficiency, a cochlear implant, or a spinal fluid leak?  Are you on long-term aspirin therapy?   Yes   Do you have cancer, leukemia, HIV/AIDS, or any other immune system problem?   No   Do you have a parent, brother, or sister with an immune system problem?   No   In the past 3 months, have you taken medications that affect  your immune system, such as prednisone, other steroids, or anticancer drugs; drugs for the treatment of rheumatoid arthritis, Crohn s disease, or psoriasis; or have you had radiation treatments?   No   Have you had a seizure, or a brain or other nervous system problem?   No   During the past year, have you received a transfusion of blood or blood    products, or been given immune (gamma) globulin or antiviral drug?   No   For women: Are you pregnant or is there a chance you could become       pregnant during the next month?   No   Have you received any vaccinations in the past 4 weeks?   No     Immunization questionnaire answers were all negative.    I have reviewed the following standing orders:   This patient is due and qualifies for the Influenza vaccine.    Click here for Influenza Vaccine Standing Order    I have reviewed the vaccines inclusion and exclusion criteria; No concerns regarding eligibility.     Patient instructed to remain in clinic for 15 minutes afterwards, and to report any adverse reactions.     Screening performed by  Tiana Zelaya on 10/3/2023 at 9:17 AM.

## 2023-11-04 ENCOUNTER — HEALTH MAINTENANCE LETTER (OUTPATIENT)
Age: 63
End: 2023-11-04

## 2023-11-10 ENCOUNTER — LAB (OUTPATIENT)
Dept: LAB | Facility: CLINIC | Age: 63
End: 2023-11-10
Payer: COMMERCIAL

## 2023-11-10 DIAGNOSIS — E11.9 TYPE 2 DIABETES MELLITUS WITHOUT COMPLICATION, WITH LONG-TERM CURRENT USE OF INSULIN (H): ICD-10-CM

## 2023-11-10 DIAGNOSIS — Z79.4 TYPE 2 DIABETES MELLITUS WITHOUT COMPLICATION, WITH LONG-TERM CURRENT USE OF INSULIN (H): ICD-10-CM

## 2023-11-10 LAB
ANION GAP SERPL CALCULATED.3IONS-SCNC: 15 MMOL/L (ref 7–15)
BUN SERPL-MCNC: 12.2 MG/DL (ref 8–23)
CALCIUM SERPL-MCNC: 9.8 MG/DL (ref 8.8–10.2)
CHLORIDE SERPL-SCNC: 101 MMOL/L (ref 98–107)
CHOLEST SERPL-MCNC: 123 MG/DL
CREAT SERPL-MCNC: 0.59 MG/DL (ref 0.51–0.95)
CREAT UR-MCNC: 93.5 MG/DL
DEPRECATED HCO3 PLAS-SCNC: 26 MMOL/L (ref 22–29)
EGFRCR SERPLBLD CKD-EPI 2021: >90 ML/MIN/1.73M2
GLUCOSE SERPL-MCNC: 184 MG/DL (ref 70–99)
HBA1C MFR BLD: 6.6 %
HDLC SERPL-MCNC: 45 MG/DL
LDLC SERPL CALC-MCNC: 57 MG/DL
MICROALBUMIN UR-MCNC: <12 MG/L
MICROALBUMIN/CREAT UR: NORMAL MG/G{CREAT}
NONHDLC SERPL-MCNC: 78 MG/DL
POTASSIUM SERPL-SCNC: 4.2 MMOL/L (ref 3.4–5.3)
SODIUM SERPL-SCNC: 142 MMOL/L (ref 135–145)
TRIGL SERPL-MCNC: 106 MG/DL

## 2023-11-10 PROCEDURE — 83036 HEMOGLOBIN GLYCOSYLATED A1C: CPT | Performed by: INTERNAL MEDICINE

## 2023-11-10 PROCEDURE — 99000 SPECIMEN HANDLING OFFICE-LAB: CPT | Performed by: PATHOLOGY

## 2023-11-10 PROCEDURE — 82570 ASSAY OF URINE CREATININE: CPT | Performed by: INTERNAL MEDICINE

## 2023-11-10 PROCEDURE — 80048 BASIC METABOLIC PNL TOTAL CA: CPT | Performed by: PATHOLOGY

## 2023-11-10 PROCEDURE — 80061 LIPID PANEL: CPT | Performed by: PATHOLOGY

## 2023-11-10 PROCEDURE — 36415 COLL VENOUS BLD VENIPUNCTURE: CPT | Performed by: PATHOLOGY

## 2023-11-24 ENCOUNTER — MYC REFILL (OUTPATIENT)
Dept: OBGYN | Facility: CLINIC | Age: 63
End: 2023-11-24
Payer: COMMERCIAL

## 2023-11-24 DIAGNOSIS — E78.00 HYPERCHOLESTEREMIA: ICD-10-CM

## 2023-11-24 DIAGNOSIS — F33.1 MAJOR DEPRESSIVE DISORDER, RECURRENT EPISODE, MODERATE (H): ICD-10-CM

## 2023-11-24 NOTE — TELEPHONE ENCOUNTER
Last OV was December 2021.  Patient notified on 7/28/23 need for annual. 3 month refill sent at this time.  GAD7 and PHQ9 update July.    Routing refill request to provider since patient still has not been seen and has not had an appointment.    Darlene Tovar RN

## 2023-11-28 RX ORDER — ATORVASTATIN CALCIUM 40 MG/1
40 TABLET, FILM COATED ORAL DAILY
Qty: 90 TABLET | Refills: 3 | Status: SHIPPED | OUTPATIENT
Start: 2023-11-28

## 2023-12-13 ENCOUNTER — OFFICE VISIT (OUTPATIENT)
Dept: OPHTHALMOLOGY | Facility: CLINIC | Age: 63
End: 2023-12-13
Payer: COMMERCIAL

## 2023-12-13 DIAGNOSIS — H52.13 MYOPIA OF BOTH EYES: ICD-10-CM

## 2023-12-13 DIAGNOSIS — E11.9 TYPE 2 DIABETES MELLITUS WITHOUT RETINOPATHY (H): Primary | ICD-10-CM

## 2023-12-13 DIAGNOSIS — H43.811 POSTERIOR VITREOUS DETACHMENT, RIGHT EYE: ICD-10-CM

## 2023-12-13 DIAGNOSIS — H25.13 SENILE NUCLEAR SCLEROSIS, BILATERAL: ICD-10-CM

## 2023-12-13 PROCEDURE — 92014 COMPRE OPH EXAM EST PT 1/>: CPT | Performed by: OPTOMETRIST

## 2023-12-13 PROCEDURE — 92015 DETERMINE REFRACTIVE STATE: CPT | Performed by: OPTOMETRIST

## 2023-12-13 ASSESSMENT — REFRACTION_WEARINGRX
OD_CYLINDER: +0.75
OD_SPHERE: -2.50
OS_ADD: +2.50
OS_AXIS: 150
OS_CYLINDER: +0.25
OS_SPHERE: -2.25
OD_ADD: +2.50
OD_AXIS: 015

## 2023-12-13 ASSESSMENT — CONF VISUAL FIELD
METHOD: COUNTING FINGERS
OD_NORMAL: 1
OS_SUPERIOR_NASAL_RESTRICTION: 0
OS_NORMAL: 1
OD_SUPERIOR_TEMPORAL_RESTRICTION: 0
OD_INFERIOR_TEMPORAL_RESTRICTION: 0
OD_SUPERIOR_NASAL_RESTRICTION: 0
OD_INFERIOR_NASAL_RESTRICTION: 0
OS_INFERIOR_TEMPORAL_RESTRICTION: 0
OS_INFERIOR_NASAL_RESTRICTION: 0
OS_SUPERIOR_TEMPORAL_RESTRICTION: 0

## 2023-12-13 ASSESSMENT — SLIT LAMP EXAM - LIDS
COMMENTS: 2+ MEIBOMIAN GLAND DYSFUNCTION
COMMENTS: 2+ MEIBOMIAN GLAND DYSFUNCTION

## 2023-12-13 ASSESSMENT — REFRACTION_MANIFEST
OD_ADD: +2.50
OD_CYLINDER: +0.75
OD_SPHERE: -2.75
OS_SPHERE: -2.50
OS_CYLINDER: +0.25
OS_ADD: +2.50
OD_AXIS: 015
OS_AXIS: 150

## 2023-12-13 ASSESSMENT — CUP TO DISC RATIO
OD_RATIO: 0.2
OS_RATIO: 0.2

## 2023-12-13 ASSESSMENT — VISUAL ACUITY
OS_CC: 20/20
OD_CC: 20/20
OS_CC+: -2
METHOD: SNELLEN - LINEAR
CORRECTION_TYPE: GLASSES
OD_CC+: -2

## 2023-12-13 ASSESSMENT — TONOMETRY
OD_IOP_MMHG: 20
OS_IOP_MMHG: 19
IOP_METHOD: ICARE

## 2023-12-13 ASSESSMENT — EXTERNAL EXAM - RIGHT EYE: OD_EXAM: NORMAL

## 2023-12-13 ASSESSMENT — EXTERNAL EXAM - LEFT EYE: OS_EXAM: NORMAL

## 2023-12-13 NOTE — NURSING NOTE
Chief Complaints and History of Present Illnesses   Patient presents with    Diabetic Eye Exam     Pt here for diabetic eye exam.     Chief Complaint(s) and History of Present Illness(es)       Diabetic Eye Exam              Comments: Pt here for diabetic eye exam.              Comments    Pt has decreased vision and new floaters. Floaters started over summer and was seen by a local doctor. No other concerns.   Lab Results       Component                Value               Date                       A1C                      6.6                 11/10/2023                 A1C                      6.8                 06/30/2023                 A1C                      6.8                 10/14/2022                 A1C                      6.3                 09/24/2021                 A1C                      6.7                 06/21/2021                 A1C                      6.3                 09/15/2020                 A1C                      6.3                 12/07/2018                 A1C                      6.6                 05/15/2018                 A1C                      6.1                 09/25/2017          Jnenifer Govea, COA on 12/13/2023 at 11:23 AM

## 2023-12-13 NOTE — PROGRESS NOTES
Assessment/Plan  (E11.9) Type 2 diabetes mellitus without retinopathy (H)  (primary encounter diagnosis)  Plan: Discussed findings with patient. Encouraged continued blood glucose, pressure, and lipid control. Patient should continue following recommendations of primary care provider. Patient should plan on returning to clinic annually for a dilated eye exam but was encouraged to return to clinic sooner with new flashes/floaters or other vision changes.     (H43.811) Posterior vitreous detachment, right ey  Comment: Since this past summer. This is still bothersome  Plan: Offered vitreolysis referral, but patient defers for now. Return to clinic with new floaters or flashes in vision.    (H25.13) Senile nuclear sclerosis, bilateral  Comment: Minimal  Plan: Monitor.     (H52.13) Myopia of both eyes  Plan: Discussed findings with patient. New spectacle prescription dispensed to patient. Patient is welcome to return to clinic with prolonged adaptation difficulties. Patient may return for CL exam only at some point next spring (she is in two weddings for her children).       Complete documentation of historical and exam elements from today's encounter can  be found in the full encounter summary report (not reduplicated in this progress  note). I personally obtained the chief complaint(s) and history of present illness. I  confirmed and edited as necessary the review of systems, past medical/surgical  history, family history, social history, and examination findings as documented by  others; and I examined the patient myself. I personally reviewed the relevant tests,  images, and reports as documented above. I formulated and edited as necessary the  assessment and plan and discussed the findings and management plan with the  patient and family.    Elia Jaffe OD

## 2023-12-27 ENCOUNTER — ANCILLARY PROCEDURE (OUTPATIENT)
Dept: MAMMOGRAPHY | Facility: CLINIC | Age: 63
End: 2023-12-27
Attending: INTERNAL MEDICINE
Payer: COMMERCIAL

## 2023-12-27 DIAGNOSIS — Z12.31 VISIT FOR SCREENING MAMMOGRAM: ICD-10-CM

## 2023-12-27 PROCEDURE — 77063 BREAST TOMOSYNTHESIS BI: CPT | Mod: GC

## 2023-12-27 PROCEDURE — 77067 SCR MAMMO BI INCL CAD: CPT | Mod: GC

## 2024-01-13 ENCOUNTER — HEALTH MAINTENANCE LETTER (OUTPATIENT)
Age: 64
End: 2024-01-13

## 2024-01-28 ENCOUNTER — MYC REFILL (OUTPATIENT)
Dept: INTERNAL MEDICINE | Facility: CLINIC | Age: 64
End: 2024-01-28
Payer: COMMERCIAL

## 2024-01-28 DIAGNOSIS — E11.9 TYPE 2 DIABETES MELLITUS WITHOUT COMPLICATION, WITH LONG-TERM CURRENT USE OF INSULIN (H): ICD-10-CM

## 2024-01-28 DIAGNOSIS — Z79.4 TYPE 2 DIABETES MELLITUS WITHOUT COMPLICATION, WITH LONG-TERM CURRENT USE OF INSULIN (H): ICD-10-CM

## 2024-01-28 DIAGNOSIS — E11.9 TYPE 2 DIABETES MELLITUS WITHOUT COMPLICATION, UNSPECIFIED WHETHER LONG TERM INSULIN USE (H): ICD-10-CM

## 2024-01-30 RX ORDER — FLURBIPROFEN SODIUM 0.3 MG/ML
SOLUTION/ DROPS OPHTHALMIC
Qty: 90 EACH | Refills: 3 | Status: SHIPPED | OUTPATIENT
Start: 2024-01-30 | End: 2024-01-31

## 2024-01-30 RX ORDER — METFORMIN HCL 500 MG
TABLET, EXTENDED RELEASE 24 HR ORAL
Qty: 360 TABLET | Refills: 1 | Status: SHIPPED | OUTPATIENT
Start: 2024-01-30 | End: 2024-04-29

## 2024-01-30 NOTE — TELEPHONE ENCOUNTER
6/30/2023  [facility] Internal Medicine [place]     Ariane Varela MD  Internal Medicine

## 2024-01-30 NOTE — TELEPHONE ENCOUNTER
6/30/2023  [facility] Internal Medicine Ariane Jackson MD  Internal Medicine     GFR Estimate   Date Value Ref Range Status   11/10/2023 >90 >60 mL/min/1.73m2 Final   09/15/2020 >90 >60 mL/min/[1.73_m2] Final     Comment:     Non  GFR Calc  Starting 12/18/2018, serum creatinine based estimated GFR (eGFR) will be   calculated using the Chronic Kidney Disease Epidemiology Collaboration   (CKD-EPI) equation.

## 2024-01-31 ENCOUNTER — MYC REFILL (OUTPATIENT)
Dept: INTERNAL MEDICINE | Facility: CLINIC | Age: 64
End: 2024-01-31
Payer: COMMERCIAL

## 2024-01-31 DIAGNOSIS — E11.9 TYPE 2 DIABETES MELLITUS WITHOUT COMPLICATION, UNSPECIFIED WHETHER LONG TERM INSULIN USE (H): ICD-10-CM

## 2024-01-31 RX ORDER — FLURBIPROFEN SODIUM 0.3 MG/ML
SOLUTION/ DROPS OPHTHALMIC
Qty: 90 EACH | Refills: 3 | Status: SHIPPED | OUTPATIENT
Start: 2024-01-31 | End: 2024-08-16

## 2024-01-31 NOTE — TELEPHONE ENCOUNTER
insulin pen needle (B-D U/F) 31G X 5 MM miscellaneous   Sent new order to updated pharmacy for Pt care.    Bharti Stallings RN  Central Triage Red Flags/Med Refills

## 2024-03-23 ENCOUNTER — HEALTH MAINTENANCE LETTER (OUTPATIENT)
Age: 64
End: 2024-03-23

## 2024-04-19 ENCOUNTER — LAB (OUTPATIENT)
Dept: LAB | Facility: CLINIC | Age: 64
End: 2024-04-19
Payer: COMMERCIAL

## 2024-04-19 DIAGNOSIS — Z79.4 TYPE 2 DIABETES MELLITUS WITHOUT COMPLICATION, WITH LONG-TERM CURRENT USE OF INSULIN (H): ICD-10-CM

## 2024-04-19 DIAGNOSIS — E11.9 TYPE 2 DIABETES MELLITUS WITHOUT COMPLICATION, WITH LONG-TERM CURRENT USE OF INSULIN (H): ICD-10-CM

## 2024-04-19 LAB — HBA1C MFR BLD: 6.9 %

## 2024-04-19 PROCEDURE — 36415 COLL VENOUS BLD VENIPUNCTURE: CPT | Performed by: PATHOLOGY

## 2024-04-19 PROCEDURE — 99000 SPECIMEN HANDLING OFFICE-LAB: CPT | Performed by: PATHOLOGY

## 2024-04-19 PROCEDURE — 83036 HEMOGLOBIN GLYCOSYLATED A1C: CPT | Performed by: INTERNAL MEDICINE

## 2024-04-22 ENCOUNTER — MYC REFILL (OUTPATIENT)
Dept: INTERNAL MEDICINE | Facility: CLINIC | Age: 64
End: 2024-04-22
Payer: COMMERCIAL

## 2024-04-22 DIAGNOSIS — E11.9 TYPE 2 DIABETES MELLITUS WITHOUT COMPLICATION, UNSPECIFIED WHETHER LONG TERM INSULIN USE (H): ICD-10-CM

## 2024-04-23 NOTE — TELEPHONE ENCOUNTER
insulin glargine 100 UNIT/ML pen   Last Written Prescription Date:  5/15/2023  Last Fill Quantity: 15,   # refills: 4  Last Office Visit : 6/30/2023  Future Office visit:  None    Routing refill request to provider for review/approval because:  Second Request  Needs updated office visit  Please call Pt to schedule    Bharti Stallings RN  Central Triage Red Flags/Med Refills

## 2024-04-24 RX ORDER — INSULIN GLARGINE 100 [IU]/ML
10 INJECTION, SOLUTION SUBCUTANEOUS DAILY
Qty: 15 ML | Refills: 1 | Status: SHIPPED | OUTPATIENT
Start: 2024-04-24 | End: 2024-06-14

## 2024-04-28 ENCOUNTER — MYC MEDICAL ADVICE (OUTPATIENT)
Dept: INTERNAL MEDICINE | Facility: CLINIC | Age: 64
End: 2024-04-28
Payer: COMMERCIAL

## 2024-04-28 DIAGNOSIS — Z79.4 TYPE 2 DIABETES MELLITUS WITHOUT COMPLICATION, WITH LONG-TERM CURRENT USE OF INSULIN (H): Primary | ICD-10-CM

## 2024-04-28 DIAGNOSIS — E11.9 TYPE 2 DIABETES MELLITUS WITHOUT COMPLICATION, WITH LONG-TERM CURRENT USE OF INSULIN (H): Primary | ICD-10-CM

## 2024-04-29 ENCOUNTER — MYC REFILL (OUTPATIENT)
Dept: INTERNAL MEDICINE | Facility: CLINIC | Age: 64
End: 2024-04-29
Payer: COMMERCIAL

## 2024-04-29 DIAGNOSIS — E11.9 TYPE 2 DIABETES MELLITUS WITHOUT COMPLICATION, WITH LONG-TERM CURRENT USE OF INSULIN (H): ICD-10-CM

## 2024-04-29 DIAGNOSIS — F33.1 MAJOR DEPRESSIVE DISORDER, RECURRENT EPISODE, MODERATE (H): ICD-10-CM

## 2024-04-29 DIAGNOSIS — Z79.4 TYPE 2 DIABETES MELLITUS WITHOUT COMPLICATION, WITH LONG-TERM CURRENT USE OF INSULIN (H): ICD-10-CM

## 2024-04-29 RX ORDER — METFORMIN HCL 500 MG
2000 TABLET, EXTENDED RELEASE 24 HR ORAL
Qty: 360 TABLET | Refills: 0 | Status: SHIPPED | OUTPATIENT
Start: 2024-04-29 | End: 2024-09-16

## 2024-04-29 NOTE — TELEPHONE ENCOUNTER
Last Clinic Visit: 6/30/2023 [facility] Internal Medicine [place]    metFORMIN (GLUCOPHAGE XR) 500 MG 24 hr tablet: passed medication protocol  - refills sent  - message sent to patient

## 2024-05-02 RX ORDER — BLOOD-GLUCOSE METER
KIT MISCELLANEOUS
Qty: 300 STRIP | Refills: 4 | Status: SHIPPED | OUTPATIENT
Start: 2024-05-02

## 2024-06-13 ASSESSMENT — ANXIETY QUESTIONNAIRES
7. FEELING AFRAID AS IF SOMETHING AWFUL MIGHT HAPPEN: SEVERAL DAYS
7. FEELING AFRAID AS IF SOMETHING AWFUL MIGHT HAPPEN: SEVERAL DAYS
GAD7 TOTAL SCORE: 4
GAD7 TOTAL SCORE: 4
5. BEING SO RESTLESS THAT IT IS HARD TO SIT STILL: NOT AT ALL
3. WORRYING TOO MUCH ABOUT DIFFERENT THINGS: SEVERAL DAYS
8. IF YOU CHECKED OFF ANY PROBLEMS, HOW DIFFICULT HAVE THESE MADE IT FOR YOU TO DO YOUR WORK, TAKE CARE OF THINGS AT HOME, OR GET ALONG WITH OTHER PEOPLE?: NOT DIFFICULT AT ALL
4. TROUBLE RELAXING: NOT AT ALL
2. NOT BEING ABLE TO STOP OR CONTROL WORRYING: SEVERAL DAYS
IF YOU CHECKED OFF ANY PROBLEMS ON THIS QUESTIONNAIRE, HOW DIFFICULT HAVE THESE PROBLEMS MADE IT FOR YOU TO DO YOUR WORK, TAKE CARE OF THINGS AT HOME, OR GET ALONG WITH OTHER PEOPLE: NOT DIFFICULT AT ALL
GAD7 TOTAL SCORE: 4
6. BECOMING EASILY ANNOYED OR IRRITABLE: SEVERAL DAYS
1. FEELING NERVOUS, ANXIOUS, OR ON EDGE: NOT AT ALL

## 2024-06-13 ASSESSMENT — PATIENT HEALTH QUESTIONNAIRE - PHQ9
SUM OF ALL RESPONSES TO PHQ QUESTIONS 1-9: 2
10. IF YOU CHECKED OFF ANY PROBLEMS, HOW DIFFICULT HAVE THESE PROBLEMS MADE IT FOR YOU TO DO YOUR WORK, TAKE CARE OF THINGS AT HOME, OR GET ALONG WITH OTHER PEOPLE: NOT DIFFICULT AT ALL
SUM OF ALL RESPONSES TO PHQ QUESTIONS 1-9: 2

## 2024-06-14 ENCOUNTER — OFFICE VISIT (OUTPATIENT)
Dept: INTERNAL MEDICINE | Facility: CLINIC | Age: 64
End: 2024-06-14
Payer: COMMERCIAL

## 2024-06-14 VITALS
WEIGHT: 130.2 LBS | HEART RATE: 78 BPM | SYSTOLIC BLOOD PRESSURE: 117 MMHG | OXYGEN SATURATION: 100 % | BODY MASS INDEX: 22.23 KG/M2 | DIASTOLIC BLOOD PRESSURE: 73 MMHG | HEIGHT: 64 IN

## 2024-06-14 DIAGNOSIS — Z00.00 PREVENTATIVE HEALTH CARE: Primary | ICD-10-CM

## 2024-06-14 DIAGNOSIS — F33.1 MAJOR DEPRESSIVE DISORDER, RECURRENT EPISODE, MODERATE (H): ICD-10-CM

## 2024-06-14 DIAGNOSIS — D22.9 BENIGN MOLE: ICD-10-CM

## 2024-06-14 DIAGNOSIS — E11.9 TYPE 2 DIABETES MELLITUS WITHOUT COMPLICATION, UNSPECIFIED WHETHER LONG TERM INSULIN USE (H): ICD-10-CM

## 2024-06-14 PROCEDURE — 90480 ADMN SARSCOV2 VAC 1/ONLY CMP: CPT | Performed by: INTERNAL MEDICINE

## 2024-06-14 PROCEDURE — 99396 PREV VISIT EST AGE 40-64: CPT | Mod: 25 | Performed by: INTERNAL MEDICINE

## 2024-06-14 PROCEDURE — 91320 SARSCV2 VAC 30MCG TRS-SUC IM: CPT | Performed by: INTERNAL MEDICINE

## 2024-06-14 RX ORDER — INSULIN GLARGINE 100 [IU]/ML
14 INJECTION, SOLUTION SUBCUTANEOUS AT BEDTIME
Qty: 15 ML | Refills: 3 | Status: SHIPPED | OUTPATIENT
Start: 2024-06-14

## 2024-06-14 NOTE — PROGRESS NOTES
Assessment & Plan     Preventative health care  Discussed vaccines with patient.  Given the asplenia, recommend RSV and COVID-vaccine.  Patient is up-to-date on the pneumococcal vaccine.  - REVIEW OF HEALTH MAINTENANCE PROTOCOL ORDERS  - COVID-19 12+ (2023-24) (PFIZER)    Type 2 diabetes mellitus without complication, unspecified whether long term insulin use (H)  Will continue with current dose of insulin at this time.  Will check CBC as well as hemoglobin A1c to determine the need for medication adjustments.  Patient was instructed to recheck hemoglobin A1c in about 2 months.  - insulin glargine 100 UNIT/ML pen; Inject 14 Units Subcutaneous at bedtime  - CBC with platelets; Future  - Hemoglobin A1c; Future    Moderate Depression [296.32]  Fluoxetine refill was sent to patient's pharmacy.  - FLUoxetine (PROZAC) 20 MG capsule; Take 3 capsules (60 mg) by mouth daily    Benign mole  Given multiple moles, recommend further evaluation by dermatology.  Referral was placed today.  - Adult Dermatology  Referral; Future        No follow-ups on file.    Michelle Rutherford is a 63 year old, presenting for the following health issues:  Follow Up (Pt here to discuss skin cancer concerns, diabetes, and depression/anxiety.)      6/14/2024     2:53 PM   Additional Questions   Roomed by Della MORAES   Accompanied by N/A     History of Present Illness       Mental Health Follow-up:  Patient presents to follow-up on Depression & Anxiety.Patient's depression since last visit has been:  Better  The patient is not having other symptoms associated with depression.  Patient's anxiety since last visit has been:  Good  The patient is not having other symptoms associated with anxiety.  Any significant life events: No  Patient is not feeling anxious or having panic attacks.  Patient has no concerns about alcohol or drug use.    Diabetes:   She presents for follow up of diabetes.  She is checking home blood glucose a few times a  "month.   She checks blood glucose before and after meals.  Blood glucose is never over 200 and sometimes under 70.  When her blood glucose is low, the patient is asymptomatic for confusion, blurred vision, lethargy and reports not feeling dizzy, shaky, or weak.   She has no concerns regarding her diabetes at this time.   She is not experiencing numbness or burning in feet, excessive thirst, blurry vision, weight changes or redness, sores or blisters on feet.           She eats 4 or more servings of fruits and vegetables daily.She exercises with enough effort to increase her heart rate 30 to 60 minutes per day.  She exercises with enough effort to increase her heart rate 6 days per week.   She is taking medications regularly.     Patient reports that she has been feeling well.  She had several moles that have been bothersome.  She would like further evaluation.  Patient also reports that she is concerned about increase in her hemoglobin A1c values.  She states that she is currently on 14 units of insulin and wondering if the dose needs to be adjusted.      Objective    /73 (BP Location: Right arm, Patient Position: Sitting, Cuff Size: Adult Small)   Pulse 78   Ht 1.626 m (5' 4.02\")   Wt 59.1 kg (130 lb 3.2 oz)   LMP 07/17/2013   SpO2 100%   BMI 22.34 kg/m    Body mass index is 22.34 kg/m .  Physical Exam   GENERAL: alert and no distress  EYES: Eyes grossly normal to inspection, PERRL and conjunctivae and sclerae normal  NECK: no adenopathy, no asymmetry, masses, or scars  RESP: lungs clear to auscultation - no rales, rhonchi or wheezes  CV: regular rate and rhythm, normal S1 S2, no S3 or S4, no murmur, click or rub, no peripheral edema  ABDOMEN: soft, nontender and bowel sounds normal  MS: no gross musculoskeletal defects noted, no edema  SKIN: no suspicious lesions or rashes  NEURO: Normal strength and tone, mentation intact and speech normal  Diabetic foot exam: normal DP and PT pulses, no trophic " changes or ulcerative lesions, normal sensory exam, and normal monofilament exam          Signed Electronically by: Ariane Varela MD

## 2024-07-02 ENCOUNTER — OFFICE VISIT (OUTPATIENT)
Dept: URGENT CARE | Facility: URGENT CARE | Age: 64
End: 2024-07-02
Payer: COMMERCIAL

## 2024-07-02 VITALS
RESPIRATION RATE: 16 BRPM | OXYGEN SATURATION: 98 % | SYSTOLIC BLOOD PRESSURE: 104 MMHG | HEIGHT: 64 IN | WEIGHT: 127 LBS | DIASTOLIC BLOOD PRESSURE: 71 MMHG | BODY MASS INDEX: 21.68 KG/M2 | HEART RATE: 89 BPM

## 2024-07-02 DIAGNOSIS — S20.162A INSECT BITE OF LEFT BREAST, INITIAL ENCOUNTER: Primary | ICD-10-CM
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
## 2024-07-02 PROCEDURE — 99213 OFFICE O/P EST LOW 20 MIN: CPT | Performed by: FAMILY MEDICINE

## 2024-07-02 RX ORDER — CEPHALEXIN 500 MG/1
500 CAPSULE ORAL 3 TIMES DAILY
Qty: 21 CAPSULE | Refills: 0 | Status: SHIPPED | OUTPATIENT
Start: 2024-07-02 | End: 2024-07-09

## 2024-07-02 NOTE — PATIENT INSTRUCTIONS
Apply hydrocortisone 1% ointment twice daily   Zyrtec 1 tab daily   If the redness gets bigger with more warmth and pain then start antibiotic

## 2024-07-02 NOTE — PROGRESS NOTES
Chief Complaint   Patient presents with    Insect Bites     X1 week of big bite, discolored, itching has subsided        Krish was seen today for insect bites.    Diagnoses and all orders for this visit:    Insect bite of left breast, initial encounter  -     cephALEXin (KEFLEX) 500 MG capsule; Take 1 capsule (500 mg) by mouth 3 times daily for 7 days      Differential diagnosis cellulitis/reaction to insect bite/allergic reaction to insect bite  PLAN:   See orders in epic.   Symptomatic treat with antihistamines Apply hydrocortisone 1% ointment twice daily   Zyrtec 1 tab daily   If the redness gets bigger with more warmth and pain then start antibiotic  as needed. Follow-up with primary clinic if not improving.  The area did not look infected at this point there was no sign of cellulitis discussed with patient to continue doing above treatment if symptoms worsen with increasing pain or increasing warmth should start the antibiotic patient was given a postdated prescription for an antibiotic to take it only if symptoms worsen.    SUBJECTIVE:  Krish Renee is a 63 year old female history of diabetes mellitus, with a chief complaint of insect bite to the left breast following which developed a rash in the left breast itching has subsided  Onset of symptoms was 1 week(s) ago.    Course of illness: gradual onset.  Severity mild  Current and Associated symptoms: erythematous rash   Treatment measures tried include topical antibiotic creme.  Predisposing factors include insect bite .    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 300 strip 4    blood glucose monitoring (FREESTYLE) lancets Use to test blood sugars 4 times daily or as directed. 100 each 11    blood glucose monitoring (NO BRAND SPECIFIED) meter device kit Use to test blood sugar 4 times daily or as directed. Please dispense any brand covered by insurance that meets Pt need. Thank you. 1 kit 0    cephALEXin (KEFLEX) 500 MG capsule Take 1 capsule (500 mg) by mouth 3 times daily for 7 days 21 capsule 0    FLUoxetine (PROZAC) 20 MG capsule Take 3 capsules (60 mg) by mouth daily 180 capsule 4    insulin glargine 100 UNIT/ML pen Inject 14 Units Subcutaneous at bedtime 15 mL 3    insulin pen needle (B-D U/F) 31G X 5 MM miscellaneous USE ONCE DAILY OR AS DIRECTED 90 each 3    metFORMIN (GLUCOPHAGE XR) 500 MG 24 hr tablet Take 4 tablets (2,000 mg) by mouth daily with food (meal) 360 tablet 0    econazole nitrate 1 % external cream Apply topically daily To feet and toenails. (Patient not taking: Reported on 4/19/2023) 510 g 1    glucagon (GLUCAGON EMERGENCY) 1 MG kit Inject 1 mg into the muscle once for 1 dose 1 mg 0    meclizine (ANTIVERT) 12.5 MG tablet Take 1 tablet (12.5 mg) by mouth 3 times daily as needed for dizziness (Patient not taking: Reported on 4/19/2023) 10 tablet 0    sulfamethoxazole-trimethoprim (BACTRIM DS) 800-160 MG tablet Take 1 tablet by mouth 2 times daily (Patient not taking: Reported on 6/14/2024) 14 tablet 0     Social History     Tobacco Use    Smoking status: Never    Smokeless tobacco: Never   Substance Use Topics    Alcohol use: No       ROS:  Review of systems negative except as stated above.    OBJECTIVE:   /71   Pulse 89   Resp 16   Ht 1.626 m (5' 4\")   " Wt 57.6 kg (127 lb)   LMP 07/17/2013   SpO2 98%   BMI 21.80 kg/m    GENERAL APPEARANCE: healthy, alert and no distress  Left breast - there is a 4 cm area of pink discoloration with no warmth or tenderness with a 1mm scab noted   PSYCH: mentation appears normal      Cassandra Gonzales MD

## 2024-08-10 ENCOUNTER — HEALTH MAINTENANCE LETTER (OUTPATIENT)
Age: 64
End: 2024-08-10

## 2024-08-16 ENCOUNTER — MYC REFILL (OUTPATIENT)
Dept: INTERNAL MEDICINE | Facility: CLINIC | Age: 64
End: 2024-08-16
Payer: COMMERCIAL

## 2024-08-16 DIAGNOSIS — E11.9 TYPE 2 DIABETES MELLITUS WITHOUT COMPLICATION, UNSPECIFIED WHETHER LONG TERM INSULIN USE (H): ICD-10-CM

## 2024-08-18 RX ORDER — FLURBIPROFEN SODIUM 0.3 MG/ML
SOLUTION/ DROPS OPHTHALMIC
Qty: 90 EACH | Refills: 3 | Status: SHIPPED | OUTPATIENT
Start: 2024-08-18

## 2024-08-18 NOTE — TELEPHONE ENCOUNTER
insulin pen needle (B-D U/F) 31G X 5 MM miscellaneous   Disp-90 each, R-3   Start: 01/31/2024 6/14/2024  [facility] Internal Medicine [place]    Ariane Varela MD  Internal Medicine

## 2024-09-09 ENCOUNTER — LAB (OUTPATIENT)
Dept: LAB | Facility: CLINIC | Age: 64
End: 2024-09-09
Payer: COMMERCIAL

## 2024-09-09 DIAGNOSIS — E11.9 TYPE 2 DIABETES MELLITUS WITHOUT COMPLICATION, UNSPECIFIED WHETHER LONG TERM INSULIN USE (H): ICD-10-CM

## 2024-09-09 LAB
ERYTHROCYTE [DISTWIDTH] IN BLOOD BY AUTOMATED COUNT: 14.4 % (ref 10–15)
HBA1C MFR BLD: 7.3 %
HCT VFR BLD AUTO: 36.3 % (ref 35–47)
HGB BLD-MCNC: 11.7 G/DL (ref 11.7–15.7)
MCH RBC QN AUTO: 30.4 PG (ref 26.5–33)
MCHC RBC AUTO-ENTMCNC: 32.2 G/DL (ref 31.5–36.5)
MCV RBC AUTO: 94 FL (ref 78–100)
PLATELET # BLD AUTO: 479 10E3/UL (ref 150–450)
RBC # BLD AUTO: 3.85 10E6/UL (ref 3.8–5.2)
WBC # BLD AUTO: 8.8 10E3/UL (ref 4–11)

## 2024-09-09 PROCEDURE — 36415 COLL VENOUS BLD VENIPUNCTURE: CPT | Performed by: PATHOLOGY

## 2024-09-09 PROCEDURE — 99000 SPECIMEN HANDLING OFFICE-LAB: CPT | Performed by: PATHOLOGY

## 2024-09-09 PROCEDURE — 83036 HEMOGLOBIN GLYCOSYLATED A1C: CPT | Performed by: INTERNAL MEDICINE

## 2024-09-09 PROCEDURE — 85027 COMPLETE CBC AUTOMATED: CPT | Performed by: PATHOLOGY

## 2024-09-10 ENCOUNTER — OFFICE VISIT (OUTPATIENT)
Dept: DERMATOLOGY | Facility: CLINIC | Age: 64
End: 2024-09-10
Attending: INTERNAL MEDICINE
Payer: COMMERCIAL

## 2024-09-10 DIAGNOSIS — D22.9 MULTIPLE BENIGN NEVI: ICD-10-CM

## 2024-09-10 DIAGNOSIS — L30.4 INTERTRIGO: ICD-10-CM

## 2024-09-10 DIAGNOSIS — L81.4 SOLAR LENTIGO: Primary | ICD-10-CM

## 2024-09-10 DIAGNOSIS — Z79.4 TYPE 2 DIABETES MELLITUS WITHOUT COMPLICATION, WITH LONG-TERM CURRENT USE OF INSULIN (H): ICD-10-CM

## 2024-09-10 DIAGNOSIS — L82.1 SEBORRHEIC KERATOSIS: ICD-10-CM

## 2024-09-10 DIAGNOSIS — D22.9 BENIGN MOLE: ICD-10-CM

## 2024-09-10 DIAGNOSIS — D18.01 CHERRY ANGIOMA: ICD-10-CM

## 2024-09-10 DIAGNOSIS — E11.9 TYPE 2 DIABETES MELLITUS WITHOUT COMPLICATION, WITH LONG-TERM CURRENT USE OF INSULIN (H): ICD-10-CM

## 2024-09-10 PROCEDURE — 99203 OFFICE O/P NEW LOW 30 MIN: CPT | Performed by: DERMATOLOGY

## 2024-09-10 ASSESSMENT — PAIN SCALES - GENERAL: PAINLEVEL: NO PAIN (0)

## 2024-09-10 NOTE — LETTER
9/10/2024       RE: Krish Renee  [address] [facility] [address]     Dear Colleague,    Thank you for referring your patient, Krish Renee, to the [facility]ERMAT[facility] [place] at [facility]. Please see a copy of my visit note below.    [facility] Dermatology Note  Encounter Date: Sep 10, 2024  Office Visit     Dermatology Problem List:  1. Intertrigo  - OTC ZeasorbAF  2. Benign lesions    FBSE: 9/10/24  ____________________________________________    Assessment & Plan:    # Intertrigo, inframammary  - Recommended the following OTC powder: ZeasorbAF    # Benign lesions: Seborrheic keratoses, lentigines (including lesion of concern, R chest/collarbone), cherry angioma, and multiple benign nevi  - Reassurance provided; no treatment is medically necessary  - Recommend sunscreens SPF #30 or greater, protective clothing and avoidance of tanning beds  - ABCD's of melanoma were reviewed with patient and handout provided.     Procedures Performed:   None    Follow-up: 1 year(s) in-person, or earlier for new or changing lesions    Staff and Scribe:   I, MYA SAEZ, am serving as a scribe; to document services personally performed by Levon Wilcox MD -based on data collection and the provider's statements to me.    Staff attestation:  The documentation recorded by the scribe accurately reflects the services I personally performed and the decisions I personally made. I have made edits where needed.    Levon Wilcox MD  Staff Dermatologist and Dermatopathologist  , Department of Dermatology   ____________________________________________    CC: Derm Problem (FBSE- spot of concern on collarbone)    HPI:  Ms. Krish Renee is a(n) 63 year old female who presents today as a new patient for FBSE. She reports a spot of concern on the right upper chest region that has remained the same for years.       No  family history of melanoma. Reports that she has not been seen by dermatology before but did attend a pop-up melanoma check. Mentioned severe sun burns in young age. History of tanning bed use. Patient is otherwise feeling well, without additional skin concerns.    Labs Reviewed:  N/A    Physical Exam:  Vitals: LMP 07/17/2013   SKIN: Full skin, which includes the head/face, both arms, chest, back, abdomen,both legs, genitalia and/or groin buttocks, digits and/or nails, was examined.  - 7 mm evenly colored light brown macule with irregular border on the right upper chest   - There are waxy stuck on tan to brown papules on the trunk and extremities.   - There are dome shaped bright red papules on the trunk.   - Multiple regular brown pigmented macules and papules are identified on the trunk and extremities.   - Scattered brown macules on sun exposed areas.   - No other lesions of concern on areas examined.     Medications:  Current Outpatient Medications   Medication Sig Dispense Refill     aspirin 81 MG EC tablet Take 1 tablet (81 mg) by mouth daily 90 tablet 3     atorvastatin (LIPITOR) 40 MG tablet Take 1 tablet (40 mg) by mouth daily 90 tablet 3     blood glucose (FREESTYLE LITE) test strip Use to test blood sugars 4 times daily or as directed. 300 strip 4     blood glucose monitoring (FREESTYLE) lancets Use to test blood sugars 4 times daily or as directed. 100 each 11     blood glucose monitoring (NO BRAND SPECIFIED) meter device kit Use to test blood sugar 4 times daily or as directed. Please dispense any brand covered by insurance that meets Pt need. Thank you. 1 kit 0     FLUoxetine (PROZAC) 20 MG capsule Take 3 capsules (60 mg) by mouth daily 180 capsule 4     insulin glargine 100 UNIT/ML pen Inject 14 Units Subcutaneous at bedtime 15 mL 3     insulin pen needle (B-D U/F) 31G X 5 MM miscellaneous USE ONCE DAILY OR AS DIRECTED 90 each 3     meclizine (ANTIVERT) 12.5 MG tablet Take 1 tablet (12.5 mg) by mouth  3 times daily as needed for dizziness 10 tablet 0     metFORMIN (GLUCOPHAGE XR) 500 MG 24 hr tablet Take 4 tablets (2,000 mg) by mouth daily with food (meal) 360 tablet 0     sulfamethoxazole-trimethoprim (BACTRIM DS) 800-160 MG tablet Take 1 tablet by mouth 2 times daily 14 tablet 0     econazole nitrate 1 % external cream Apply topically daily To feet and toenails. (Patient not taking: Reported on 4/19/2023) 510 g 1     glucagon (GLUCAGON EMERGENCY) 1 MG kit Inject 1 mg into the muscle once for 1 dose 1 mg 0     No current facility-administered medications for this visit.      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     Hyperlipidemia LDL goal <160     Ectopic breast tissue     Abdominal pain, right lower quadrant     Periocular dermatitis     Diabetes mellitus, type 2 (H)     ELEONORA (obstructive sleep apnea) Mild     S/P splenectomy     Depression     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      Again, thank you for allowing me to participate in the care of your patient.      Sincerely,    Levon Wilcox MD

## 2024-09-10 NOTE — PATIENT INSTRUCTIONS
Zeasorb AF     Learning the ABCDEs or Melanomas / Self Skin checks    Even if you have carefully practiced sun safety all summer, it's important to continue being vigilant about your skin in fall, winter, and beyond. Throughout the year, you should examine your skin head-to-toe once a month, looking for any suspicious lesions. Self-exams can help you identify potential skin cancers early, when they can almost always be completely cured. As a general rule, to spot either melanomas or non-melanoma skin cancers (such as basal cell carcinoma and squamous cell carcinoma), take note of any new moles or growths, and any existing growths that begin to grow or change significantly in any other way.  Lesions that change, itch, bleed, or don't heal are also alarm signals. Physicians have developed two specific strategies for early recognition ofmelanoma, the deadliest form of skin cancer: the ABCDEs and the Ugly Duckling sign.      Moles, brown spots and growths on the skin are usually harmless -- but not always. Anyone who has more than 100 moles is at greater risk for melanoma. The first signs can appear in one or more atypical moles. That's why it's so important to get to know your skin very well and to recognize any changes in the moles on your body. Look for the ABCDE signs of melanoma, and if you see one or more, make an appointment with a physician immediately.    Common, benign moles look the same over time. Be on alert when moles start to evolve or change and see a doctor. Any change -- in size, shape, color, elevation, or another trait, or any new symptom such as bleeding, itching or crusting -- points to danger.    A - Asymmetry  This benign mole is not asymmetrical. If you draw a line through the middle, the two sides will match, meaning it is symmetrical. If you draw a line through this mole, the two halves will not match, meaning it is asymmetrical, a warning sign for melanoma.    B - Border  A benign mole has  smooth, even borders, unlike melanomas. The borders of an early melanoma tend to be uneven. The edges may be scalloped or notched.     C - Color  Most benign moles are all one color -- often a single shade of brown. Having a variety of colors is another warning signal. A number of different shades of brown, tan or black could appear. A melanoma may also become red, white or blue.      D - Diameter  Benign moles usually have a smaller diameter than malignant ones. Melanomas usually are larger in diameter than the eraser on your pencil tip (  inch or 6mm), but they may sometimes be smaller when first detected.      E - Evolution  Common, benign moles look the same over time. Be on the alert when a mole starts to evolve or change in any way. When a mole is evolving, see a doctor. Any change -- in size, shape, color, elevation, or another trait, or any new symptom such as bleeding, itching or crusting -- points to danger.    The Ugly Duckling Rule  This is a simplified method where you look for any moles that do not match the other moles you have. Even if some of your moles look a little funny we are less concerned if they match one another. We are looking for the outlier - the one that is darker, larger, etc. In A, there is one dominant mole pattern with slight variation in size. The outlier lesion is clearly darker and larger than all other moles. In B, the patient has two predominant mole patterns, one with larger nevi and one with small, darker nevi. The outlier lesion is small but lacks pigmentation. In C, the patient shows only one lesion on the back. If this lesion is changing, symptomatic, or deemed atypical, it should be removed.      Seborrheic keratoses - a mimicker of melanoma    Seborrheic keratosis (seb-o-REE-ik care-uh-TOE-sis) is a common skin growth. It may seem worrisome because it can look like a wart, pre-cancerous skin growth (actinic keratosis), or skin cancer. Despite their appearance, seborrheic  "keratoses are harmless.    Most people get these growths when they are middle aged or older. Because they begin at a later age and can have a flat, waxy or wart-like appearance, seborrheic keratoses are often called the  barnacles of aging  or \"wisdom spots\".    It s possible to have just one of these growths, but most people develop several. Seborrheic keratoses range in color from white to black; however, most are tan or brown. You can find these harmless growths anywhere on the skin, except the palms and soles. Most often, you ll see them on the chest, back, head, or neck. Seborrheic keratoses are not contagious.               "

## 2024-09-10 NOTE — NURSING NOTE
Dermatology Rooming Note    Krish Renee's goals for this visit include:   Chief Complaint   Patient presents with    Derm Problem     FBSE- spot of concern on JOSE Avendaño

## 2024-09-10 NOTE — PROGRESS NOTES
[facility] Dermatology Note  Encounter Date: Sep 10, 2024  Office Visit     Dermatology Problem List:  1. Intertrigo  - OTC ZeasorbAF  2. Benign lesions    FBSE: 9/10/24  ____________________________________________    Assessment & Plan:    # Intertrigo, inframammary  - Recommended the following OTC powder: ZeasorbAF    # Benign lesions: Seborrheic keratoses, lentigines (including lesion of concern, R chest/collarbone), cherry angioma, and multiple benign nevi  - Reassurance provided; no treatment is medically necessary  - Recommend sunscreens SPF #30 or greater, protective clothing and avoidance of tanning beds  - ABCD's of melanoma were reviewed with patient and handout provided.     Procedures Performed:   None    Follow-up: 1 year(s) in-person, or earlier for new or changing lesions    Staff and Scribe:   I, MYA SAEZ, am serving as a scribe; to document services personally performed by Levon Wilcox MD -based on data collection and the provider's statements to me.    Staff attestation:  The documentation recorded by the scribe accurately reflects the services I personally performed and the decisions I personally made. I have made edits where needed.    Levon Wilcox MD  Staff Dermatologist and Dermatopathologist  , Department of Dermatology   ____________________________________________    CC: Derm Problem (FBSE- spot of concern on collarbone)    HPI:  Ms. Krish Renee is a(n) 63 year old female who presents today as a new patient for FBSE. She reports a spot of concern on the right upper chest region that has remained the same for years.       No family history of melanoma. Reports that she has not been seen by dermatology before but did attend a pop-up melanoma check. Mentioned severe sun burns in young age. History of tanning bed use. Patient is otherwise feeling well, without additional skin concerns.    Labs Reviewed:  N/A    Physical Exam:  Vitals: LMP  07/17/2013   SKIN: Full skin, which includes the head/face, both arms, chest, back, abdomen,both legs, genitalia and/or groin buttocks, digits and/or nails, was examined.  - 7 mm evenly colored light brown macule with irregular border on the right upper chest   - There are waxy stuck on tan to brown papules on the trunk and extremities.   - There are dome shaped bright red papules on the trunk.   - Multiple regular brown pigmented macules and papules are identified on the trunk and extremities.   - Scattered brown macules on sun exposed areas.   - No other lesions of concern on areas examined.     Medications:  Current Outpatient Medications   Medication Sig Dispense Refill    aspirin 81 MG EC tablet Take 1 tablet (81 mg) by mouth daily 90 tablet 3    atorvastatin (LIPITOR) 40 MG tablet Take 1 tablet (40 mg) by mouth daily 90 tablet 3    blood glucose (FREESTYLE LITE) test strip Use to test blood sugars 4 times daily or as directed. 300 strip 4    blood glucose monitoring (FREESTYLE) lancets Use to test blood sugars 4 times daily or as directed. 100 each 11    blood glucose monitoring (NO BRAND SPECIFIED) meter device kit Use to test blood sugar 4 times daily or as directed. Please dispense any brand covered by insurance that meets Pt need. Thank you. 1 kit 0    FLUoxetine (PROZAC) 20 MG capsule Take 3 capsules (60 mg) by mouth daily 180 capsule 4    insulin glargine 100 UNIT/ML pen Inject 14 Units Subcutaneous at bedtime 15 mL 3    insulin pen needle (B-D U/F) 31G X 5 MM miscellaneous USE ONCE DAILY OR AS DIRECTED 90 each 3    meclizine (ANTIVERT) 12.5 MG tablet Take 1 tablet (12.5 mg) by mouth 3 times daily as needed for dizziness 10 tablet 0    metFORMIN (GLUCOPHAGE XR) 500 MG 24 hr tablet Take 4 tablets (2,000 mg) by mouth daily with food (meal) 360 tablet 0    sulfamethoxazole-trimethoprim (BACTRIM DS) 800-160 MG tablet Take 1 tablet by mouth 2 times daily 14 tablet 0    econazole nitrate 1 % external cream  Apply topically daily To feet and toenails. (Patient not taking: Reported on 4/19/2023) 510 g 1    glucagon (GLUCAGON EMERGENCY) 1 MG kit Inject 1 mg into the muscle once for 1 dose 1 mg 0     No current facility-administered medications for this visit.      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    Hyperlipidemia LDL goal <160    Ectopic breast tissue    Abdominal pain, right lower quadrant    Periocular dermatitis    Diabetes mellitus, type 2 (H)    ELEONORA (obstructive sleep apnea) Mild    S/P splenectomy    Depression     Past Medical History:   Diagnosis Date    Benign neoplasm 11/2006    pancreas     depression     Leiomyoma of uterus, unspecified     NONSPECIFIC MEDICAL HISTORY Aug 2006    neurologic evaluation for CVA-like symptoms, no etiology determined    ELEONORA (obstructive sleep apnea) Mild 6/20/2018    HSAT 6/13/2018 AHI 6.5    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

## 2024-09-16 RX ORDER — METFORMIN HCL 500 MG
TABLET, EXTENDED RELEASE 24 HR ORAL
Qty: 360 TABLET | Refills: 0 | Status: SHIPPED | OUTPATIENT
Start: 2024-09-16

## 2024-09-16 NOTE — TELEPHONE ENCOUNTER
metFORMIN (GLUCOPHAGE XR) 500 MG 24 hr tablet       Last Written Prescription Date:  4/29/24  Last Fill Quantity: 360,   # refills: 0  Last Office Visit : 6/14/24  Future Office visit:  12/13/24  A1C 9/1/24  7.3,    GFR 11/2023    Refilled per protocol    Etelvina VERDIN RN  P Central Nursing/Red Flag Triage & Med Refill Team

## 2024-11-15 ENCOUNTER — TELEPHONE (OUTPATIENT)
Dept: DERMATOLOGY | Facility: CLINIC | Age: 64
End: 2024-11-15
Payer: COMMERCIAL

## 2024-11-15 NOTE — TELEPHONE ENCOUNTER
Left Voicemail (1st Attempt) and Sent Mychart (1st Attempt) for the patient to call back and schedule the following:    Appointment type: Return Dermatology   Provider: Levon Wilcox MD  Return date: around 9/10/2025  Specialty phone number: [phone]  Additional appointment(s) needed: n/a  Additonal Notes: WQ appt request         Viktoria Cuenca on 11/15/2024 at 2:23 PM

## 2024-12-06 ENCOUNTER — LAB (OUTPATIENT)
Dept: LAB | Facility: CLINIC | Age: 64
End: 2024-12-06
Payer: COMMERCIAL

## 2024-12-06 DIAGNOSIS — E11.9 DIABETES MELLITUS, TYPE 2 (H): ICD-10-CM

## 2024-12-06 DIAGNOSIS — Z90.81 S/P SPLENECTOMY: ICD-10-CM

## 2024-12-06 LAB
ANION GAP SERPL CALCULATED.3IONS-SCNC: 11 MMOL/L (ref 7–15)
BUN SERPL-MCNC: 11.8 MG/DL (ref 8–23)
CALCIUM SERPL-MCNC: 9.6 MG/DL (ref 8.8–10.4)
CHLORIDE SERPL-SCNC: 102 MMOL/L (ref 98–107)
CHOLEST SERPL-MCNC: 108 MG/DL
CREAT SERPL-MCNC: 0.58 MG/DL (ref 0.51–0.95)
CREAT UR-MCNC: 134 MG/DL
EGFRCR SERPLBLD CKD-EPI 2021: >90 ML/MIN/1.73M2
ERYTHROCYTE [DISTWIDTH] IN BLOOD BY AUTOMATED COUNT: 17.3 % (ref 10–15)
EST. AVERAGE GLUCOSE BLD GHB EST-MCNC: 148 MG/DL
FASTING STATUS PATIENT QL REPORTED: YES
FASTING STATUS PATIENT QL REPORTED: YES
GLUCOSE SERPL-MCNC: 102 MG/DL (ref 70–99)
HBA1C MFR BLD: 6.8 %
HCO3 SERPL-SCNC: 26 MMOL/L (ref 22–29)
HCT VFR BLD AUTO: 35.2 % (ref 35–47)
HDLC SERPL-MCNC: 40 MG/DL
HGB BLD-MCNC: 11.1 G/DL (ref 11.7–15.7)
LDLC SERPL CALC-MCNC: 49 MG/DL
MCH RBC QN AUTO: 28.7 PG (ref 26.5–33)
MCHC RBC AUTO-ENTMCNC: 31.5 G/DL (ref 31.5–36.5)
MCV RBC AUTO: 91 FL (ref 78–100)
MICROALBUMIN UR-MCNC: <12 MG/L
MICROALBUMIN/CREAT UR: NORMAL MG/G{CREAT}
NONHDLC SERPL-MCNC: 68 MG/DL
PLATELET # BLD AUTO: 172 10E3/UL (ref 150–450)
POTASSIUM SERPL-SCNC: 4.5 MMOL/L (ref 3.4–5.3)
RBC # BLD AUTO: 3.87 10E6/UL (ref 3.8–5.2)
SODIUM SERPL-SCNC: 139 MMOL/L (ref 135–145)
TRIGL SERPL-MCNC: 93 MG/DL
WBC # BLD AUTO: 8.9 10E3/UL (ref 4–11)

## 2024-12-06 PROCEDURE — 85027 COMPLETE CBC AUTOMATED: CPT | Performed by: PATHOLOGY

## 2024-12-06 PROCEDURE — 80061 LIPID PANEL: CPT | Performed by: PATHOLOGY

## 2024-12-06 PROCEDURE — 83036 HEMOGLOBIN GLYCOSYLATED A1C: CPT | Performed by: INTERNAL MEDICINE

## 2024-12-06 PROCEDURE — 82570 ASSAY OF URINE CREATININE: CPT | Performed by: INTERNAL MEDICINE

## 2024-12-06 PROCEDURE — 82043 UR ALBUMIN QUANTITATIVE: CPT | Performed by: INTERNAL MEDICINE

## 2024-12-06 PROCEDURE — 99000 SPECIMEN HANDLING OFFICE-LAB: CPT | Performed by: PATHOLOGY

## 2024-12-06 PROCEDURE — 36415 COLL VENOUS BLD VENIPUNCTURE: CPT | Performed by: PATHOLOGY

## 2024-12-06 PROCEDURE — 80048 BASIC METABOLIC PNL TOTAL CA: CPT | Performed by: PATHOLOGY

## 2024-12-11 ENCOUNTER — ALLIED HEALTH/NURSE VISIT (OUTPATIENT)
Dept: INTERNAL MEDICINE | Facility: CLINIC | Age: 64
End: 2024-12-11
Payer: COMMERCIAL

## 2024-12-11 DIAGNOSIS — H61.23 BILATERAL IMPACTED CERUMEN: Primary | ICD-10-CM

## 2024-12-11 NOTE — PROGRESS NOTES
Krish Renee received an ear wash at the request of Dr. Varela. The patient's ears were assessed for cerumen. After this, an ear wash was performed in which a small amount of impacted cerumen was removed from left ear, and a significant amount of impacted cerumen was removed from the right ear. After the ear wash, both tympanic membrane were visible.     [place], [place]   Primary Care [facility]

## 2024-12-30 ENCOUNTER — ANCILLARY PROCEDURE (OUTPATIENT)
Dept: MAMMOGRAPHY | Facility: CLINIC | Age: 64
End: 2024-12-30
Attending: INTERNAL MEDICINE
Payer: COMMERCIAL

## 2024-12-30 DIAGNOSIS — Z12.31 VISIT FOR SCREENING MAMMOGRAM: ICD-10-CM

## 2024-12-30 PROCEDURE — 77063 BREAST TOMOSYNTHESIS BI: CPT | Mod: GC | Performed by: RADIOLOGY

## 2024-12-30 PROCEDURE — 77067 SCR MAMMO BI INCL CAD: CPT | Mod: GC | Performed by: RADIOLOGY

## 2025-01-08 ENCOUNTER — OFFICE VISIT (OUTPATIENT)
Dept: OPHTHALMOLOGY | Facility: CLINIC | Age: 65
End: 2025-01-08
Payer: COMMERCIAL

## 2025-01-08 DIAGNOSIS — H52.13 MYOPIA OF BOTH EYES: ICD-10-CM

## 2025-01-08 DIAGNOSIS — E11.9 TYPE 2 DIABETES MELLITUS WITHOUT RETINOPATHY (H): Primary | ICD-10-CM

## 2025-01-08 DIAGNOSIS — H25.13 SENILE NUCLEAR SCLEROSIS, BILATERAL: ICD-10-CM

## 2025-01-08 DIAGNOSIS — H43.811 POSTERIOR VITREOUS DETACHMENT, RIGHT EYE: ICD-10-CM

## 2025-01-08 PROCEDURE — 92014 COMPRE OPH EXAM EST PT 1/>: CPT | Performed by: OPTOMETRIST

## 2025-01-08 PROCEDURE — 92015 DETERMINE REFRACTIVE STATE: CPT | Performed by: OPTOMETRIST

## 2025-01-08 ASSESSMENT — VISUAL ACUITY
OS_CC+: -1
METHOD: SNELLEN - LINEAR
OS_CC: 20/25
CORRECTION_TYPE: GLASSES
OD_CC: 20/25
OD_CC+: -1

## 2025-01-08 ASSESSMENT — CONF VISUAL FIELD
OD_SUPERIOR_NASAL_RESTRICTION: 0
OS_NORMAL: 1
OS_INFERIOR_TEMPORAL_RESTRICTION: 0
OS_SUPERIOR_TEMPORAL_RESTRICTION: 0
OD_INFERIOR_TEMPORAL_RESTRICTION: 0
METHOD: COUNTING FINGERS
OD_NORMAL: 1
OS_SUPERIOR_NASAL_RESTRICTION: 0
OS_INFERIOR_NASAL_RESTRICTION: 0
OD_INFERIOR_NASAL_RESTRICTION: 0
OD_SUPERIOR_TEMPORAL_RESTRICTION: 0

## 2025-01-08 ASSESSMENT — REFRACTION_MANIFEST
OS_CYLINDER: +0.50
OD_SPHERE: -2.00
OD_CYLINDER: +0.75
OD_ADD: +2.50
OS_SPHERE: -2.25
OD_AXIS: 015
OS_AXIS: 150
OS_ADD: +2.50

## 2025-01-08 ASSESSMENT — REFRACTION_WEARINGRX
OS_ADD: +2.50
OD_ADD: +2.50
SPECS_TYPE: PAL
OD_CYLINDER: +0.75
OS_CYLINDER: +0.25
OS_SPHERE: -2.25
OS_AXIS: 150
OD_SPHERE: -2.50
OD_AXIS: 015

## 2025-01-08 ASSESSMENT — CUP TO DISC RATIO
OD_RATIO: 0.2
OS_RATIO: 0.2

## 2025-01-08 ASSESSMENT — EXTERNAL EXAM - LEFT EYE: OS_EXAM: NORMAL

## 2025-01-08 ASSESSMENT — TONOMETRY
IOP_METHOD: ICARE
OS_IOP_MMHG: 17
OD_IOP_MMHG: 17

## 2025-01-08 ASSESSMENT — SLIT LAMP EXAM - LIDS
COMMENTS: NORMAL
COMMENTS: NORMAL

## 2025-01-08 ASSESSMENT — EXTERNAL EXAM - RIGHT EYE: OD_EXAM: NORMAL

## 2025-01-08 NOTE — PROGRESS NOTES
Assessment/Plan  (E11.9) Type 2 diabetes mellitus without retinopathy (H)  (primary encounter diagnosis)  Plan: Discussed findings with patient. Encouraged continued blood glucose, pressure, and lipid control. Patient should continue following recommendations of primary care provider. Patient should plan on returning to clinic annually for a dilated eye exam but was encouraged to return to clinic sooner with new flashes/floaters or other vision changes.     (H43.811) Posterior vitreous detachment, right eye  Plan: Monitor. Return to clinic with new flashes, floaters, or curtain over vision.     (H25.13) Senile nuclear sclerosis, bilateral  Comment: Mild  Plan: Monitor only.     (H52.13) Myopia of both eyes  Plan: REFRACTION [[id number]]        Discussed findings with patient. New spectacle prescription dispensed to patient. Patient is welcome to return to clinic with prolonged adaptation difficulties.     Complete documentation of historical and exam elements from today's encounter can  be found in the full encounter summary report (not reduplicated in this progress  note). I personally obtained the chief complaint(s) and history of present illness. I  confirmed and edited as necessary the review of systems, past medical/surgical  history, family history, social history, and examination findings as documented by  others; and I examined the patient myself. I personally reviewed the relevant tests,  images, and reports as documented above. I formulated and edited as necessary the  assessment and plan and discussed the findings and management plan with the  patient and family.    Elia Jaffe OD

## 2025-01-08 NOTE — NURSING NOTE
"Chief Complaints and History of Present Illnesses   Patient presents with    Diabetic Eye Exam     Chief Complaint(s) and History of Present Illness(es)       Diabetic Eye Exam              Associated symptoms: floaters (comes and goes, \"i am still getting used to them, unsure if there are more/new ones present\" per pt).  Negative for blurred vision and flashes    Diabetes Type: Type 2, controlled with diet, on insulin and taking oral medications    Blood Sugars: is controlled    Treatments tried: no treatments    Pain scale: 0/10              Comments    Pt notes she needs her yearly diabetic eye check, pt notes that she needs new gls lenses.   Last BGL this mornin per pt, she notes her sugars are stable  Lab Results       Component                Value               Date                       A1C                      6.8                 2024                 A1C                      7.3                 2024                 A1C                      6.9                 2024                 A1C                      6.6                 11/10/2023                 A1C                      6.8                 2023                 A1C                      6.7                 2021                 A1C                      6.3                 09/15/2020                 A1C                      6.3                 2018                 A1C                      6.6                 05/15/2018                 A1C                      6.1                 2017              Evie Tineo COT 2025 1:00 PM                       "

## 2025-02-05 DIAGNOSIS — E78.00 HYPERCHOLESTEREMIA: ICD-10-CM

## 2025-02-06 RX ORDER — ATORVASTATIN CALCIUM 40 MG/1
40 TABLET, FILM COATED ORAL DAILY
Qty: 90 TABLET | Refills: 3 | Status: SHIPPED | OUTPATIENT
Start: 2025-02-06

## 2025-02-06 NOTE — TELEPHONE ENCOUNTER
LVD: 12/6/2024  [facility] Medicine Ariane Jackson MD  Internal Medicine    Medication requested and last written as :atorvastatin (LIPITOR) 40 MG [id number]/2023  LDL Cholesterol Calculated   Date Value Ref Range Status   12/06/2024 49 <100 mg/dL Final   09/15/2020 65 <100 mg/dL Final     Comment:     Desirable:       <100 mg/dl

## 2025-02-11 ENCOUNTER — MYC REFILL (OUTPATIENT)
Dept: INTERNAL MEDICINE | Facility: CLINIC | Age: 65
End: 2025-02-11
Payer: COMMERCIAL

## 2025-02-11 DIAGNOSIS — Z79.4 TYPE 2 DIABETES MELLITUS WITHOUT COMPLICATION, WITH LONG-TERM CURRENT USE OF INSULIN (H): ICD-10-CM

## 2025-02-11 DIAGNOSIS — E11.9 TYPE 2 DIABETES MELLITUS WITHOUT COMPLICATION, WITH LONG-TERM CURRENT USE OF INSULIN (H): ICD-10-CM

## 2025-02-12 RX ORDER — METFORMIN HYDROCHLORIDE 500 MG/1
TABLET, EXTENDED RELEASE ORAL
Qty: 360 TABLET | Refills: 1 | Status: SHIPPED | OUTPATIENT
Start: 2025-02-12

## 2025-02-12 NOTE — TELEPHONE ENCOUNTER
Last Written Prescription:  metFORMIN (GLUCOPHAGE XR) 500 MG 24 hr tablet 360 tablet 0 9/16/2024   Sig: TAKE FOUR TABLETS (2,000 MG) BY MOUTH DAILY WITH FOOD (MEAL)   ----------------------  Last Visit Date: 12/6/24  Future Visit Date: none  ----------------------      [x]  Refill decision: Medication refilled per  Medication Refill in Ambulatory Care  policy.    Nita Mayer RN  [facility]/Red Flag Triage & Med Refill Team         Request from pharmacy:  Requested Prescriptions   Pending Prescriptions Disp Refills    metFORMIN (GLUCOPHAGE XR) 500 MG 24 hr tablet 360 tablet 0       Biguanide Agents Failed - 2/12/2025  4:12 PM        Failed - Medication is active on med list and the sig matches. RN to manually verify dose and sig if red X/fail.     If the protocol passes (green check), you do not need to verify med dose and sig.    A prescription matches if they are the same clinical intention.    For Example: once daily and every morning are the same.    For all fails (red x), verify dose and sig.    If the refill does match what is on file, the RN can still proceed to approve the refill request.     If they do not match, route to the appropriate provider.             Passed - Patient is age 10 or older        Passed - Patient has documented A1c within the specified period of time.     If HgbA1C is 8 or greater, it needs to be on file within the past 3 months.  If less than 8, must be on file within the past 6 months.     Recent Labs   Lab Test 12/06/24  0850 09/15/20  1045 05/29/19  0000   A1C 6.8*   < >  --    HEMOGLOBINA1  --   --  5.9    < > = values in this interval not displayed.             Passed - Patient does NOT have a diagnosis of CHF.        Passed - Medication indicated for associated diagnosis     Medication is associated with one or more of the following diagnoses:     Gestational diabetes mellitus     Hyperinsulinar obesity     Hypersecretion of ovarian androgens    Non-alcoholic fatty  liver    Polycystic ovarian syndrome               Pre-diabetes (DM 2 prevention)    Type 2 diabetes mellitus     Weight gain, antipsychotic therapy-induced    Impaired fasting glucose          Passed - Has GFR on file in past 12 months and most recent value is normal        Passed - Recent (6 mo) or future (90 days) visit within the authorizing provider's specialty     The patient must have completed an in-person or virtual visit within the past 6 months or has a future visit scheduled within the next 90 days with the authorizing provider s specialty.  Urgent care and e-visits do not quality as an office visit for this protocol.          Passed - Patient is not pregnant        Passed - Patient has not had a positive pregnancy test within the past 12 mos.

## 2025-03-09 ENCOUNTER — OFFICE VISIT (OUTPATIENT)
Dept: URGENT CARE | Facility: URGENT CARE | Age: 65
End: 2025-03-09
Payer: COMMERCIAL

## 2025-03-09 VITALS
DIASTOLIC BLOOD PRESSURE: 66 MMHG | OXYGEN SATURATION: 99 % | SYSTOLIC BLOOD PRESSURE: 110 MMHG | BODY MASS INDEX: 20.89 KG/M2 | HEART RATE: 75 BPM | WEIGHT: 121.7 LBS

## 2025-03-09 DIAGNOSIS — R30.0 DYSURIA: Primary | ICD-10-CM

## 2025-03-09 DIAGNOSIS — R39.9 UTI SYMPTOMS: ICD-10-CM

## 2025-03-09 LAB
ALBUMIN UR-MCNC: NEGATIVE MG/DL
APPEARANCE UR: CLEAR
BACTERIA #/AREA URNS HPF: ABNORMAL /HPF
BILIRUB UR QL STRIP: NEGATIVE
COLOR UR AUTO: YELLOW
GLUCOSE UR STRIP-MCNC: NEGATIVE MG/DL
HGB UR QL STRIP: ABNORMAL
KETONES UR STRIP-MCNC: NEGATIVE MG/DL
LEUKOCYTE ESTERASE UR QL STRIP: ABNORMAL
NITRATE UR QL: NEGATIVE
PH UR STRIP: 7 [PH] (ref 5–7)
RBC #/AREA URNS AUTO: ABNORMAL /HPF
SP GR UR STRIP: 1.01 (ref 1–1.03)
SQUAMOUS #/AREA URNS AUTO: ABNORMAL /LPF
UROBILINOGEN UR STRIP-ACNC: 0.2 E.U./DL
WBC #/AREA URNS AUTO: ABNORMAL /HPF

## 2025-03-09 PROCEDURE — 87186 SC STD MICRODIL/AGAR DIL: CPT | Performed by: FAMILY MEDICINE

## 2025-03-09 PROCEDURE — 3078F DIAST BP <80 MM HG: CPT | Performed by: FAMILY MEDICINE

## 2025-03-09 PROCEDURE — 99213 OFFICE O/P EST LOW 20 MIN: CPT | Performed by: FAMILY MEDICINE

## 2025-03-09 PROCEDURE — 87086 URINE CULTURE/COLONY COUNT: CPT | Performed by: FAMILY MEDICINE

## 2025-03-09 PROCEDURE — 81001 URINALYSIS AUTO W/SCOPE: CPT | Performed by: FAMILY MEDICINE

## 2025-03-09 PROCEDURE — 3074F SYST BP LT 130 MM HG: CPT | Performed by: FAMILY MEDICINE

## 2025-03-09 RX ORDER — NITROFURANTOIN 25; 75 MG/1; MG/1
100 CAPSULE ORAL 2 TIMES DAILY
Qty: 14 CAPSULE | Refills: 0 | Status: SHIPPED | OUTPATIENT
Start: 2025-03-09 | End: 2025-03-16

## 2025-03-09 NOTE — PROGRESS NOTES
Assessment & Plan     UTI symptoms  Differentials discussed in detail and suspect symptoms secondary to acute cystitis.  UA findings reviewed and urine culture ordered.  Macrobid prescribed and recommended to continue well hydration.  Follow-up if symptoms persist or worsen.  All questions answered.  - nitroFURantoin macrocrystal-monohydrate (MACROBID) 100 MG capsule; Take 1 capsule (100 mg) by mouth 2 times daily for 7 days.    Dysuria  - UA Macroscopic with reflex to Microscopic and Culture - Clinic Collect  - Urine Microscopic Exam  - Urine Culture        Subjective   Krish is a 64 year old, presenting for the following health issues:  Urgent Care (Bladder infection/Sx> yesterday morning, painful, burning, blood in urine during the night time. Has not have these over a decade. )    HPI      Concern -   Onset: yesterday   Description: Dysuria, hematuria, urinary frequency and urgency   Intensity: moderate  Progression of Symptoms:  same  Accompanying Signs & Symptoms: no fever, flank pain or other relevant systemic symptoms   Previous history of similar problem: >20 years ago  Therapies tried and outcome: tylenol         Review of Systems  Constitutional, HEENT, cardiovascular, pulmonary, gi and gu systems are negative, except as otherwise noted.      Objective    /66 (BP Location: Right arm, Patient Position: Sitting, Cuff Size: Adult Regular)   Pulse 75   Wt 55.2 kg (121 lb 11.2 oz)   LMP 07/17/2013   SpO2 99%   BMI 20.89 kg/m    Body mass index is 20.89 kg/m .  Physical Exam   GENERAL: alert and no distress  EYES: Eyes grossly normal to inspection, PERRL and conjunctivae and sclerae normal  NECK: no adenopathy, no asymmetry, masses, or scars  RESP: lungs clear to auscultation - no rales, rhonchi or wheezes  CV: regular rate and rhythm, normal S1 S2, no S3 or S4, no murmur, click or rub, no peripheral edema  ABDOMEN: soft, nontender  MS: no gross musculoskeletal defects noted, no edema  NEURO: Normal  strength and tone, mentation intact and speech normal  PSYCH: mentation appears normal, affect normal/bright    Results for orders placed or performed in visit on 03/09/25   UA Macroscopic with reflex to Microscopic and Culture - Clinic Collect     Status: Abnormal    Specimen: Urine, Midstream   Result Value Ref Range    Color Urine Yellow Colorless, Straw, Light Yellow, Yellow    Appearance Urine Clear Clear    Glucose Urine Negative Negative mg/dL    Bilirubin Urine Negative Negative    Ketones Urine Negative Negative mg/dL    Specific Gravity Urine 1.010 1.003 - 1.035    Blood Urine Moderate (A) Negative    pH Urine 7.0 5.0 - 7.0    Protein Albumin Urine Negative Negative mg/dL    Urobilinogen Urine 0.2 0.2, 1.0 E.U./dL    Nitrite Urine Negative Negative    Leukocyte Esterase Urine Moderate (A) Negative   Urine Microscopic Exam     Status: Abnormal   Result Value Ref Range    Bacteria Urine Few (A) None Seen /HPF    RBC Urine 5-10 (A) 0-2 /HPF /HPF    WBC Urine 25-50 (A) 0-5 /HPF /HPF    Squamous Epithelials Urine Few (A) None Seen /LPF         Signed Electronically by: Antonio An MD

## 2025-03-10 LAB — BACTERIA UR CULT: ABNORMAL

## 2025-03-22 ENCOUNTER — HEALTH MAINTENANCE LETTER (OUTPATIENT)
Age: 65
End: 2025-03-22

## 2025-03-26 ENCOUNTER — MYC REFILL (OUTPATIENT)
Dept: INTERNAL MEDICINE | Facility: CLINIC | Age: 65
End: 2025-03-26
Payer: COMMERCIAL

## 2025-03-26 ENCOUNTER — MYC MEDICAL ADVICE (OUTPATIENT)
Dept: INTERNAL MEDICINE | Facility: CLINIC | Age: 65
End: 2025-03-26
Payer: COMMERCIAL

## 2025-03-26 DIAGNOSIS — F33.1 MAJOR DEPRESSIVE DISORDER, RECURRENT EPISODE, MODERATE (H): ICD-10-CM

## 2025-03-31 NOTE — TELEPHONE ENCOUNTER
Last Visit: 12/6/2024  [facility]  Future Visit: none    FLUoxetine (PROZAC) 20 MG capsule: passed protocol  - refills sent  - message sent to patient    Request from pharmacy:  Requested Prescriptions   Pending Prescriptions Disp Refills    FLUoxetine (PROZAC) 20 MG capsule 180 capsule 4     Sig: Take 3 capsules (60 mg) by mouth daily.       SSRIs Protocol Passed - 3/31/2025  9:16 AM        Passed - PHQ-9 score less than 5 in past 6 months     Please review last PHQ-9 score.       8/6/2023    11:32 AM 6/13/2024     9:29 PM 12/6/2024     8:15 AM   PHQ-9 SCORE   PHQ-9 Total Score MyChart 0 2 (Minimal depression)    PHQ-9 Total Score 0 2 3             Passed - Medication is active on med list and the sig matches. RN to manually verify dose and sig if red X/fail.     If the protocol passes (green check), you do not need to verify med dose and sig.    A prescription matches if they are the same clinical intention.    For Example: once daily and every morning are the same.    The protocol can not identify upper and lower case letters as matching and will fail.     For Example: Take 1 tablet (50 mg) by mouth daily     TAKE 1 TABLET (50 MG) BY MOUTH DAILY    For all fails (red x), verify dose and sig.    If the refill does match what is on file, the RN can still proceed to approve the refill request.       If they do not match, route to the appropriate provider.             Passed - Recent (12 mo) or future (90 days) visit within the authorizing provider's specialty     The patient must have completed an in-person or virtual visit within the past 12 months or has a future visit scheduled within the next 90 days with the authorizing provider s specialty.  Urgent care and e-visits do not qualify as an office visit for this protocol.          Passed - Medication indicated for associated diagnosis     Medication is associated with one or more of the following diagnoses:              Anxiety              Bipolar  Depression  Obsessive-compulsive disorder             Panic disorder  Postmenopausal flushing             Premenstrual dysphoric disorder             Social phobia   Adjustment disorder with depressed mood   Mood disorder   Adjustment disorder with anxious mood          Passed - Patient is age 18 or older        Passed - No active pregnancy on record        Passed - No positive pregnancy test in last 12 months

## 2025-05-19 NOTE — TELEPHONE ENCOUNTER
Assessment/Plan   Diagnoses and all orders for this visit:  Eye pain, bilateral  Dry eye  [facility] gave rx with prism, will get those made  Poss muscle tension headache  Reassured eye pressure normal  Discs normal  recommend use of artificial tears 4 times a day, may use gel or ointment at night if symptoms are present in the morning.    Patient requesting refill of atorvastatin. Able to fill per protocol.

## 2025-07-01 ENCOUNTER — MYC REFILL (OUTPATIENT)
Dept: INTERNAL MEDICINE | Facility: CLINIC | Age: 65
End: 2025-07-01
Payer: COMMERCIAL

## 2025-07-01 DIAGNOSIS — E11.9 TYPE 2 DIABETES MELLITUS WITHOUT COMPLICATION, UNSPECIFIED WHETHER LONG TERM INSULIN USE (H): ICD-10-CM

## 2025-07-05 ENCOUNTER — LAB (OUTPATIENT)
Dept: LAB | Facility: CLINIC | Age: 65
End: 2025-07-05
Payer: COMMERCIAL

## 2025-07-05 ENCOUNTER — HEALTH MAINTENANCE LETTER (OUTPATIENT)
Age: 65
End: 2025-07-05

## 2025-07-05 DIAGNOSIS — E11.9 DIABETES MELLITUS, TYPE 2 (H): ICD-10-CM

## 2025-07-05 LAB
EST. AVERAGE GLUCOSE BLD GHB EST-MCNC: 137 MG/DL
HBA1C MFR BLD: 6.4 %

## 2025-07-05 PROCEDURE — 83036 HEMOGLOBIN GLYCOSYLATED A1C: CPT | Performed by: INTERNAL MEDICINE

## 2025-07-05 PROCEDURE — 36415 COLL VENOUS BLD VENIPUNCTURE: CPT | Performed by: PATHOLOGY

## 2025-07-05 PROCEDURE — 99000 SPECIMEN HANDLING OFFICE-LAB: CPT | Performed by: PATHOLOGY

## 2025-07-06 RX ORDER — FLURBIPROFEN SODIUM 0.3 MG/ML
SOLUTION/ DROPS OPHTHALMIC
Qty: 90 EACH | Refills: 1 | Status: SHIPPED | OUTPATIENT
Start: 2025-07-06

## 2025-07-06 RX ORDER — INSULIN GLARGINE 100 [IU]/ML
14 INJECTION, SOLUTION SUBCUTANEOUS AT BEDTIME
Qty: 15 ML | Refills: 1 | Status: SHIPPED | OUTPATIENT
Start: 2025-07-06

## 2025-07-07 NOTE — TELEPHONE ENCOUNTER
Last Written Prescription:  6/14/24  15 ML : 5  ----------------------  Last Visit Date: 12/6/24  Future Visit Date: 0  ----------------------    Refill decision:   [x] Medication refilled per  Medication Refill in Ambulatory Care  policy.    Request from pharmacy:  Requested Prescriptions   Pending Prescriptions Disp Refills    insulin glargine 100 UNIT/ML pen 15 mL 3     Sig: Inject 14 Units subcutaneously at bedtime.       Insulin Protocol Failed - 7/6/2025 11:30 PM        Failed - Recent (6 month) or future (90 days) visit with the authorizing provider's specialty (provided they have been seen in the past 9 months)     The patient must have completed an in-person or virtual visit within the past 6 months or has a future visit scheduled within the next 90 days with the authorizing provider s specialty.  Urgent care and e-visits do not quality as an office visit for this protocol.          Failed - Chart review required     Review Chart.    Do not approve if insulin is used in a pump.  Instead, direct refill request to the patient's endocrinologist.  If the patient doesn't have an endocrinologist, then send the refill to the patient's PCP for review            Passed - HgbA1C in past 3 or 6 months     If HgbA1C is 8 or greater, it needs to be on file within the past 3 months.  If less than 8, must be on file within the past 6 months.     Recent Labs   Lab Test 07/05/25  0808 09/15/20  1045 05/29/19  0000   A1C 6.4*   < >  --    HEMOGLOBINA1  --   --  5.9    < > = values in this interval not displayed.             Passed - Medication is active on med list and the sig matches. RN to manually verify dose and sig if red X/fail.     If the protocol passes (green check), you do not need to verify med dose and sig.    A prescription matches if they are the same clinical intention.    For Example: once daily and every morning are the same.    The protocol can not identify upper and lower case letters as matching and will  fail.     For Example: Take 1 tablet (50 mg) by mouth daily     TAKE 1 TABLET (50 MG) BY MOUTH DAILY    For all fails (red x), verify dose and sig.    If the refill does match what is on file, the RN can still proceed to approve the refill request.       If they do not match, route to the appropriate provider.             Passed - Medication indicated for associated diagnosis     Medication is associated with one or more of the following diagnoses:   - Type 1 diabetes mellitus  - Type 2 diabetes mellitus  - Diabetic nephropathy; Prophylaxis  - Neuropathy due to diabetes mellitus; Prophylaxis  - Retinopathy due to diabetes mellitus; Prophylaxis  - Diabetes mellitus associated with cystic fibrosis  - Disorder of cardiovascular system; Prophylaxis - Type 1 diabetes mellitus   - Disorder of cardiovascular system; Prophylaxis - Type 2 diabetes mellitus            Passed - Patient is 18 years of age or older          insulin pen needle (B-D U/F) 31G X 5 MM miscellaneous 90 each 3     Sig: USE ONCE DAILY OR AS DIRECTED       Diabetic Supplies Protocol Passed - 7/6/2025 11:30 PM        Passed - Medication is active on med list and the sig matches. RN to manually verify dose and sig if red X/fail.     If the protocol passes (green check), you do not need to verify med dose and sig.    A prescription matches if they are the same clinical intention.    For Example: once daily and every morning are the same.    The protocol can not identify upper and lower case letters as matching and will fail.     For Example: Take 1 tablet (50 mg) by mouth daily     TAKE 1 TABLET (50 MG) BY MOUTH DAILY    For all fails (red x), verify dose and sig.    If the refill does match what is on file, the RN can still proceed to approve the refill request.       If they do not match, route to the appropriate provider.             Passed - Recent (12 month) or future (90 days) visit with authorizing provider's specialty (provided they have been seen  in the past 15 months)     The patient must have completed an in-person or virtual visit within the past 12 months or has a future visit scheduled within the next 90 days with the authorizing provider s specialty.  Urgent care and e-visits do not qualify as an office visit for this protocol.          Passed - Medication indicated for associated diagnosis        Passed - Patient is 18 years of age or older

## 2025-07-26 ENCOUNTER — HEALTH MAINTENANCE LETTER (OUTPATIENT)
Age: 65
End: 2025-07-26

## 2025-08-04 ENCOUNTER — LAB (OUTPATIENT)
Dept: LAB | Facility: CLINIC | Age: 65
End: 2025-08-04
Payer: COMMERCIAL

## 2025-08-04 ENCOUNTER — RESULTS FOLLOW-UP (OUTPATIENT)
Dept: INTERNAL MEDICINE | Facility: CLINIC | Age: 65
End: 2025-08-04

## 2025-08-04 ENCOUNTER — OFFICE VISIT (OUTPATIENT)
Dept: INTERNAL MEDICINE | Facility: CLINIC | Age: 65
End: 2025-08-04
Payer: COMMERCIAL

## 2025-08-04 VITALS
TEMPERATURE: 97.6 F | SYSTOLIC BLOOD PRESSURE: 118 MMHG | WEIGHT: 122.4 LBS | DIASTOLIC BLOOD PRESSURE: 75 MMHG | HEIGHT: 64 IN | BODY MASS INDEX: 20.9 KG/M2 | OXYGEN SATURATION: 96 % | HEART RATE: 87 BPM | RESPIRATION RATE: 16 BRPM

## 2025-08-04 DIAGNOSIS — D64.9 ANEMIA, UNSPECIFIED TYPE: Primary | ICD-10-CM

## 2025-08-04 DIAGNOSIS — D75.838 THROMBOCYTOSIS AFTER SPLENECTOMY: ICD-10-CM

## 2025-08-04 DIAGNOSIS — Z79.4 TYPE 2 DIABETES MELLITUS WITHOUT COMPLICATION, WITH LONG-TERM CURRENT USE OF INSULIN (H): ICD-10-CM

## 2025-08-04 DIAGNOSIS — Z90.81 THROMBOCYTOSIS AFTER SPLENECTOMY: ICD-10-CM

## 2025-08-04 DIAGNOSIS — D64.9 ANEMIA, UNSPECIFIED TYPE: ICD-10-CM

## 2025-08-04 DIAGNOSIS — E11.9 TYPE 2 DIABETES MELLITUS WITHOUT COMPLICATION, WITH LONG-TERM CURRENT USE OF INSULIN (H): ICD-10-CM

## 2025-08-04 DIAGNOSIS — Z00.00 PREVENTATIVE HEALTH CARE: Primary | ICD-10-CM

## 2025-08-04 DIAGNOSIS — Z00.00 PREVENTATIVE HEALTH CARE: ICD-10-CM

## 2025-08-04 LAB
ALBUMIN SERPL BCG-MCNC: 4.2 G/DL (ref 3.5–5.2)
ALP SERPL-CCNC: 71 U/L (ref 40–150)
ALT SERPL W P-5'-P-CCNC: 19 U/L (ref 0–50)
ANION GAP SERPL CALCULATED.3IONS-SCNC: 16 MMOL/L (ref 7–15)
AST SERPL W P-5'-P-CCNC: 28 U/L (ref 0–45)
BILIRUB SERPL-MCNC: 0.3 MG/DL
BUN SERPL-MCNC: 11.2 MG/DL (ref 8–23)
CALCIUM SERPL-MCNC: 10 MG/DL (ref 8.8–10.4)
CHLORIDE SERPL-SCNC: 102 MMOL/L (ref 98–107)
CREAT SERPL-MCNC: 0.59 MG/DL (ref 0.51–0.95)
EGFRCR SERPLBLD CKD-EPI 2021: >90 ML/MIN/1.73M2
ERYTHROCYTE [DISTWIDTH] IN BLOOD BY AUTOMATED COUNT: 16 % (ref 10–15)
FERRITIN SERPL-MCNC: 15 NG/ML (ref 11–328)
GLUCOSE SERPL-MCNC: 100 MG/DL (ref 70–99)
HAPTOGLOB SERPL-MCNC: 152 MG/DL (ref 30–200)
HCO3 SERPL-SCNC: 22 MMOL/L (ref 22–29)
HCT VFR BLD AUTO: 32.9 % (ref 35–47)
HGB BLD-MCNC: 10.3 G/DL (ref 11.7–15.7)
IRON BINDING CAPACITY (ROCHE): 436 UG/DL (ref 240–430)
IRON SATN MFR SERPL: 8 % (ref 15–46)
IRON SERPL-MCNC: 33 UG/DL (ref 37–145)
LDH SERPL L TO P-CCNC: 257 U/L (ref 0–250)
MCH RBC QN AUTO: 27.6 PG (ref 26.5–33)
MCHC RBC AUTO-ENTMCNC: 31.3 G/DL (ref 31.5–36.5)
MCV RBC AUTO: 88 FL (ref 78–100)
PLATELET # BLD AUTO: 570 10E3/UL (ref 150–450)
POTASSIUM SERPL-SCNC: 4.4 MMOL/L (ref 3.4–5.3)
PROT SERPL-MCNC: 7.3 G/DL (ref 6.4–8.3)
RBC # BLD AUTO: 3.73 10E6/UL (ref 3.8–5.2)
SODIUM SERPL-SCNC: 140 MMOL/L (ref 135–145)
VIT B12 SERPL-MCNC: 210 PG/ML (ref 232–1245)
WBC # BLD AUTO: 7 10E3/UL (ref 4–11)

## 2025-08-04 PROCEDURE — 3074F SYST BP LT 130 MM HG: CPT | Performed by: INTERNAL MEDICINE

## 2025-08-04 PROCEDURE — 83550 IRON BINDING TEST: CPT | Performed by: PATHOLOGY

## 2025-08-04 PROCEDURE — 99000 SPECIMEN HANDLING OFFICE-LAB: CPT | Performed by: PATHOLOGY

## 2025-08-04 PROCEDURE — 3078F DIAST BP <80 MM HG: CPT | Performed by: INTERNAL MEDICINE

## 2025-08-04 PROCEDURE — 85027 COMPLETE CBC AUTOMATED: CPT | Performed by: PATHOLOGY

## 2025-08-04 PROCEDURE — 80053 COMPREHEN METABOLIC PANEL: CPT | Performed by: PATHOLOGY

## 2025-08-04 PROCEDURE — 83010 ASSAY OF HAPTOGLOBIN QUANT: CPT | Performed by: INTERNAL MEDICINE

## 2025-08-04 PROCEDURE — 83615 LACTATE (LD) (LDH) ENZYME: CPT | Performed by: PATHOLOGY

## 2025-08-04 PROCEDURE — 99396 PREV VISIT EST AGE 40-64: CPT | Performed by: INTERNAL MEDICINE

## 2025-08-04 PROCEDURE — 83540 ASSAY OF IRON: CPT | Performed by: PATHOLOGY

## 2025-08-04 PROCEDURE — 82728 ASSAY OF FERRITIN: CPT | Performed by: PATHOLOGY

## 2025-08-04 PROCEDURE — 82607 VITAMIN B-12: CPT | Performed by: INTERNAL MEDICINE

## 2025-08-04 PROCEDURE — 36415 COLL VENOUS BLD VENIPUNCTURE: CPT | Performed by: PATHOLOGY

## 2025-08-05 ENCOUNTER — PATIENT OUTREACH (OUTPATIENT)
Dept: ONCOLOGY | Facility: CLINIC | Age: 65
End: 2025-08-05
Payer: COMMERCIAL

## 2025-08-06 RX ORDER — FERROUS SULFATE 325(65) MG
325 TABLET ORAL
Qty: 30 TABLET | Refills: 3 | Status: SHIPPED | OUTPATIENT
Start: 2025-08-06

## 2025-08-06 RX ORDER — LANOLIN ALCOHOL/MO/W.PET/CERES
1000 CREAM (GRAM) TOPICAL DAILY
Qty: 90 TABLET | Refills: 3 | Status: SHIPPED | OUTPATIENT
Start: 2025-08-06

## 2025-08-11 ENCOUNTER — ALLIED HEALTH/NURSE VISIT (OUTPATIENT)
Dept: INTERNAL MEDICINE | Facility: CLINIC | Age: 65
End: 2025-08-11
Payer: COMMERCIAL

## 2025-08-11 DIAGNOSIS — E53.8 VITAMIN B12 DEFICIENCY (NON ANEMIC): Primary | ICD-10-CM

## 2025-08-11 DIAGNOSIS — D51.9 ANEMIA DUE TO VITAMIN B12 DEFICIENCY, UNSPECIFIED B12 DEFICIENCY TYPE: Primary | ICD-10-CM

## 2025-08-11 DIAGNOSIS — E11.9 TYPE 2 DIABETES MELLITUS WITHOUT COMPLICATION, WITH LONG-TERM CURRENT USE OF INSULIN (H): ICD-10-CM

## 2025-08-11 DIAGNOSIS — Z79.4 TYPE 2 DIABETES MELLITUS WITHOUT COMPLICATION, WITH LONG-TERM CURRENT USE OF INSULIN (H): ICD-10-CM

## 2025-08-11 PROCEDURE — 96372 THER/PROPH/DIAG INJ SC/IM: CPT | Performed by: INTERNAL MEDICINE

## 2025-08-11 PROCEDURE — 99207 PR NO CHARGE NURSE ONLY: CPT

## 2025-08-11 RX ORDER — CYANOCOBALAMIN 1000 UG/ML
1000 INJECTION, SOLUTION INTRAMUSCULAR; SUBCUTANEOUS WEEKLY
Status: ACTIVE | OUTPATIENT
Start: 2025-08-11 | End: 2025-09-08

## 2025-08-11 RX ADMIN — CYANOCOBALAMIN 1000 MCG: 1000 INJECTION, SOLUTION INTRAMUSCULAR; SUBCUTANEOUS at 10:13

## 2025-08-13 RX ORDER — METFORMIN HYDROCHLORIDE 500 MG/1
TABLET, EXTENDED RELEASE ORAL
Qty: 360 TABLET | Refills: 1 | Status: SHIPPED | OUTPATIENT
Start: 2025-08-13

## 2025-08-18 ENCOUNTER — ALLIED HEALTH/NURSE VISIT (OUTPATIENT)
Dept: INTERNAL MEDICINE | Facility: CLINIC | Age: 65
End: 2025-08-18
Payer: COMMERCIAL

## 2025-08-18 DIAGNOSIS — E53.8 VITAMIN B12 DEFICIENCY (NON ANEMIC): Primary | ICD-10-CM

## 2025-08-18 PROCEDURE — 99207 PR NO CHARGE NURSE ONLY: CPT

## 2025-08-18 PROCEDURE — 96372 THER/PROPH/DIAG INJ SC/IM: CPT | Performed by: INTERNAL MEDICINE

## 2025-08-18 RX ADMIN — CYANOCOBALAMIN 1000 MCG: 1000 INJECTION, SOLUTION INTRAMUSCULAR; SUBCUTANEOUS at 10:37

## 2025-08-25 ENCOUNTER — ALLIED HEALTH/NURSE VISIT (OUTPATIENT)
Dept: INTERNAL MEDICINE | Facility: CLINIC | Age: 65
End: 2025-08-25
Payer: COMMERCIAL

## 2025-08-25 DIAGNOSIS — Z76.89 ENCOUNTER FOR MEDICATION ADMINISTRATION: Primary | ICD-10-CM

## 2025-08-25 PROCEDURE — 96372 THER/PROPH/DIAG INJ SC/IM: CPT | Performed by: INTERNAL MEDICINE

## 2025-08-25 PROCEDURE — 99207 PR NO CHARGE NURSE ONLY: CPT

## 2025-08-25 RX ADMIN — CYANOCOBALAMIN 1000 MCG: 1000 INJECTION, SOLUTION INTRAMUSCULAR; SUBCUTANEOUS at 10:28

## 2025-09-03 ENCOUNTER — ALLIED HEALTH/NURSE VISIT (OUTPATIENT)
Dept: INTERNAL MEDICINE | Facility: CLINIC | Age: 65
End: 2025-09-03
Payer: COMMERCIAL

## 2025-09-03 DIAGNOSIS — E53.8 VITAMIN B12 DEFICIENCY (NON ANEMIC): Primary | ICD-10-CM

## 2025-09-03 PROCEDURE — 96372 THER/PROPH/DIAG INJ SC/IM: CPT | Performed by: INTERNAL MEDICINE

## 2025-09-03 PROCEDURE — 99207 PR NO CHARGE NURSE ONLY: CPT

## 2025-09-03 RX ADMIN — CYANOCOBALAMIN 1000 MCG: 1000 INJECTION, SOLUTION INTRAMUSCULAR; SUBCUTANEOUS at 10:30

## 2025-09-16 ENCOUNTER — PRE VISIT (OUTPATIENT)
Dept: TRANSPLANT | Facility: CLINIC | Age: 65
End: 2025-09-16
Payer: COMMERCIAL

## (undated) DEVICE — SYR ORISE GEL 10ML M00519201

## (undated) DEVICE — KIT ENDO FIRST STEP DISINFECTANT 200ML W/POUCH EP-4

## (undated) DEVICE — ENDO SNARE POLYPECTOMY OVAL 10MM LOOP SD-240U-10

## (undated) DEVICE — ENDO FORCEP BX CAPTURA LG SPIKE 2.4MMX230CM G53641

## (undated) DEVICE — SUCTION MANIFOLD NEPTUNE 2 SYS 1 PORT 702-025-000

## (undated) DEVICE — ENDO SNARE POLYPECTOMY OVAL 15MM LOOP SD-240U-15

## (undated) DEVICE — SPECIMEN CONTAINER 3OZ W/FORMALIN 59901

## (undated) DEVICE — DEVICE RETRIEVAL ROTH NET PLATINUM UNIV 2.5MMX230CM 00715050

## (undated) DEVICE — PAD CHUX UNDERPAD 30X30"

## (undated) DEVICE — DRSG GAUZE 4X4" TRAY 6939

## (undated) DEVICE — SYR 30ML SLIP TIP W/O NDL 302833

## (undated) DEVICE — CLIP HEMOCLIP ENDOSCOPIC INSTINCT 2.8X230CM INSC-7-230-SS

## (undated) DEVICE — CLIP ENDO RESOLUTION 360 2.8,,X235CM M00521230

## (undated) DEVICE — KIT ENDO TURNOVER/PROCEDURE CARRY-ON 101822

## (undated) DEVICE — GOWN IMPERVIOUS 2XL BLUE

## (undated) DEVICE — NDL SCLEROTHERAPY 25GA CARR-LOCK  00711811

## (undated) DEVICE — ESU GROUND PAD ADULT W/CORD E7507

## (undated) DEVICE — ENDO MARKER SPOT 5ML

## (undated) DEVICE — SOL WATER IRRIG 500ML BOTTLE 2F7113

## (undated) DEVICE — TUBING SUCTION 12"X1/4" N612

## (undated) DEVICE — ENDO FORCEP BX CAPTURA PRO SPIKE G50696

## (undated) DEVICE — ATTACHMENT CAP DISTAL REVEAL 15.0MM BX00711774

## (undated) DEVICE — ENDO TRAP POLYP E-TRAP 00711099

## (undated) DEVICE — WIPE PREMOIST CLEANSING WASHCLOTHS 7988

## (undated) DEVICE — ENDO SNARE POLYPECTOMY SPIRAL 20MM LOOP SD-230U-20

## (undated) DEVICE — ENDO SNARE EXACTO COLD 9MM LOOP 2.4MMX230CM 00711115

## (undated) DEVICE — ENDO SNARE POLYPECTOMY OVAL 25MM LOOP SD-240U-25

## (undated) DEVICE — FCP BIOPSY RADIAL JAW 4 JUMBO 3.2MM CHANNEL M00513370

## (undated) DEVICE — SNARE CAPIVATOR ROUND COLD SNR BX10 M00561101

## (undated) RX ORDER — CYANOCOBALAMIN 1000 UG/ML
INJECTION, SOLUTION INTRAMUSCULAR; SUBCUTANEOUS
Status: DISPENSED
Start: 2025-08-18

## (undated) RX ORDER — FENTANYL CITRATE 50 UG/ML
INJECTION, SOLUTION INTRAMUSCULAR; INTRAVENOUS
Status: DISPENSED
Start: 2023-02-01

## (undated) RX ORDER — CYANOCOBALAMIN 1000 UG/ML
INJECTION, SOLUTION INTRAMUSCULAR; SUBCUTANEOUS
Status: DISPENSED
Start: 2025-09-03

## (undated) RX ORDER — CYANOCOBALAMIN 1000 UG/ML
INJECTION, SOLUTION INTRAMUSCULAR; SUBCUTANEOUS
Status: DISPENSED
Start: 2025-08-25

## (undated) RX ORDER — CYANOCOBALAMIN 1000 UG/ML
INJECTION, SOLUTION INTRAMUSCULAR; SUBCUTANEOUS
Status: DISPENSED
Start: 2025-08-11

## (undated) RX ORDER — DIPHENHYDRAMINE HYDROCHLORIDE 50 MG/ML
INJECTION INTRAMUSCULAR; INTRAVENOUS
Status: DISPENSED
Start: 2023-02-01

## (undated) RX ORDER — ONDANSETRON 2 MG/ML
INJECTION INTRAMUSCULAR; INTRAVENOUS
Status: DISPENSED
Start: 2023-02-01